# Patient Record
Sex: MALE | Race: OTHER | HISPANIC OR LATINO | Employment: FULL TIME | URBAN - METROPOLITAN AREA
[De-identification: names, ages, dates, MRNs, and addresses within clinical notes are randomized per-mention and may not be internally consistent; named-entity substitution may affect disease eponyms.]

---

## 2021-04-08 ENCOUNTER — HOSPITAL ENCOUNTER (INPATIENT)
Facility: HOSPITAL | Age: 36
LOS: 4 days | Discharge: HOME | DRG: 177 | End: 2021-04-12
Attending: EMERGENCY MEDICINE | Admitting: FAMILY MEDICINE
Payer: COMMERCIAL

## 2021-04-08 ENCOUNTER — APPOINTMENT (EMERGENCY)
Dept: RADIOLOGY | Facility: HOSPITAL | Age: 36
DRG: 177 | End: 2021-04-08
Payer: COMMERCIAL

## 2021-04-08 DIAGNOSIS — E87.1 HYPONATREMIA: ICD-10-CM

## 2021-04-08 DIAGNOSIS — E87.6 HYPOKALEMIA: ICD-10-CM

## 2021-04-08 DIAGNOSIS — J12.82 PNEUMONIA DUE TO COVID-19 VIRUS: Primary | ICD-10-CM

## 2021-04-08 DIAGNOSIS — R73.9 HYPERGLYCEMIA: ICD-10-CM

## 2021-04-08 DIAGNOSIS — U07.1 PNEUMONIA DUE TO COVID-19 VIRUS: Primary | ICD-10-CM

## 2021-04-08 DIAGNOSIS — R05.9 COUGH: ICD-10-CM

## 2021-04-08 DIAGNOSIS — R09.02 HYPOXEMIA: ICD-10-CM

## 2021-04-08 PROBLEM — R06.02 SOB (SHORTNESS OF BREATH): Status: ACTIVE | Noted: 2021-04-08

## 2021-04-08 PROBLEM — E87.8 ELECTROLYTE ABNORMALITY: Status: ACTIVE | Noted: 2021-04-08

## 2021-04-08 LAB
ALBUMIN SERPL BCP-MCNC: 2.9 G/DL (ref 3.5–5)
ALP SERPL-CCNC: 43 U/L (ref 46–116)
ALT SERPL W P-5'-P-CCNC: 43 U/L (ref 12–78)
ANION GAP SERPL CALCULATED.3IONS-SCNC: 11 MMOL/L (ref 4–13)
AST SERPL W P-5'-P-CCNC: 42 U/L (ref 5–45)
ATRIAL RATE: 123 BPM
BASOPHILS # BLD AUTO: 0.01 THOUSANDS/ΜL (ref 0–0.1)
BASOPHILS NFR BLD AUTO: 0 % (ref 0–1)
BILIRUB SERPL-MCNC: 0.45 MG/DL (ref 0.2–1)
BUN SERPL-MCNC: 8 MG/DL (ref 5–25)
CALCIUM ALBUM COR SERPL-MCNC: 9.1 MG/DL (ref 8.3–10.1)
CALCIUM SERPL-MCNC: 8.2 MG/DL (ref 8.3–10.1)
CHLORIDE SERPL-SCNC: 97 MMOL/L (ref 100–108)
CK SERPL-CCNC: 118 U/L (ref 39–308)
CO2 SERPL-SCNC: 21 MMOL/L (ref 21–32)
CREAT SERPL-MCNC: 0.99 MG/DL (ref 0.6–1.3)
CRP SERPL QL: 111.4 MG/L
D DIMER PPP FEU-MCNC: 0.84 UG/ML FEU
EOSINOPHIL # BLD AUTO: 0 THOUSAND/ΜL (ref 0–0.61)
EOSINOPHIL NFR BLD AUTO: 0 % (ref 0–6)
ERYTHROCYTE [DISTWIDTH] IN BLOOD BY AUTOMATED COUNT: 11.8 % (ref 11.6–15.1)
FLUAV RNA RESP QL NAA+PROBE: NEGATIVE
FLUBV RNA RESP QL NAA+PROBE: NEGATIVE
GFR SERPL CREATININE-BSD FRML MDRD: 98 ML/MIN/1.73SQ M
GLUCOSE SERPL-MCNC: 294 MG/DL (ref 65–140)
HCT VFR BLD AUTO: 41.8 % (ref 36.5–49.3)
HGB BLD-MCNC: 14.5 G/DL (ref 12–17)
HIV 1+2 AB+HIV1 P24 AG SERPL QL IA: NORMAL
HIV1 P24 AG SER QL: NORMAL
IMM GRANULOCYTES # BLD AUTO: 0.08 THOUSAND/UL (ref 0–0.2)
IMM GRANULOCYTES NFR BLD AUTO: 1 % (ref 0–2)
LACTATE SERPL-SCNC: 1.8 MMOL/L (ref 0.5–2)
LYMPHOCYTES # BLD AUTO: 1.3 THOUSANDS/ΜL (ref 0.6–4.47)
LYMPHOCYTES NFR BLD AUTO: 19 % (ref 14–44)
MCH RBC QN AUTO: 29.6 PG (ref 26.8–34.3)
MCHC RBC AUTO-ENTMCNC: 34.7 G/DL (ref 31.4–37.4)
MCV RBC AUTO: 85 FL (ref 82–98)
MONOCYTES # BLD AUTO: 0.54 THOUSAND/ΜL (ref 0.17–1.22)
MONOCYTES NFR BLD AUTO: 8 % (ref 4–12)
NEUTROPHILS # BLD AUTO: 4.78 THOUSANDS/ΜL (ref 1.85–7.62)
NEUTS SEG NFR BLD AUTO: 72 % (ref 43–75)
NRBC BLD AUTO-RTO: 0 /100 WBCS
PLATELET # BLD AUTO: 163 THOUSANDS/UL (ref 149–390)
PLATELET # BLD AUTO: 163 THOUSANDS/UL (ref 149–390)
PMV BLD AUTO: 10.1 FL (ref 8.9–12.7)
PMV BLD AUTO: 9.9 FL (ref 8.9–12.7)
POTASSIUM SERPL-SCNC: 3.2 MMOL/L (ref 3.5–5.3)
PR INTERVAL: 120 MS
PROT SERPL-MCNC: 7.3 G/DL (ref 6.4–8.2)
QRS AXIS: 64 DEGREES
QRSD INTERVAL: 80 MS
QT INTERVAL: 306 MS
QTC INTERVAL: 438 MS
RBC # BLD AUTO: 4.9 MILLION/UL (ref 3.88–5.62)
RSV RNA RESP QL NAA+PROBE: NEGATIVE
SARS-COV-2 RNA RESP QL NAA+PROBE: POSITIVE
SODIUM SERPL-SCNC: 129 MMOL/L (ref 136–145)
T WAVE AXIS: -18 DEGREES
TROPONIN I SERPL-MCNC: <0.02 NG/ML
VENTRICULAR RATE: 123 BPM
WBC # BLD AUTO: 6.71 THOUSAND/UL (ref 4.31–10.16)

## 2021-04-08 PROCEDURE — 85025 COMPLETE CBC W/AUTO DIFF WBC: CPT | Performed by: EMERGENCY MEDICINE

## 2021-04-08 PROCEDURE — 83605 ASSAY OF LACTIC ACID: CPT | Performed by: EMERGENCY MEDICINE

## 2021-04-08 PROCEDURE — 82728 ASSAY OF FERRITIN: CPT | Performed by: NURSE PRACTITIONER

## 2021-04-08 PROCEDURE — 93005 ELECTROCARDIOGRAM TRACING: CPT

## 2021-04-08 PROCEDURE — 99284 EMERGENCY DEPT VISIT MOD MDM: CPT | Performed by: EMERGENCY MEDICINE

## 2021-04-08 PROCEDURE — 96365 THER/PROPH/DIAG IV INF INIT: CPT

## 2021-04-08 PROCEDURE — 86803 HEPATITIS C AB TEST: CPT | Performed by: NURSE PRACTITIONER

## 2021-04-08 PROCEDURE — 82550 ASSAY OF CK (CPK): CPT | Performed by: NURSE PRACTITIONER

## 2021-04-08 PROCEDURE — 71045 X-RAY EXAM CHEST 1 VIEW: CPT

## 2021-04-08 PROCEDURE — 84145 PROCALCITONIN (PCT): CPT | Performed by: EMERGENCY MEDICINE

## 2021-04-08 PROCEDURE — 84484 ASSAY OF TROPONIN QUANT: CPT | Performed by: NURSE PRACTITIONER

## 2021-04-08 PROCEDURE — 87340 HEPATITIS B SURFACE AG IA: CPT | Performed by: NURSE PRACTITIONER

## 2021-04-08 PROCEDURE — 99223 1ST HOSP IP/OBS HIGH 75: CPT | Performed by: FAMILY MEDICINE

## 2021-04-08 PROCEDURE — XW033E5 INTRODUCTION OF REMDESIVIR ANTI-INFECTIVE INTO PERIPHERAL VEIN, PERCUTANEOUS APPROACH, NEW TECHNOLOGY GROUP 5: ICD-10-PCS | Performed by: FAMILY MEDICINE

## 2021-04-08 PROCEDURE — 93010 ELECTROCARDIOGRAM REPORT: CPT | Performed by: INTERNAL MEDICINE

## 2021-04-08 PROCEDURE — 87806 HIV AG W/HIV1&2 ANTB W/OPTIC: CPT | Performed by: NURSE PRACTITIONER

## 2021-04-08 PROCEDURE — 86140 C-REACTIVE PROTEIN: CPT | Performed by: NURSE PRACTITIONER

## 2021-04-08 PROCEDURE — 80053 COMPREHEN METABOLIC PANEL: CPT | Performed by: EMERGENCY MEDICINE

## 2021-04-08 PROCEDURE — 85379 FIBRIN DEGRADATION QUANT: CPT | Performed by: NURSE PRACTITIONER

## 2021-04-08 PROCEDURE — 99285 EMERGENCY DEPT VISIT HI MDM: CPT

## 2021-04-08 PROCEDURE — 87040 BLOOD CULTURE FOR BACTERIA: CPT | Performed by: EMERGENCY MEDICINE

## 2021-04-08 PROCEDURE — 85049 AUTOMATED PLATELET COUNT: CPT | Performed by: NURSE PRACTITIONER

## 2021-04-08 PROCEDURE — 86705 HEP B CORE ANTIBODY IGM: CPT | Performed by: NURSE PRACTITIONER

## 2021-04-08 PROCEDURE — 86704 HEP B CORE ANTIBODY TOTAL: CPT | Performed by: NURSE PRACTITIONER

## 2021-04-08 PROCEDURE — 36415 COLL VENOUS BLD VENIPUNCTURE: CPT | Performed by: EMERGENCY MEDICINE

## 2021-04-08 PROCEDURE — 0241U HB NFCT DS VIR RESP RNA 4 TRGT: CPT | Performed by: EMERGENCY MEDICINE

## 2021-04-08 RX ORDER — MAGNESIUM HYDROXIDE/ALUMINUM HYDROXICE/SIMETHICONE 120; 1200; 1200 MG/30ML; MG/30ML; MG/30ML
30 SUSPENSION ORAL EVERY 6 HOURS PRN
Status: DISCONTINUED | OUTPATIENT
Start: 2021-04-08 | End: 2021-04-12 | Stop reason: HOSPADM

## 2021-04-08 RX ORDER — FAMOTIDINE 20 MG/1
20 TABLET, FILM COATED ORAL 2 TIMES DAILY
Status: DISCONTINUED | OUTPATIENT
Start: 2021-04-09 | End: 2021-04-12 | Stop reason: HOSPADM

## 2021-04-08 RX ORDER — ACETAMINOPHEN 325 MG/1
650 TABLET ORAL EVERY 6 HOURS PRN
Status: DISCONTINUED | OUTPATIENT
Start: 2021-04-08 | End: 2021-04-12 | Stop reason: HOSPADM

## 2021-04-08 RX ORDER — DEXAMETHASONE SODIUM PHOSPHATE 4 MG/ML
6 INJECTION, SOLUTION INTRA-ARTICULAR; INTRALESIONAL; INTRAMUSCULAR; INTRAVENOUS; SOFT TISSUE EVERY 24 HOURS
Status: DISCONTINUED | OUTPATIENT
Start: 2021-04-08 | End: 2021-04-12 | Stop reason: HOSPADM

## 2021-04-08 RX ORDER — ASCORBIC ACID 500 MG
1000 TABLET ORAL EVERY 12 HOURS SCHEDULED
Status: DISCONTINUED | OUTPATIENT
Start: 2021-04-08 | End: 2021-04-12 | Stop reason: HOSPADM

## 2021-04-08 RX ORDER — MULTIVITAMIN/IRON/FOLIC ACID 18MG-0.4MG
1 TABLET ORAL DAILY
Status: DISCONTINUED | OUTPATIENT
Start: 2021-04-16 | End: 2021-04-12 | Stop reason: HOSPADM

## 2021-04-08 RX ORDER — POTASSIUM CHLORIDE 14.9 MG/ML
20 INJECTION INTRAVENOUS ONCE
Status: COMPLETED | OUTPATIENT
Start: 2021-04-08 | End: 2021-04-08

## 2021-04-08 RX ORDER — DOXYCYCLINE HYCLATE 100 MG/1
100 CAPSULE ORAL ONCE
Status: COMPLETED | OUTPATIENT
Start: 2021-04-08 | End: 2021-04-08

## 2021-04-08 RX ORDER — ZINC SULFATE 50(220)MG
220 CAPSULE ORAL DAILY
Status: DISCONTINUED | OUTPATIENT
Start: 2021-04-09 | End: 2021-04-12 | Stop reason: HOSPADM

## 2021-04-08 RX ORDER — MELATONIN
2000 DAILY
Status: DISCONTINUED | OUTPATIENT
Start: 2021-04-09 | End: 2021-04-12 | Stop reason: HOSPADM

## 2021-04-08 RX ORDER — DOCUSATE SODIUM 100 MG/1
100 CAPSULE, LIQUID FILLED ORAL 2 TIMES DAILY
Status: DISCONTINUED | OUTPATIENT
Start: 2021-04-08 | End: 2021-04-12 | Stop reason: HOSPADM

## 2021-04-08 RX ORDER — ONDANSETRON 2 MG/ML
4 INJECTION INTRAMUSCULAR; INTRAVENOUS EVERY 6 HOURS PRN
Status: DISCONTINUED | OUTPATIENT
Start: 2021-04-08 | End: 2021-04-12 | Stop reason: HOSPADM

## 2021-04-08 RX ADMIN — OXYCODONE HYDROCHLORIDE AND ACETAMINOPHEN 1000 MG: 500 TABLET ORAL at 21:42

## 2021-04-08 RX ADMIN — REMDESIVIR 200 MG: 100 INJECTION, POWDER, LYOPHILIZED, FOR SOLUTION INTRAVENOUS at 21:43

## 2021-04-08 RX ADMIN — SODIUM CHLORIDE 1000 ML: 0.9 INJECTION, SOLUTION INTRAVENOUS at 17:41

## 2021-04-08 RX ADMIN — DOXYCYCLINE 100 MG: 100 CAPSULE ORAL at 17:41

## 2021-04-08 RX ADMIN — POTASSIUM CHLORIDE 20 MEQ: 14.9 INJECTION, SOLUTION INTRAVENOUS at 19:12

## 2021-04-08 RX ADMIN — CEFTRIAXONE SODIUM 1000 MG: 10 INJECTION, POWDER, FOR SOLUTION INTRAVENOUS at 17:41

## 2021-04-08 RX ADMIN — DEXAMETHASONE SODIUM PHOSPHATE 6 MG: 4 INJECTION, SOLUTION INTRA-ARTICULAR; INTRALESIONAL; INTRAMUSCULAR; INTRAVENOUS; SOFT TISSUE at 21:42

## 2021-04-08 NOTE — H&P
[address] 1985, 29 y o  male MRN: [id number]  Unit/Bed#: ED 27 Encounter: [id number]  Primary Care Provider: No primary care provider on file  Date and time admitted to hospital: 4/8/2021  4:36 PM    * Pneumonia due to COVID-19 virus  Assessment & Plan   Mild COVID pathway as below    COVID19- positive on 4/8    Supplemental oxygen with 3 L saturating 94%  o Wean as tolerated for oxygen saturation greater than 92%   CXR revealed findings consistent with COVID pneumonia   Blood type and screen pending   CBC and CMP daily   Cardiac markers: troponin, CK, BNP pending   Inflammatory markers: CRP, D-Dimer, and ferritin pending   Vitamin D3 2000 IU daily, vitamin C 1g PO BID, zinc 220mg PO daily    AC with enoxaparin given D-dimer   Dexamethasone 6mg IV daily X 10 days   Remdesivir 200mg IV day 1 and 100mg IV daily X4 days    Consideration for convalescent plasma therapy    OOB TID; ambulation and mobilization strongly encouraged   PT consult and evaluation    Self proning strongly encouraged   Supportive care    SOB (shortness of breath)  Assessment & Plan  Likely in the setting of COVID-19 pneumonia  Plan for principal problem   symptom management    Electrolyte abnormality  Assessment & Plan  Hyponatremia 129- asymptomatic  Hypokalemia 3 2- IV supplementation ordered  Likely in the setting of decreased p o  Intake  Patient received 1 L bolus of normal saline in the ED will hold off on any additional fluids given COVID  BMP in a m  VTE Prophylaxis: Enoxaparin (Lovenox)  / sequential compression device   Code Status:  Full code  POLST: POLST form is not discussed and not completed at this time  Discussion with family:  Not available    Anticipated Length of Stay:  Patient will be admitted on an Inpatient basis with an anticipated length of stay of  > 2 midnights     Justification for Hospital Stay:  Treatment of [place], IV remdesivir, IV steroids    Total Time for Visit, including Counseling / Coordination of Care: 1 hour  Greater than 50% of this total time spent on direct patient counseling and coordination of care  Chief Complaint:      Chief Complaint   Patient presents with    Shortness of Breath     Pt arrives via EMS due to new onset of SOB, cough and fever of 103 at home  Pt's wife tested positive for covid 4 days ago          History of Present Illness:    Karla Will is a 29 y o  male who presents with shortness of breath  Patient states that he was recently diagnosed with COVID with positive test 4 days ago  He states he has had symptoms for approximately 7 days total, notes significant worsening over the last 1-2 days  He complains of fever with T-max of 103° at home as well as worsening shortness of breath and cough  Patient denies any other pain or symptoms at this time  He denies smoking or any prior medical problems  Review of Systems:    Review of Systems   Constitutional: Positive for activity change, fatigue and fever  HENT: Negative  Eyes: Negative  Respiratory: Positive for cough, chest tightness and shortness of breath  Cardiovascular: Negative  Gastrointestinal: Negative  Endocrine: Negative  Genitourinary: Negative  Musculoskeletal: Negative  Skin: Negative  Allergic/Immunologic: Negative  Neurological: Negative  Hematological: Negative  Psychiatric/Behavioral: Negative  Past Medical and Surgical History:     History reviewed  No pertinent past medical history  Past Surgical History:   Procedure Laterality Date    BACK SURGERY       Meds/Allergies:    Prior to Admission medications    Not on File     I have reviewed home medications with patient personally      Allergies: No Known Allergies    Social History:     Marital Status: Single   Occupation:  [employer]  Patient Pre-hospital Living Situation:  Private residence  Patient Pre-hospital Level of Mobility: Independent  Patient Pre-hospital Diet Restrictions:  None  Substance Use History:   Social History     Substance and Sexual Activity   Alcohol Use Not Currently     Social History     Tobacco Use   Smoking Status Never Smoker   Smokeless Tobacco Never Used     Social History     Substance and Sexual Activity   Drug Use Never     Family History:    History reviewed  No pertinent family history  Physical Exam:     Vitals:   Blood Pressure: 112/65 (04/08/21 1900)  Pulse: 93 (04/08/21 1900)  Temperature: 98 5 °F (36 9 °C) (04/08/21 1722)  Temp Source: Oral (04/08/21 1722)  Respirations: 20 (04/08/21 1900)  SpO2: 94 % (04/08/21 1900)    Physical Exam  Vitals signs and nursing note reviewed  Constitutional:       General: He is in acute distress  Appearance: Normal appearance  He is ill-appearing  HENT:      Head: Normocephalic  Neck:      Musculoskeletal: Normal range of motion  Cardiovascular:      Rate and Rhythm: Normal rate  Pulses: Normal pulses  Pulmonary:      Effort: Respiratory distress present  Breath sounds: Rhonchi present  Abdominal:      Palpations: Abdomen is soft  Musculoskeletal: Normal range of motion  Skin:     General: Skin is warm  Neurological:      General: No focal deficit present  Mental Status: He is alert  Mental status is at baseline  Psychiatric:         Mood and Affect: Mood normal          Behavior: Behavior normal      Additional Data:     Lab Results: I have personally reviewed pertinent reports        Results from last 7 days   Lab Units 04/08/21  1729   WBC Thousand/uL 6 71   HEMOGLOBIN g/dL 14 5   HEMATOCRIT % 41 8   PLATELETS Thousands/uL 163   NEUTROS PCT % 72   LYMPHS PCT % 19   MONOS PCT % 8   EOS PCT % 0     Results from last 7 days   Lab Units 04/08/21  1729   SODIUM mmol/L 129*   POTASSIUM mmol/L 3 2*   CHLORIDE mmol/L 97*   CO2 mmol/L 21   BUN mg/dL 8   CREATININE mg/dL 0 99   ANION GAP mmol/L 11   CALCIUM mg/dL 8 2*   ALBUMIN g/dL 2 9*   TOTAL BILIRUBIN mg/dL 0 45   ALK PHOS U/L 43*   ALT U/L 43   AST U/L 42   GLUCOSE RANDOM mg/dL 294*                 Results from last 7 days   Lab Units 04/08/21  1729   LACTIC ACID mmol/L 1 8       Imaging: I have personally reviewed pertinent reports  XR chest 1 view portable    (Results Pending)       EKG, Pathology, and Other Studies Reviewed on Admission:   · EKG:  Not available    Allscripts / Epic Records Reviewed: Yes     ** Please Note: This note has been constructed using a voice recognition system   **

## 2021-04-08 NOTE — ED PROVIDER NOTES
Pt Name: Mindi Hurley  MRN: [id number]  [place] 1985  Age/Sex: 29 y o  male  Date of evaluation: 4/8/2021  PCP: No primary care provider on file  CHIEF COMPLAINT    Chief Complaint   Patient presents with    Shortness of Breath     Pt arrives via EMS due to new onset of SOB, cough and fever of 103 at home  Pt's wife tested positive for covid 4 days ago          HPI    29 y o  male presenting with shortness of breath  Patient states that he was recently diagnosed with COVID with positive test 4 days ago  He states he has had symptoms for approximately 7 days total, notes significant worsening over the last 1-2 days  He complains of fever with T-max of 103° at home as well as worsening shortness of breath and cough  Patient denies any other pain or symptoms at this time  He denies smoking or any prior medical problems  HPI      Past Medical and Surgical History    History reviewed  No pertinent past medical history  Past Surgical History:   Procedure Laterality Date    BACK SURGERY         History reviewed  No pertinent family history  Social History     Tobacco Use    Smoking status: Never Smoker    Smokeless tobacco: Never Used   Substance Use Topics    Alcohol use: Not Currently    Drug use: Never           Allergies    No Known Allergies    Home Medications    Prior to Admission medications    Not on File           Review of Systems    Review of Systems   Constitutional: Positive for fever  Negative for appetite change, chills and diaphoresis  HENT: Negative for drooling, facial swelling, trouble swallowing and voice change  Respiratory: Positive for cough and shortness of breath  Negative for apnea and wheezing  Cardiovascular: Negative for chest pain and leg swelling  Gastrointestinal: Positive for nausea and vomiting  Negative for abdominal distention, abdominal pain and diarrhea  Genitourinary: Negative for dysuria and urgency     Musculoskeletal: Negative for arthralgias, back pain, gait problem and neck pain  Skin: Negative for color change, rash and wound  Neurological: Negative for seizures, speech difficulty, weakness and headaches  Psychiatric/Behavioral: Negative for agitation, behavioral problems and dysphoric mood  The patient is not nervous/anxious  All other systems reviewed and negative  Physical Exam      ED Triage Vitals   Temperature Pulse Respirations Blood Pressure SpO2   04/08/21 1722 04/08/21 1640 04/08/21 1640 04/08/21 1640 04/08/21 1640   98 5 °F (36 9 °C) (!) 122 22 114/66 (!) 88 %      Temp Source Heart Rate Source Patient Position - Orthostatic VS BP Location FiO2 (%)   04/08/21 1722 04/08/21 1640 -- 04/08/21 1640 --   Oral Monitor  Left arm       Pain Score       --                      Physical Exam  Vitals signs and nursing note reviewed  Constitutional:       Appearance: He is well-developed  HENT:      Head: Normocephalic and atraumatic  Eyes:      Conjunctiva/sclera: Conjunctivae normal       Pupils: Pupils are equal, round, and reactive to light  Neck:      Musculoskeletal: Normal range of motion and neck supple  Trachea: No tracheal deviation  Cardiovascular:      Rate and Rhythm: Regular rhythm  Tachycardia present  Heart sounds: Normal heart sounds  No murmur  Pulmonary:      Effort: Respiratory distress present  Breath sounds: No stridor  No wheezing or rales  Comments: Tachypneic, in mild respiratory distress, no focal breath abnormalities noted  Abdominal:      General: There is no distension  Palpations: Abdomen is soft  Tenderness: There is no abdominal tenderness  There is no guarding or rebound  Musculoskeletal: Normal range of motion  General: No deformity  Skin:     General: Skin is warm and dry  Findings: No rash  Neurological:      Mental Status: He is alert and oriented to person, place, and time     Psychiatric:         Behavior: Behavior normal  Thought Content: Thought content normal          Judgment: Judgment normal               Diagnostic Results  EKG Interpretation    Rate:  123  BPM  Rhythm:  Normal Sinus Rhythm   Axis:  Normal   Intervals: Normal, no blocks, QTc  438 ms  Q waves:  No pathologic Q waves   T waves:  Normal   ST segments:  No significant elevations or depressions     Impression:  Sinus tachycardia without evidence of acute ischemia or significant arrhythmia      EKG for comparison:  None available    EKG interpreted by me  Labs:    Results Reviewed     Procedure Component Value Units Date/Time    COVID19, Influenza A/B, RSV PCR, [facility] [[id number]]  (Abnormal) Collected: 04/08/21 1729    Lab Status: Final result Specimen: Nares from Nose Updated: 04/08/21 1824     SARS-CoV-2 Positive     INFLUENZA A PCR Negative     INFLUENZA B PCR Negative     RSV PCR Negative    Narrative: This test has been authorized by FDA under an EUA (Emergency Use Assay) for use by authorized laboratories  Clinical caution and judgement should be used with the interpretation of these results with consideration of the clinical impression and other laboratory testing  Testing reported as "Positive" or "Negative" has been proven to be accurate according to standard laboratory validation requirements  All testing is performed with control materials showing appropriate reactivity at standard intervals      Comprehensive metabolic panel [[id number]]  (Abnormal) Collected: 04/08/21 1729    Lab Status: Final result Specimen: Blood from Arm, Right Updated: 04/08/21 1814     Sodium 129 mmol/L      Potassium 3 2 mmol/L      Chloride 97 mmol/L      CO2 21 mmol/L      ANION GAP 11 mmol/L      BUN 8 mg/dL      Creatinine 0 99 mg/dL      Glucose 294 mg/dL      Calcium 8 2 mg/dL      Corrected Calcium 9 1 mg/dL      AST 42 U/L      ALT 43 U/L      Alkaline Phosphatase 43 U/L      Total Protein 7 3 g/dL      Albumin 2 9 g/dL      Total Bilirubin 0 45 mg/dL eGFR 98 ml/min/1 73sq m     Narrative:      [place] guidelines for Chronic Kidney Disease (CKD):     Stage 1 with normal or high GFR (GFR > 90 mL/min/1 73 square meters)    Stage 2 Mild CKD (GFR = 60-89 mL/min/1 73 square meters)    Stage 3A Moderate CKD (GFR = 45-59 mL/min/1 73 square meters)    Stage 3B Moderate CKD (GFR = 30-44 mL/min/1 73 square meters)    Stage 4 Severe CKD (GFR = 15-29 mL/min/1 73 square meters)    Stage 5 End Stage CKD (GFR <15 mL/min/1 73 square meters)  Note: GFR calculation is accurate only with a steady state creatinine    Lactic acid [[id number]]  (Normal) Collected: 04/08/21 1729    Lab Status: Final result Specimen: Blood from Arm, Right Updated: 04/08/21 1800     LACTIC ACID 1 8 mmol/L     Narrative:      Result may be elevated if tourniquet was used during collection  CBC and differential [[id number]] Collected: 04/08/21 1729    Lab Status: Final result Specimen: Blood from Arm, Right Updated: 04/08/21 1740     WBC 6 71 Thousand/uL      RBC 4 90 Million/uL      Hemoglobin 14 5 g/dL      Hematocrit 41 8 %      MCV 85 fL      MCH 29 6 pg      MCHC 34 7 g/dL      RDW 11 8 %      MPV 9 9 fL      Platelets 825 Thousands/uL      nRBC 0 /100 WBCs      Neutrophils Relative 72 %      Immat GRANS % 1 %      Lymphocytes Relative 19 %      Monocytes Relative 8 %      Eosinophils Relative 0 %      Basophils Relative 0 %      Neutrophils Absolute 4 78 Thousands/µL      Immature Grans Absolute 0 08 Thousand/uL      Lymphocytes Absolute 1 30 Thousands/µL      Monocytes Absolute 0 54 Thousand/µL      Eosinophils Absolute 0 00 Thousand/µL      Basophils Absolute 0 01 Thousands/µL     Procalcitonin with AM Reflex [[id number]] Collected: 04/08/21 1729    Lab Status: In process Specimen: Blood from Arm, Right Updated: 04/08/21 1736    Blood culture #1 [[id number]] Collected: 04/08/21 1729    Lab Status:  In process Specimen: Blood from Arm, Left Updated: 04/08/21 1736 Blood culture #2 [[id number]] Collected: 04/08/21 1729    Lab Status: In process Specimen: Blood from Arm, Right Updated: 04/08/21 1736          All labs reviewed and utilized in the medical decision making process    Radiology:    XR chest 1 view portable    (Results Pending)       All radiology studies independently viewed by me and interpreted by the radiologist     Procedure    Procedures        ED Course of Care and Re-Assessments      Started on ceftriaxone and doxycycline in ER, also given IV fluids and potassium repletion begun  Medications   potassium chloride 20 mEq IVPB (premix) (20 mEq Intravenous New Bag 4/8/21 1912)   ceftriaxone (ROCEPHIN) 1 g/50 mL in dextrose IVPB (0 mg Intravenous Stopped 4/8/21 1907)   doxycycline hyclate (VIBRAMYCIN) capsule 100 mg (100 mg Oral Given 4/8/21 1741)   sodium chloride 0 9 % bolus 1,000 mL (0 mL Intravenous Stopped 4/8/21 1910)           FINAL IMPRESSION    Final diagnoses:   Pneumonia due to COVID-19 virus   Hypoxemia   Hyponatremia   Hypokalemia   Hyperglycemia         DISPOSITION/PLAN    Presentation as above most consistent with pneumonia due to COVID-19 virus with hypoxemia  Lab workup remarkable for hyponatremia hypokalemia and hyperglycemia, unclear if this represents acute phase reactant versus previously undiagnosed diabetes  After initial diagnostics treatment and resuscitation in emergency department, admitted to Internal Medicine for further care, hemodynamically stable and comfortable at that time    Time reflects when diagnosis was documented in both MDM as applicable and the Disposition within this note     Time User Action Codes Description Comment    4/8/2021  6:53 PM Maryelizabeth Conine Add [U07 1,  J12 82] Pneumonia due to COVID-19 virus     4/8/2021  6:53 PM Maryelizabeth Conine Add [R09 02] Hypoxemia     4/8/2021  6:54 PM Maryelizabeth Conine Add [E87 1] Hyponatremia     4/8/2021  6:54 PM Maryelizabeth Conine Add [E87 6] Hypokalemia 4/8/2021  6:54 PM Bud RUSSELL Add [R73 9] Hyperglycemia       ED Disposition     ED Disposition Condition Date/Time Comment    Admit Stable Thu Apr 8, 2021  6:53 PM Case was discussed with KAVEH and the patient's admission status was agreed to be Admission Status: inpatient status to the service of Dr Billy Spears   Follow-up Information    None           PATIENT REFERRED TO:    No follow-up provider specified  DISCHARGE MEDICATIONS:    Patient's Medications    No medications on file       No discharge procedures on file           MD New Gomez MD  04/08/21 [id number]

## 2021-04-08 NOTE — ED NOTES
Pt ambulatory to the restroom in the room      David Co, [address] [place] [address]  04/08/21 1937

## 2021-04-09 PROBLEM — J96.01 ACUTE RESPIRATORY FAILURE WITH HYPOXIA (HCC): Status: ACTIVE | Noted: 2021-04-09

## 2021-04-09 LAB
ALBUMIN SERPL BCP-MCNC: 3 G/DL (ref 3.5–5)
ALP SERPL-CCNC: 50 U/L (ref 46–116)
ALT SERPL W P-5'-P-CCNC: 52 U/L (ref 12–78)
ANION GAP SERPL CALCULATED.3IONS-SCNC: 13 MMOL/L (ref 4–13)
AST SERPL W P-5'-P-CCNC: 46 U/L (ref 5–45)
BASOPHILS # BLD AUTO: 0.03 THOUSANDS/ΜL (ref 0–0.1)
BASOPHILS NFR BLD AUTO: 1 % (ref 0–1)
BILIRUB SERPL-MCNC: 0.42 MG/DL (ref 0.2–1)
BUN SERPL-MCNC: 11 MG/DL (ref 5–25)
CALCIUM ALBUM COR SERPL-MCNC: 9.5 MG/DL (ref 8.3–10.1)
CALCIUM SERPL-MCNC: 8.7 MG/DL (ref 8.3–10.1)
CHLORIDE SERPL-SCNC: 101 MMOL/L (ref 100–108)
CO2 SERPL-SCNC: 22 MMOL/L (ref 21–32)
CREAT SERPL-MCNC: 0.93 MG/DL (ref 0.6–1.3)
CRP SERPL QL: 137.7 MG/L
D DIMER PPP FEU-MCNC: 1.21 UG/ML FEU
EOSINOPHIL # BLD AUTO: 0 THOUSAND/ΜL (ref 0–0.61)
EOSINOPHIL NFR BLD AUTO: 0 % (ref 0–6)
ERYTHROCYTE [DISTWIDTH] IN BLOOD BY AUTOMATED COUNT: 11.8 % (ref 11.6–15.1)
FERRITIN SERPL-MCNC: 1425 NG/ML (ref 8–388)
FERRITIN SERPL-MCNC: 1600 NG/ML (ref 8–388)
GFR SERPL CREATININE-BSD FRML MDRD: 106 ML/MIN/1.73SQ M
GLUCOSE SERPL-MCNC: 325 MG/DL (ref 65–140)
HBV CORE AB SER QL: NORMAL
HBV CORE IGM SER QL: NORMAL
HBV SURFACE AG SER QL: NORMAL
HCT VFR BLD AUTO: 47.1 % (ref 36.5–49.3)
HCV AB SER QL: NORMAL
HGB BLD-MCNC: 15.7 G/DL (ref 12–17)
IMM GRANULOCYTES # BLD AUTO: 0.12 THOUSAND/UL (ref 0–0.2)
IMM GRANULOCYTES NFR BLD AUTO: 2 % (ref 0–2)
LYMPHOCYTES # BLD AUTO: 0.92 THOUSANDS/ΜL (ref 0.6–4.47)
LYMPHOCYTES NFR BLD AUTO: 16 % (ref 14–44)
MCH RBC QN AUTO: 29.4 PG (ref 26.8–34.3)
MCHC RBC AUTO-ENTMCNC: 33.3 G/DL (ref 31.4–37.4)
MCV RBC AUTO: 88 FL (ref 82–98)
MONOCYTES # BLD AUTO: 0.36 THOUSAND/ΜL (ref 0.17–1.22)
MONOCYTES NFR BLD AUTO: 6 % (ref 4–12)
NEUTROPHILS # BLD AUTO: 4.48 THOUSANDS/ΜL (ref 1.85–7.62)
NEUTS SEG NFR BLD AUTO: 75 % (ref 43–75)
NRBC BLD AUTO-RTO: 0 /100 WBCS
PLATELET # BLD AUTO: 193 THOUSANDS/UL (ref 149–390)
PMV BLD AUTO: 9.9 FL (ref 8.9–12.7)
POTASSIUM SERPL-SCNC: 4 MMOL/L (ref 3.5–5.3)
PROCALCITONIN SERPL-MCNC: 0.22 NG/ML
PROT SERPL-MCNC: 8.2 G/DL (ref 6.4–8.2)
RBC # BLD AUTO: 5.34 MILLION/UL (ref 3.88–5.62)
SODIUM SERPL-SCNC: 136 MMOL/L (ref 136–145)
TROPONIN I SERPL-MCNC: <0.02 NG/ML
WBC # BLD AUTO: 5.91 THOUSAND/UL (ref 4.31–10.16)

## 2021-04-09 PROCEDURE — 36415 COLL VENOUS BLD VENIPUNCTURE: CPT | Performed by: NURSE PRACTITIONER

## 2021-04-09 PROCEDURE — 80053 COMPREHEN METABOLIC PANEL: CPT | Performed by: NURSE PRACTITIONER

## 2021-04-09 PROCEDURE — 82728 ASSAY OF FERRITIN: CPT | Performed by: NURSE PRACTITIONER

## 2021-04-09 PROCEDURE — 97163 PT EVAL HIGH COMPLEX 45 MIN: CPT

## 2021-04-09 PROCEDURE — 84484 ASSAY OF TROPONIN QUANT: CPT | Performed by: NURSE PRACTITIONER

## 2021-04-09 PROCEDURE — 99232 SBSQ HOSP IP/OBS MODERATE 35: CPT | Performed by: STUDENT IN AN ORGANIZED HEALTH CARE EDUCATION/TRAINING PROGRAM

## 2021-04-09 PROCEDURE — 85025 COMPLETE CBC W/AUTO DIFF WBC: CPT | Performed by: NURSE PRACTITIONER

## 2021-04-09 PROCEDURE — 84145 PROCALCITONIN (PCT): CPT | Performed by: EMERGENCY MEDICINE

## 2021-04-09 PROCEDURE — 86140 C-REACTIVE PROTEIN: CPT | Performed by: NURSE PRACTITIONER

## 2021-04-09 PROCEDURE — 85379 FIBRIN DEGRADATION QUANT: CPT | Performed by: NURSE PRACTITIONER

## 2021-04-09 RX ORDER — HYDROXYZINE HYDROCHLORIDE 25 MG/1
25 TABLET, FILM COATED ORAL EVERY 6 HOURS PRN
Status: DISCONTINUED | OUTPATIENT
Start: 2021-04-09 | End: 2021-04-12 | Stop reason: HOSPADM

## 2021-04-09 RX ORDER — GUAIFENESIN 600 MG
600 TABLET, EXTENDED RELEASE 12 HR ORAL EVERY 12 HOURS SCHEDULED
Status: DISCONTINUED | OUTPATIENT
Start: 2021-04-09 | End: 2021-04-10

## 2021-04-09 RX ADMIN — GUAIFENESIN 600 MG: 600 TABLET ORAL at 12:12

## 2021-04-09 RX ADMIN — REMDESIVIR 100 MG: 100 INJECTION, POWDER, LYOPHILIZED, FOR SOLUTION INTRAVENOUS at 21:41

## 2021-04-09 RX ADMIN — ZINC SULFATE 220 MG (50 MG) CAPSULE 220 MG: CAPSULE at 08:46

## 2021-04-09 RX ADMIN — ENOXAPARIN SODIUM 40 MG: 40 INJECTION SUBCUTANEOUS at 02:34

## 2021-04-09 RX ADMIN — Medication 2000 UNITS: at 08:46

## 2021-04-09 RX ADMIN — OXYCODONE HYDROCHLORIDE AND ACETAMINOPHEN 1000 MG: 500 TABLET ORAL at 08:45

## 2021-04-09 RX ADMIN — FAMOTIDINE 20 MG: 20 TABLET ORAL at 08:46

## 2021-04-09 RX ADMIN — OXYCODONE HYDROCHLORIDE AND ACETAMINOPHEN 1000 MG: 500 TABLET ORAL at 20:38

## 2021-04-09 RX ADMIN — FAMOTIDINE 20 MG: 20 TABLET ORAL at 02:35

## 2021-04-09 RX ADMIN — ENOXAPARIN SODIUM 40 MG: 40 INJECTION SUBCUTANEOUS at 08:46

## 2021-04-09 RX ADMIN — GUAIFENESIN 600 MG: 600 TABLET ORAL at 20:38

## 2021-04-09 RX ADMIN — FAMOTIDINE 20 MG: 20 TABLET ORAL at 17:31

## 2021-04-09 RX ADMIN — DEXAMETHASONE SODIUM PHOSPHATE 6 MG: 4 INJECTION, SOLUTION INTRA-ARTICULAR; INTRALESIONAL; INTRAMUSCULAR; INTRAVENOUS; SOFT TISSUE at 20:38

## 2021-04-09 RX ADMIN — ENOXAPARIN SODIUM 40 MG: 40 INJECTION SUBCUTANEOUS at 20:38

## 2021-04-09 NOTE — PLAN OF CARE
Problem: PAIN - ADULT  Goal: Verbalizes/displays adequate comfort level or baseline comfort level  Description: Interventions:  - Encourage patient to monitor pain and request assistance  - Assess pain using appropriate pain scale  - Administer analgesics based on type and severity of pain and evaluate response  - Implement non-pharmacological measures as appropriate and evaluate response  - Consider cultural and social influences on pain and pain management  - Notify physician/advanced practitioner if interventions unsuccessful or patient reports new pain  Outcome: Progressing     Problem: INFECTION - ADULT  Goal: Absence or prevention of progression during hospitalization  Description: INTERVENTIONS:  - Assess and monitor for signs and symptoms of infection  - Monitor lab/diagnostic results  - Monitor all insertion sites, i e  indwelling lines, tubes, and drains  - Monitor endotracheal if appropriate and nasal secretions for changes in amount and color  - [place] appropriate cooling/warming therapies per order  - Administer medications as ordered  - Instruct and encourage patient and family to use good hand hygiene technique  - Identify and instruct in appropriate isolation precautions for identified infection/condition  Outcome: Progressing  Goal: Absence of fever/infection during neutropenic period  Description: INTERVENTIONS:  - Monitor WBC    Outcome: Progressing     Problem: SAFETY ADULT  Goal: Patient will remain free of falls  Description: INTERVENTIONS:  - Assess patient frequently for physical needs  -  Identify cognitive and physical deficits and behaviors that affect risk of falls    -  [place] fall precautions as indicated by assessment   - Educate patient/family on patient safety including physical limitations  - Instruct patient to call for assistance with activity based on assessment  - Modify environment to reduce risk of injury  - Consider OT/PT consult to assist with strengthening/mobility  Outcome: Progressing  Goal: Maintain or return to baseline ADL function  Description: INTERVENTIONS:  -  Assess patient's ability to carry out ADLs; assess patient's baseline for ADL function and identify physical deficits which impact ability to perform ADLs (bathing, care of mouth/teeth, toileting, grooming, dressing, etc )  - Assess/evaluate cause of self-care deficits   - Assess range of motion  - Assess patient's mobility; develop plan if impaired  - Assess patient's need for assistive devices and provide as appropriate  - Encourage maximum independence but intervene and supervise when necessary  - Involve family in performance of ADLs  - Assess for home care needs following discharge   - Consider OT consult to assist with ADL evaluation and planning for discharge  - Provide patient education as appropriate  Outcome: Progressing  Goal: Maintain or return mobility status to optimal level  Description: INTERVENTIONS:  - Assess patient's baseline mobility status (ambulation, transfers, stairs, etc )    - Identify cognitive and physical deficits and behaviors that affect mobility  - Identify mobility aids required to assist with transfers and/or ambulation (gait belt, sit-to-stand, lift, walker, cane, etc )  - [place] fall precautions as indicated by assessment  - Record patient progress and toleration of activity level on Mobility SBAR; progress patient to next Phase/Stage  - Instruct patient to call for assistance with activity based on assessment  - Consider rehabilitation consult to assist with strengthening/weightbearing, etc   Outcome: Progressing     Problem: DISCHARGE PLANNING  Goal: Discharge to home or other facility with appropriate resources  Description: INTERVENTIONS:  - Identify barriers to discharge w/patient and caregiver  - Arrange for needed discharge resources and transportation as appropriate  - Identify discharge learning needs (meds, wound care, etc )  - Arrange for interpretive services to assist at discharge as needed  - Refer to Case Management Department for coordinating discharge planning if the patient needs post-hospital services based on physician/advanced practitioner order or complex needs related to functional status, cognitive ability, or social support system  Outcome: Progressing     Problem: Knowledge Deficit  Goal: Patient/family/caregiver demonstrates understanding of disease process, treatment plan, medications, and discharge instructions  Description: Complete learning assessment and assess knowledge base    Interventions:  - Provide teaching at level of understanding  - Provide teaching via preferred learning methods  Outcome: Progressing     Problem: RESPIRATORY - ADULT  Goal: Achieves optimal ventilation and oxygenation  Description: INTERVENTIONS:  - Assess for changes in respiratory status  - Assess for changes in mentation and behavior  - Position to facilitate oxygenation and minimize respiratory effort  - Oxygen administered by appropriate delivery if ordered  - Initiate smoking cessation education as indicated  - Encourage broncho-pulmonary hygiene including cough, deep breathe, Incentive Spirometry  - Assess the need for suctioning and aspirate as needed  - Assess and instruct to report SOB or any respiratory difficulty  - Respiratory Therapy support as indicated  Outcome: Progressing     Problem: GASTROINTESTINAL - ADULT  Goal: Minimal or absence of nausea and/or vomiting  Description: INTERVENTIONS:  - Administer IV fluids if ordered to ensure adequate hydration  - Maintain NPO status until nausea and vomiting are resolved  - Nasogastric tube if ordered  - Administer ordered antiemetic medications as needed  - Provide nonpharmacologic comfort measures as appropriate  - Advance diet as tolerated, if ordered  - Consider nutrition services referral to assist patient with adequate nutrition and appropriate food choices  Outcome: Progressing  Goal: Maintains or returns to baseline bowel function  Description: INTERVENTIONS:  - Assess bowel function  - Encourage oral fluids to ensure adequate hydration  - Administer IV fluids if ordered to ensure adequate hydration  - Administer ordered medications as needed  - Encourage mobilization and activity  - Consider nutritional services referral to assist patient with adequate nutrition and appropriate food choices  Outcome: Progressing  Goal: Maintains adequate nutritional intake  Description: INTERVENTIONS:  - Monitor percentage of each meal consumed  - Identify factors contributing to decreased intake, treat as appropriate  - Assist with meals as needed  - Monitor I&O, weight, and lab values if indicated  - Obtain nutrition services referral as needed  Outcome: Progressing  Goal: Establish and maintain optimal ostomy function  Description: INTERVENTIONS:  - Assess bowel function  - Encourage oral fluids to ensure adequate hydration  - Administer IV fluids if ordered to ensure adequate hydration   - Administer ordered medications as needed  - Encourage mobilization and activity  - Nutrition services referral to assist patient with appropriate food choices  - Assess stoma site  - Consider wound care consult   Outcome: Progressing     Problem: METABOLIC, FLUID AND ELECTROLYTES - ADULT  Goal: Electrolytes maintained within normal limits  Description: INTERVENTIONS:  - Monitor labs and assess patient for signs and symptoms of electrolyte imbalances  - Administer electrolyte replacement as ordered  - Monitor response to electrolyte replacements, including repeat lab results as appropriate  - Instruct patient on fluid and nutrition as appropriate  Outcome: Progressing  Goal: Fluid balance maintained  Description: INTERVENTIONS:  - Monitor labs   - Monitor I/O and WT  - Instruct patient on fluid and nutrition as appropriate  - Assess for signs & symptoms of volume excess or deficit  Outcome: Progressing  Goal: Glucose maintained within target range  Description: INTERVENTIONS:  - Monitor Blood Glucose as ordered  - Assess for signs and symptoms of hyperglycemia and hypoglycemia  - Administer ordered medications to maintain glucose within target range  - Assess nutritional intake and initiate nutrition service referral as needed  Outcome: Progressing

## 2021-04-09 NOTE — UTILIZATION REVIEW
Initial Clinical Review    Admission: Date/Time/Statement:   Admission Orders (From admission, onward)     Ordered        04/08/21 1911  Inpatient Admission  Once                   Orders Placed This Encounter   Procedures    Inpatient Admission     Standing Status:   Standing     Number of Occurrences:   1     Order Specific Question:   Level of Care     Answer:   Med Surg [16]     Order Specific Question:   Estimated length of stay     Answer:   More than 2 Midnights     Order Specific Question:   Certification     Answer:   I certify that inpatient services are medically necessary for this patient for a duration of greater than two midnights  See H&P and MD Progress Notes for additional information about the patient's course of treatment  ED Arrival Information     Expected Arrival Acuity Means of Arrival Escorted By Service Admission Type    - 4/8/2021 16:35 Emergent Ambulance [facility] Medicine Emergency    Arrival Complaint    possible covid sob        Chief Complaint   Patient presents with    Shortness of Breath     Pt arrives via EMS due to new onset of SOB, cough and fever of 103 at home  Pt's wife tested positive for covid 4 days ago      Assessment/Plan: 29year old male to the ED from home via EMS with complaints of shortness of breath, fever  Admitted to inpatient for pneumonia due to COVID 19 virus  He started with symptoms about 7 days prior to arrival with worsening of symptoms over the past 1-2 days  Temp at home 103 with worsening shortness of breath cough  Arrives with tachycardia, tachypnea, hypoxia, confirmed COVID 19 +  Sodium 129, potassium 3 2   Started on IV fluids, potassium  CXR shows: Bilateral ground glass opacities which are consistent with Covid-19 pneumonia  Placed on 3 LNC  D-dimer, ferritin elevated  Continue to trend  Started on IV dexamethasone and IV remdesivir  PT consult  Prone as able  IV abx started     ED Triage Vitals   Temperature Pulse Respirations Blood Pressure SpO2   04/08/21 1722 04/08/21 1640 04/08/21 1640 04/08/21 1640 04/08/21 1640   98 5 °F (36 9 °C) (!) 122 22 114/66 (!) 88 %      Temp Source Heart Rate Source Patient Position - Orthostatic VS BP Location FiO2 (%)   04/08/21 1722 04/08/21 1640 04/09/21 0300 04/08/21 1640 --   Oral Monitor Lying Left arm       Pain Score       04/08/21 2235       4          Wt Readings from Last 1 Encounters:   04/09/21 109 kg (240 lb)     Additional Vital Signs:   Date/Time  Temp Pulse  Resp  BP  MAP (mmHg)  SpO2  Calculated FIO2 (%) - Nasal Cannula  Nasal Cannula O2 Flow Rate (L/min)  O2 Device  Patient Position - Orthostatic VS   04/09/21 0530  -- 87  18  116/76  92  94 %  32  3 L/min  Nasal cannula  Lying   04/09/21 0500  -- 93  16  113/80  93  91 %  32  3 L/min  Nasal cannula  Lying   04/09/21 0300  -- 86  20  97/59  74  93 %  32  3 L/min  Nasal cannula  Lying   04/09/21 0235  -- 89  20  119/69  88  92 %  32  3 L/min  --  --   04/09/21 0106  98 6 °F (37 °C) 100  20  120/80  --  92 %  32  3 L/min  Nasal cannula  --   04/08/21 2030  -- 98  19  113/77  90  94 %  32  3 L/min  Nasal cannula  --   04/08/21 2000  -- 98  18  112/76  90  95 %  32  3 L/min  Nasal cannula  --   04/08/21 1900  -- 93  20  112/65  83  94 %  32  3 L/min  Nasal cannula  --   04/08/21 1800  -- 98  22  112/58  78  91 %  32  3 L/min  Nasal cannula  --   04/08/21 1722  98 5 °F (36 9 °C) --  --  --  --  --  --  --  --  --   04/08/21 1700  -- 116Abnormal   24Abnormal   106/56  75  93 %  28  2 L/min  Nasal cannula  --   04/08/21 1640  -- 122Abnormal   22  114/66  --  88 %Abnormal    --  --  None (Room air)       Pertinent Labs/Diagnostic Test Results:   4/9 CXR:   Bilateral ground glass opacities which are consistent with Covid-19 pneumonia     4/8 EKG: Sinus tachycardia  Possible Inferior infarct , age undetermined  Possible Anterior infarct , age undetermined  Abnormal ECG  No previous ECGs available  Results from last 7 days   Lab Units 04/08/21  1729   SARS-COV-2  Positive*     Results from last 7 days   Lab Units 04/09/21  0456 04/08/21  2141 04/08/21  1729   WBC Thousand/uL 5 91  --  6 71   HEMOGLOBIN g/dL 15 7  --  14 5   HEMATOCRIT % 47 1  --  41 8   PLATELETS Thousands/uL 193 163 163   NEUTROS ABS Thousands/µL 4 48  --  4 78         Results from last 7 days   Lab Units 04/09/21  0456 04/08/21  1729   SODIUM mmol/L 136 129*   POTASSIUM mmol/L 4 0 3 2*   CHLORIDE mmol/L 101 97*   CO2 mmol/L 22 21   ANION GAP mmol/L 13 11   BUN mg/dL 11 8   CREATININE mg/dL 0 93 0 99   EGFR ml/min/1 73sq m 106 98   CALCIUM mg/dL 8 7 8 2*     Results from last 7 days   Lab Units 04/09/21  0456 04/08/21  1729   AST U/L 46* 42   ALT U/L 52 43   ALK PHOS U/L 50 43*   TOTAL PROTEIN g/dL 8 2 7 3   ALBUMIN g/dL 3 0* 2 9*   TOTAL BILIRUBIN mg/dL 0 42 0 45         Results from last 7 days   Lab Units 04/09/21  0456 04/08/21  1729   GLUCOSE RANDOM mg/dL 325* 294*     Results from last 7 days   Lab Units 04/08/21  2141   CK TOTAL U/L 118     Results from last 7 days   Lab Units 04/09/21  0456 04/08/21  2141   TROPONIN I ng/mL <0 02 <0 02     Results from last 7 days   Lab Units 04/09/21  0456 04/08/21  2141   D-DIMER QUANTITATIVE ug/ml FEU 1 21* 0 84*       Results from last 7 days   Lab Units 04/08/21  1729   PROCALCITONIN ng/ml 0 22     Results from last 7 days   Lab Units 04/08/21  1729   LACTIC ACID mmol/L 1 8     Results from last 7 days   Lab Units 04/09/21  0456 04/08/21  2141   FERRITIN ng/mL 1,600* 1,425*     Results from last 7 days   Lab Units 04/08/21  2141   HEP B S AG  Non-reactive   HEP C AB  Non-reactive   HEP B C IGM  Non-reactive   HEP B C TOTAL AB  Non-reactive         Results from last 7 days   Lab Units 04/09/21  0456 04/08/21  2141   CRP mg/L 137 7* 111 4*     Results from last 7 days   Lab Units 04/08/21  1729   INFLUENZA A PCR  Negative   INFLUENZA B PCR  Negative   RSV PCR  Negative             Results from last 7 days   Lab Units 04/08/21  1723 BLOOD CULTURE  Received in Microbiology Lab  Culture in Progress  Received in Microbiology Lab  Culture in Progress       ED Treatment:   Medication Administration from 04/08/2021 1635 to 04/09/2021 1220       Date/Time Order Dose Route Action     04/08/2021 1741 ceftriaxone (ROCEPHIN) 1 g/50 mL in dextrose IVPB 1,000 mg Intravenous New Bag     04/08/2021 1741 doxycycline hyclate (VIBRAMYCIN) capsule 100 mg 100 mg Oral Given     04/08/2021 1741 sodium chloride 0 9 % bolus 1,000 mL 1,000 mL Intravenous New Bag     04/08/2021 1912 potassium chloride 20 mEq IVPB (premix) 20 mEq Intravenous New Bag     04/09/2021 0846 cholecalciferol (VITAMIN D3) tablet 2,000 Units 2,000 Units Oral Given     04/09/2021 0845 ascorbic acid (VITAMIN C) tablet 1,000 mg 1,000 mg Oral Given     04/08/2021 2142 ascorbic acid (VITAMIN C) tablet 1,000 mg 1,000 mg Oral Given     04/09/2021 0846 zinc sulfate (ZINCATE) capsule 220 mg 220 mg Oral Given     04/08/2021 2142 dexamethasone (DECADRON) injection 6 mg 6 mg Intravenous Given     04/09/2021 0846 famotidine (PEPCID) tablet 20 mg 20 mg Oral Given     04/09/2021 0235 famotidine (PEPCID) tablet 20 mg 20 mg Oral Given     04/09/2021 0846 enoxaparin (LOVENOX) subcutaneous injection 40 mg 40 mg Subcutaneous Given     04/09/2021 0234 enoxaparin (LOVENOX) subcutaneous injection 40 mg 40 mg Subcutaneous Given     04/08/2021 2213 remdesivir (Veklury) 200 mg in sodium chloride 0 9 % 250 mL IVPB 0 mg Intravenous Stopped     04/08/2021 2143 remdesivir (Veklury) 200 mg in sodium chloride 0 9 % 250 mL IVPB 200 mg Intravenous New Bag     04/09/2021 1212 guaiFENesin (MUCINEX) 12 hr tablet 600 mg 600 mg Oral Given            Admitting Diagnosis: SOB (shortness of breath) [R06 02]  Age/Sex: 29 y o  male  Admission Orders:  UP and OOB  [place]    Scheduled Medications:  ascorbic acid, 1,000 mg, Oral, Q12H [facility]  cholecalciferol, 2,000 Units, Oral, Daily  dexamethasone, 6 mg, Intravenous, Q24H  docusate sodium, 100 mg, Oral, BID  enoxaparin, 40 mg, Subcutaneous, Q12H [facility]  famotidine, 20 mg, Oral, BID  guaiFENesin, 600 mg, Oral, Q12H [facility]  zinc sulfate, 220 mg, Oral, Daily    Followed by  Leslie Soliz ON 4/16/2021] multivitamin-minerals, 1 tablet, Oral, Daily  remdesivir, 100 mg, Intravenous, Q24H      Continuous IV Infusions:     PRN Meds:  acetaminophen, 650 mg, Oral, Q6H PRN  aluminum-magnesium hydroxide-simethicone, 30 mL, Oral, Q6H PRN  ondansetron, 4 mg, Intravenous, Q6H PRN        None    Network Utilization Review Department  ATTENTION: Please call with any questions or concerns to [phone] and carefully listen to the prompts so that you are directed to the right person  All voicemails are confidential   Carloz Bruce all requests for admission clinical reviews, approved or denied determinations and any other requests to dedicated fax number below belonging to the campus where the patient is receiving treatment   List of dedicated fax numbers for the Facilities:  [address] DENIALS (Administrative/Medical Necessity) [phone]   [address] (Maternity/NICU/Pediatrics) [phone] [address] Dr Kamran Fields 7945 (  Tonya Carrasco "Kacey" 103) [address] [place] [address] Alon Davis 1481 [address] [place] [address] [phone]

## 2021-04-09 NOTE — ED NOTES
Answered pt call bell pt states the medicine he was given for his cough is not really helping will notify RN Ruel Kocher  04/09/21 1364

## 2021-04-09 NOTE — PHYSICAL THERAPY NOTE
PT Evaluation (12min)  (8:32-8:44)    History reviewed  No pertinent past medical history  04/09/21 0832   PT Last Visit   PT Visit Date 04/09/21   Note Type   Note type Evaluation   Pain Assessment   Pain Assessment Tool Pain Assessment not indicated - pt denies pain   Home Living   Type of [address] [place] [address] Two level;Stairs to enter with rails  (2 JACEK; 12 steps to 2nd floor)   Bathroom Shower/Tub Tub/shower unit   Bathroom Toilet Standard   Bathroom Accessibility Accessible   Prior Function   Level of [place] Independent with ADLs and functional mobility   Lives With Spouse; Family   Receives Help From Family   ADL Assistance Independent   IADLs Independent   Falls in the last 6 months 0   Vocational Full time employment   Comments (+) drives   Restrictions/Precautions   Weight Bearing Precautions Per Order No   Other Precautions Multiple lines;Telemetry;O2;Fall Risk;Contact/isolation; Airborne/isolation  (COVID-19 (+))   General   Additional Pertinent History pt presents to [facility] c PNA 2* COVID-19  PT consulted for mobility + d/c planning  Family/Caregiver Present No   Cognition   Orientation Level Oriented X4   RUE Assessment   RUE Assessment WFL  (grossly 3/5)   LUE Assessment   LUE Assessment WFL  (grossly 3/5)   RLE Assessment   RLE Assessment WFL  (4-/5)   LLE Assessment   LLE Assessment WFL  (4-/5)   Coordination   Sensation WFL   Bed Mobility   Supine to Sit 6  Modified independent   Additional items HOB elevated   Transfers   Sit to Stand 6  Modified independent   Stand to Sit 6  Modified independent   Ambulation/Elevation   Gait pattern Decreased foot clearance; Inconsistent eliseo; Short stride   Gait Assistance 7  Independent   Assistive Device None   Distance 5' + 15'x2 c seated rest 2* fatigue/SOB   Stair Management Assistance Not tested  (pt c no concerns)   Balance   Static Sitting Good   Dynamic Sitting Good   Static Standing Fair +   Dynamic Standing [address] 7149 Endurance Deficit   Endurance Deficit Yes   Activity Tolerance   Activity Tolerance Patient limited by fatigue   Nurse Made Aware RN aware pt ambulates (I)  no skilled PT indicated  Assessment   Prognosis Good   Problem List Decreased strength;Decreased endurance; Impaired balance;Decreased mobility   Assessment pt is a 35y/o m who presents to [facility] c PNA 2* COVID-19  at baseline, pt (I) c functional mobility tasks  resides c spouse + children in 2 story home c 2 JACEK + 12 steps to 2nd floor  currently presents c deficits in strength, balance, gait quality, + activity tolerance noted in PT exam above  Barthel Index 75/100, Modified Moody 1  despite above impairments, pt able to complete mobility tasks mod (I)/(I)  ambulated house hold distances s overt loss of balance, however easily fatigues requiring frequent rests btwn transitions  further skilled PT not indicated at this time  AM-PAC raw score 24 indicating pt safe for d/c home, however please refer to PT d/c recommendations for safe d/c planning  PT eval of high complexity 2* unstable med status c pt requiring ongoing medical management 2* COVID-19  pt currently on O2 which pt does not use at baseline; rapidly fatigues c short bouts of activity  Barriers to Discharge None   Goals   Patient Goals "to get back to work"     Plan   Treatment/Interventions Spoke to nursing   Recommendation   PT Discharge Recommendation Return to previous environment with no needs  (d/c PT)   PT - OK to Discharge Yes   [address] Mobility Inpatient   Turning in Bed Without Bedrails 4   Lying on Back to Sitting on Edge of Flat Bed 4   Moving Bed to Chair 4   Standing Up From Chair 4   Walk in Room 4   Climb 3-5 Stairs 4   Basic Mobility Inpatient Raw Score 24   Basic Mobility Standardized Score 57 68   Modified Saundra Scale   Modified [place] Scale 1   Barthel Index   Feeding 10   Bathing 5   Grooming Score 5   Dressing Score 10   Bladder Score 10   Bowels Score 10   Toilet Use Score 10   Transfers (Bed/Chair) Score 15   Mobility (Level Surface) Score 0   Stairs Score 0   Barthel Index Score 75     Re Pearce, PT, DPT

## 2021-04-09 NOTE — PROGRESS NOTES
[address] [facility]  Progress Note - Kaleigh Murphy 1985, 29 y o  male MRN: [id number]  Unit/Bed#: ED 27 Encounter: [id number]  Primary Care Provider: No primary care provider on file  Date and time admitted to hospital: 4/8/2021  4:36 PM    Acute respiratory failure with hypoxia (HCC)  Assessment & Plan  · Secondary to covid 19 pneumonia  · See plan for covid 19 for further management    Electrolyte abnormality  Assessment & Plan  · Likely secondary to dehydration and poor po intake  · Now resolved with hydration and supplementation, monitor daily    Results from last 7 days   Lab Units 04/09/21  0456 04/08/21  1729   SODIUM mmol/L 136 129*     Results from last 7 days   Lab Units 04/09/21  0456 04/08/21  1729   POTASSIUM mmol/L 4 0 3 2*           SOB (shortness of breath)  Assessment & Plan  · Likely in the setting of COVID-19 pneumonia  · Plan for principal problem   · symptom management    * Pneumonia due to COVID-19 virus  Assessment & Plan   Mild Covid 19 pneumonia   Continue decadron, vitamin therapy, remdesivir    Procalcitonin negative, discontinue antibiotics   D dimer<2 5, continue current DVT prophylaxis   Trend inflammatory markers   Monitor oxygen status      VTE Pharmacologic Prophylaxis:   Pharmacologic: Enoxaparin (Lovenox)  Mechanical VTE Prophylaxis in Place: Yes    Patient Centered Rounds: I have performed bedside rounds with nursing staff today  Discussions with Specialists or Other Care Team Provider: na      Time Spent for Care: 30 minutes  More than 50% of total time spent on counseling and coordination of care as described above      Current Length of Stay: 1 day(s)    Current Patient Status: Inpatient   Certification Statement: The patient will continue to require additional inpatient hospital stay due to hypoxic respiratory failure    Discharge Plan: 48-72 hours, pending stabilization of respiratory status     Code Status: Level 1 - Full Code      Subjective:   Patient feels better today, shortness of breath improving  No chest pain or chest palpitations  Does report persistent cough  Appetite is better  Objective:     Vitals:   Temp (24hrs), Av 6 °F (37 °C), Min:98 6 °F (37 °C), Max:98 6 °F (37 °C)    Temp:  [98 6 °F (37 °C)] 98 6 °F (37 °C)  HR:  [] 103  Resp:  [16-20] 18  BP: ()/(59-86) 122/86  SpO2:  [91 %-95 %] 93 %  Body mass index is 35 44 kg/m²  Input and Output Summary (last 24 hours): Intake/Output Summary (Last 24 hours) at 2021 1806  Last data filed at 2021 2213  Gross per 24 hour   Intake 1400 ml   Output --   Net 1400 ml       Physical Exam:     Physical Exam  Vitals signs and nursing note reviewed  Constitutional:       Appearance: Normal appearance  HENT:      Head: Normocephalic and atraumatic  Nose: Nose normal  No congestion  Mouth/Throat:      Mouth: Mucous membranes are dry  Pharynx: Oropharynx is clear  Eyes:      General:         Right eye: No discharge  Left eye: No discharge  Neck:      Musculoskeletal: Normal range of motion and neck supple  Cardiovascular:      Rate and Rhythm: Normal rate and regular rhythm  Pulmonary:      Comments: Poor inspiratory effort  Conversational dyspnea noted  On 4 liters supplemental oxygen  Abdominal:      General: Abdomen is flat  Palpations: Abdomen is soft  Musculoskeletal:      Right lower leg: No edema  Left lower leg: No edema  Skin:     General: Skin is warm and dry  Neurological:      General: No focal deficit present  Mental Status: He is alert  Mental status is at baseline     Psychiatric:         Mood and Affect: Mood normal          Behavior: Behavior normal            Additional Data:     Labs:    Results from last 7 days   Lab Units 21  0456   WBC Thousand/uL 5 91   HEMOGLOBIN g/dL 15 7   HEMATOCRIT % 47 1   PLATELETS Thousands/uL 193   NEUTROS PCT % 75   LYMPHS PCT % 16   MONOS PCT % 6   EOS PCT % 0     Results from last 7 days   Lab Units 04/09/21  0456   SODIUM mmol/L 136   POTASSIUM mmol/L 4 0   CHLORIDE mmol/L 101   CO2 mmol/L 22   BUN mg/dL 11   CREATININE mg/dL 0 93   ANION GAP mmol/L 13   CALCIUM mg/dL 8 7   ALBUMIN g/dL 3 0*   TOTAL BILIRUBIN mg/dL 0 42   ALK PHOS U/L 50   ALT U/L 52   AST U/L 46*   GLUCOSE RANDOM mg/dL 325*                 Results from last 7 days   Lab Units 04/08/21  1729   LACTIC ACID mmol/L 1 8   PROCALCITONIN ng/ml 0 22           * I Have Reviewed All Lab Data Listed Above  * Additional Pertinent Lab Tests Reviewed: Umair 66 Admission Reviewed    Imaging:    Imaging Reports Reviewed Today Include: CXR  Imaging Personally Reviewed by Myself Includes:  CXR    Recent Cultures (last 7 days):     Results from last 7 days   Lab Units 04/08/21  1729   BLOOD CULTURE  Received in Microbiology Lab  Culture in Progress  Received in Microbiology Lab  Culture in Progress         Last 24 Hours Medication List:   Current Facility-Administered Medications   Medication Dose Route Frequency Provider Last Rate    acetaminophen  650 mg Oral Q6H PRN PARUL Mahoney      aluminum-magnesium hydroxide-simethicone  30 mL Oral Q6H PRN PARUL Mahoney      ascorbic acid  1,000 mg Oral Q12H [facility] PARUL Mahoney      cholecalciferol  2,000 Units Oral Daily PARUL Mahoney      dexamethasone  6 mg Intravenous Q24H PARUL Mahoney      docusate sodium  100 mg Oral BID PARUL Mahoney      enoxaparin  40 mg Subcutaneous Q12H [facility] PARUL Mahoney      famotidine  20 mg Oral BID PARUL Mahoney      guaiFENesin  600 mg Oral Q12H [facility] Belem Rodriguez MD      zinc sulfate  220 mg Oral Daily PARUL Mahoney      Followed by   Heriberto Benton ON 4/16/2021] multivitamin-minerals  1 tablet Oral Daily PARUL Mahoney      ondansetron  4 mg Intravenous Q6H PRN PARUL Mahoney      remdesivir  100 mg Intravenous Q24H PARUL Harper          Today, Patient Was Seen By: YUNI Howard      ** Please Note: Dictation voice to text software may have been used in the creation of this document   **

## 2021-04-10 LAB
ALBUMIN SERPL BCP-MCNC: 2.7 G/DL (ref 3.5–5)
ALP SERPL-CCNC: 63 U/L (ref 46–116)
ALT SERPL W P-5'-P-CCNC: 53 U/L (ref 12–78)
ANION GAP SERPL CALCULATED.3IONS-SCNC: 12 MMOL/L (ref 4–13)
AST SERPL W P-5'-P-CCNC: 43 U/L (ref 5–45)
BASOPHILS # BLD AUTO: 0.01 THOUSANDS/ΜL (ref 0–0.1)
BASOPHILS NFR BLD AUTO: 0 % (ref 0–1)
BILIRUB SERPL-MCNC: 0.32 MG/DL (ref 0.2–1)
BUN SERPL-MCNC: 16 MG/DL (ref 5–25)
CALCIUM ALBUM COR SERPL-MCNC: 9.9 MG/DL (ref 8.3–10.1)
CALCIUM SERPL-MCNC: 8.9 MG/DL (ref 8.3–10.1)
CHLORIDE SERPL-SCNC: 103 MMOL/L (ref 100–108)
CO2 SERPL-SCNC: 21 MMOL/L (ref 21–32)
CREAT SERPL-MCNC: 0.86 MG/DL (ref 0.6–1.3)
CRP SERPL QL: 71.3 MG/L
D DIMER PPP FEU-MCNC: 0.68 UG/ML FEU
EOSINOPHIL # BLD AUTO: 0 THOUSAND/ΜL (ref 0–0.61)
EOSINOPHIL NFR BLD AUTO: 0 % (ref 0–6)
ERYTHROCYTE [DISTWIDTH] IN BLOOD BY AUTOMATED COUNT: 11.8 % (ref 11.6–15.1)
FERRITIN SERPL-MCNC: 1791 NG/ML (ref 8–388)
GFR SERPL CREATININE-BSD FRML MDRD: 112 ML/MIN/1.73SQ M
GLUCOSE SERPL-MCNC: 417 MG/DL (ref 65–140)
HCT VFR BLD AUTO: 42 % (ref 36.5–49.3)
HGB BLD-MCNC: 14.4 G/DL (ref 12–17)
IMM GRANULOCYTES # BLD AUTO: 0.1 THOUSAND/UL (ref 0–0.2)
IMM GRANULOCYTES NFR BLD AUTO: 1 % (ref 0–2)
LYMPHOCYTES # BLD AUTO: 1.32 THOUSANDS/ΜL (ref 0.6–4.47)
LYMPHOCYTES NFR BLD AUTO: 19 % (ref 14–44)
MAGNESIUM SERPL-MCNC: 2.1 MG/DL (ref 1.6–2.6)
MCH RBC QN AUTO: 29.5 PG (ref 26.8–34.3)
MCHC RBC AUTO-ENTMCNC: 34.3 G/DL (ref 31.4–37.4)
MCV RBC AUTO: 86 FL (ref 82–98)
MONOCYTES # BLD AUTO: 0.57 THOUSAND/ΜL (ref 0.17–1.22)
MONOCYTES NFR BLD AUTO: 8 % (ref 4–12)
NEUTROPHILS # BLD AUTO: 5.06 THOUSANDS/ΜL (ref 1.85–7.62)
NEUTS SEG NFR BLD AUTO: 72 % (ref 43–75)
NRBC BLD AUTO-RTO: 0 /100 WBCS
PLATELET # BLD AUTO: 214 THOUSANDS/UL (ref 149–390)
PMV BLD AUTO: 9.9 FL (ref 8.9–12.7)
POTASSIUM SERPL-SCNC: 4.5 MMOL/L (ref 3.5–5.3)
PROCALCITONIN SERPL-MCNC: 0.18 NG/ML
PROT SERPL-MCNC: 7.5 G/DL (ref 6.4–8.2)
RBC # BLD AUTO: 4.88 MILLION/UL (ref 3.88–5.62)
SODIUM SERPL-SCNC: 136 MMOL/L (ref 136–145)
WBC # BLD AUTO: 7.06 THOUSAND/UL (ref 4.31–10.16)

## 2021-04-10 PROCEDURE — 99232 SBSQ HOSP IP/OBS MODERATE 35: CPT | Performed by: STUDENT IN AN ORGANIZED HEALTH CARE EDUCATION/TRAINING PROGRAM

## 2021-04-10 PROCEDURE — 82728 ASSAY OF FERRITIN: CPT | Performed by: STUDENT IN AN ORGANIZED HEALTH CARE EDUCATION/TRAINING PROGRAM

## 2021-04-10 PROCEDURE — 80053 COMPREHEN METABOLIC PANEL: CPT | Performed by: STUDENT IN AN ORGANIZED HEALTH CARE EDUCATION/TRAINING PROGRAM

## 2021-04-10 PROCEDURE — 85379 FIBRIN DEGRADATION QUANT: CPT | Performed by: STUDENT IN AN ORGANIZED HEALTH CARE EDUCATION/TRAINING PROGRAM

## 2021-04-10 PROCEDURE — 85025 COMPLETE CBC W/AUTO DIFF WBC: CPT | Performed by: STUDENT IN AN ORGANIZED HEALTH CARE EDUCATION/TRAINING PROGRAM

## 2021-04-10 PROCEDURE — 86140 C-REACTIVE PROTEIN: CPT | Performed by: STUDENT IN AN ORGANIZED HEALTH CARE EDUCATION/TRAINING PROGRAM

## 2021-04-10 PROCEDURE — 83735 ASSAY OF MAGNESIUM: CPT | Performed by: STUDENT IN AN ORGANIZED HEALTH CARE EDUCATION/TRAINING PROGRAM

## 2021-04-10 RX ORDER — GUAIFENESIN/DEXTROMETHORPHAN 100-10MG/5
10 SYRUP ORAL EVERY 4 HOURS PRN
Status: DISCONTINUED | OUTPATIENT
Start: 2021-04-10 | End: 2021-04-10

## 2021-04-10 RX ORDER — HYDROCODONE POLISTIREX AND CHLORPHENIRAMINE POLISTIREX 10; 8 MG/5ML; MG/5ML
5 SUSPENSION, EXTENDED RELEASE ORAL EVERY 8 HOURS PRN
Status: DISCONTINUED | OUTPATIENT
Start: 2021-04-10 | End: 2021-04-10

## 2021-04-10 RX ORDER — HYDROCODONE POLISTIREX AND CHLORPHENIRAMINE POLISTIREX 10; 8 MG/5ML; MG/5ML
5 SUSPENSION, EXTENDED RELEASE ORAL EVERY 12 HOURS PRN
Status: DISCONTINUED | OUTPATIENT
Start: 2021-04-10 | End: 2021-04-12 | Stop reason: HOSPADM

## 2021-04-10 RX ORDER — HYDROCODONE POLISTIREX AND CHLORPHENIRAMINE POLISTIREX 10; 8 MG/5ML; MG/5ML
5 SUSPENSION, EXTENDED RELEASE ORAL EVERY 12 HOURS PRN
Status: DISCONTINUED | OUTPATIENT
Start: 2021-04-10 | End: 2021-04-10

## 2021-04-10 RX ADMIN — ZINC SULFATE 220 MG (50 MG) CAPSULE 220 MG: CAPSULE at 08:39

## 2021-04-10 RX ADMIN — GUAIFENESIN AND DEXTROMETHORPHAN 10 ML: 100; 10 SYRUP ORAL at 10:01

## 2021-04-10 RX ADMIN — DEXAMETHASONE SODIUM PHOSPHATE 6 MG: 4 INJECTION, SOLUTION INTRA-ARTICULAR; INTRALESIONAL; INTRAMUSCULAR; INTRAVENOUS; SOFT TISSUE at 20:45

## 2021-04-10 RX ADMIN — FAMOTIDINE 20 MG: 20 TABLET ORAL at 17:22

## 2021-04-10 RX ADMIN — GUAIFENESIN 600 MG: 600 TABLET ORAL at 08:39

## 2021-04-10 RX ADMIN — Medication 2000 UNITS: at 08:37

## 2021-04-10 RX ADMIN — REMDESIVIR 100 MG: 100 INJECTION, POWDER, LYOPHILIZED, FOR SOLUTION INTRAVENOUS at 20:45

## 2021-04-10 RX ADMIN — HYDROCODONE POLISTIREX AND CHLORPHENIRAMINE POLISTIREX 5 ML: 10; 8 SUSPENSION, EXTENDED RELEASE ORAL at 18:20

## 2021-04-10 RX ADMIN — OXYCODONE HYDROCHLORIDE AND ACETAMINOPHEN 1000 MG: 500 TABLET ORAL at 20:57

## 2021-04-10 RX ADMIN — OXYCODONE HYDROCHLORIDE AND ACETAMINOPHEN 1000 MG: 500 TABLET ORAL at 08:38

## 2021-04-10 RX ADMIN — FAMOTIDINE 20 MG: 20 TABLET ORAL at 08:39

## 2021-04-10 RX ADMIN — HYDROXYZINE HYDROCHLORIDE 25 MG: 25 TABLET, FILM COATED ORAL at 01:42

## 2021-04-10 NOTE — PROGRESS NOTES
[address] [facility]  Progress Note - Siomara Manzano 1985, 29 y o  male MRN: [id number]  Unit/Bed#: -01 Encounter: [id number]  Primary Care Provider: No primary care provider on file  Date and time admitted to hospital: 4/8/2021  4:36 PM    * Pneumonia due to COVID-19 virus  Assessment & Plan   Mild Covid 19 pneumonia   Continue decadron, vitamin therapy, remdesivir    Procalcitonin negative, discontinue antibiotics   D dimer<2 5, continue current DVT prophylaxis   Trend inflammatory markers   Monitor oxygen status    SOB (shortness of breath)  Assessment & Plan  · Likely in the setting of COVID-19 pneumonia  · Plan for principal problem   · symptom management    Acute respiratory failure with hypoxia (HCC)  Assessment & Plan  · Secondary to covid 19 pneumonia  · See plan for covid 19 for further management    Electrolyte abnormality  Assessment & Plan  · Likely secondary to dehydration and poor po intake  · Now resolved with hydration and supplementation, monitor daily    Results from last 7 days   Lab Units 04/10/21  0505 04/09/21  0456 04/08/21  1729   SODIUM mmol/L 136 136 129*     Results from last 7 days   Lab Units 04/10/21  0505 04/09/21  0456 04/08/21  1729   POTASSIUM mmol/L 4 5 4 0 3 2*           VTE Pharmacologic Prophylaxis:   Pharmacologic: Enoxaparin (Lovenox)  Mechanical VTE Prophylaxis in Place: Yes    Patient Centered Rounds: I have performed bedside rounds with nursing staff today  Time Spent for Care: 30 minutes  More than 50% of total time spent on counseling and coordination of care as described above      Current Length of Stay: 2 day(s)    Current Patient Status: Inpatient   Certification Statement: The patient will continue to require additional inpatient hospital stay due to Shortness of breath, Hypoxic respiratory failure     Discharge Plan: 24-48 hours    Code Status: Level 1 - Full Code      Subjective:   Patient feels better today, no chest pain or chest palpitations  Shortness of breath improving  Appetite is good  Objective:     Vitals:   Temp (24hrs), Av °F (36 7 °C), Min:97 8 °F (36 6 °C), Max:98 1 °F (36 7 °C)    Temp:  [97 8 °F (36 6 °C)-98 1 °F (36 7 °C)] 97 8 °F (36 6 °C)  HR:  [] 81  Resp:  [16-20] 16  BP: (121-130)/(65-91) 121/65  SpO2:  [92 %-94 %] 94 %  Body mass index is 35 44 kg/m²  Input and Output Summary (last 24 hours): Intake/Output Summary (Last 24 hours) at 4/10/2021 1004  Last data filed at 4/10/2021 0250  Gross per 24 hour   Intake 480 ml   Output --   Net 480 ml       Physical Exam:     Physical Exam  Vitals signs and nursing note reviewed  Constitutional:       Appearance: Normal appearance  HENT:      Head: Normocephalic and atraumatic  Nose: Nose normal  No congestion  Mouth/Throat:      Mouth: Mucous membranes are dry  Pharynx: Oropharynx is clear  Eyes:      General:         Right eye: No discharge  Left eye: No discharge  Neck:      Musculoskeletal: Normal range of motion and neck supple  Cardiovascular:      Rate and Rhythm: Normal rate and regular rhythm  Pulmonary:      Effort: Pulmonary effort is normal  No respiratory distress  Comments: 4 liters nasal canula   Abdominal:      General: Abdomen is flat  Palpations: Abdomen is soft  Musculoskeletal:      Right lower leg: No edema  Left lower leg: No edema  Skin:     General: Skin is warm and dry  Neurological:      General: No focal deficit present  Mental Status: He is alert  Mental status is at baseline     Psychiatric:         Mood and Affect: Mood normal          Behavior: Behavior normal            Additional Data:     Labs:    Results from last 7 days   Lab Units 04/10/21  0505   WBC Thousand/uL 7 06   HEMOGLOBIN g/dL 14 4   HEMATOCRIT % 42 0   PLATELETS Thousands/uL 214   NEUTROS PCT % 72   LYMPHS PCT % 19   MONOS PCT % 8   EOS PCT % 0     Results from last 7 days   Lab Units 04/10/21  0505   SODIUM mmol/L 136   POTASSIUM mmol/L 4 5   CHLORIDE mmol/L 103   CO2 mmol/L 21   BUN mg/dL 16   CREATININE mg/dL 0 86   ANION GAP mmol/L 12   CALCIUM mg/dL 8 9   ALBUMIN g/dL 2 7*   TOTAL BILIRUBIN mg/dL 0 32   ALK PHOS U/L 63   ALT U/L 53   AST U/L 43   GLUCOSE RANDOM mg/dL 417*                 Results from last 7 days   Lab Units 04/08/21  1729   LACTIC ACID mmol/L 1 8   PROCALCITONIN ng/ml 0 22           * I Have Reviewed All Lab Data Listed Above  * Additional Pertinent Lab Tests Reviewed: Umair 66 Admission Reviewed    Imaging:    Imaging Reports Reviewed Today Include: NA  Imaging Personally Reviewed by Myself Includes:  NA    Recent Cultures (last 7 days):     Results from last 7 days   Lab Units 04/08/21  1729   BLOOD CULTURE  No Growth at 24 hrs  No Growth at 24 hrs         Last 24 Hours Medication List:   Current Facility-Administered Medications   Medication Dose Route Frequency Provider Last Rate    acetaminophen  650 mg Oral Q6H PRN PARUL Decker      aluminum-magnesium hydroxide-simethicone  30 mL Oral Q6H PRN PARUL Decker      ascorbic acid  1,000 mg Oral Q12H [address] PARUL Decker      cholecalciferol  2,000 Units Oral Daily PARUL Decker      dexamethasone  6 mg Intravenous Q24H PARUL Decker      dextromethorphan-guaiFENesin  10 mL Oral Q4H PRN Paul Fay MD      docusate sodium  100 mg Oral BID PARUL Decker      enoxaparin  40 mg Subcutaneous Q12H [address] PARUL Decker      famotidine  20 mg Oral BID PARUL Decker      hydrOXYzine HCL  25 mg Oral Q6H PRN PARUL Huerta      zinc sulfate  220 mg Oral Daily PARUL Decker      Followed by   Gretel Alicia ON 4/16/2021] multivitamin-minerals  1 tablet Oral Daily PARUL Decker      ondansetron  4 mg Intravenous Q6H PRN PARUL Decker      remdesivir  100 mg Intravenous Q24H PARUL Decker Today, Patient Was Seen By: YUNI Pitts      ** Please Note: Dictation voice to text software may have been used in the creation of this document   **

## 2021-04-10 NOTE — UTILIZATION REVIEW
Continued Stay Review    Date:     FOR  4/9                          Current Patient Class:   Inpatient  Current Level of Care:   Med surg    HPI:36 y o  male initially admitted on    4/8  with Pneumonia  Due to COVID  19 virus    Assessment/Plan:   4/9     Continue  IV  Decadron and IV  Remdesivir  Remains  On  O2  3-4 L  NC  Monitor respiratory status  Continue to monitor labs  Electrolytes improved  Continue symptom management      Pertinent Labs/Diagnostic Results:   Results from last 7 days   Lab Units 04/08/21  1729   SARS-COV-2  Positive*     Results from last 7 days   Lab Units 04/10/21  0505 04/09/21  0456 04/08/21  2141 04/08/21  1729   WBC Thousand/uL 7 06 5 91  --  6 71   HEMOGLOBIN g/dL 14 4 15 7  --  14 5   HEMATOCRIT % 42 0 47 1  --  41 8   PLATELETS Thousands/uL 214 193 163 163   NEUTROS ABS Thousands/µL 5 06 4 48  --  4 78         Results from last 7 days   Lab Units 04/10/21  0505 04/09/21  0456 04/08/21  1729   SODIUM mmol/L 136 136 129*   POTASSIUM mmol/L 4 5 4 0 3 2*   CHLORIDE mmol/L 103 101 97*   CO2 mmol/L 21 22 21   ANION GAP mmol/L 12 13 11   BUN mg/dL 16 11 8   CREATININE mg/dL 0 86 0 93 0 99   EGFR ml/min/1 73sq m 112 106 98   CALCIUM mg/dL 8 9 8 7 8 2*   MAGNESIUM mg/dL 2 1  --   --      Results from last 7 days   Lab Units 04/10/21  0505 04/09/21  0456 04/08/21  1729   AST U/L 43 46* 42   ALT U/L 53 52 43   ALK PHOS U/L 63 50 43*   TOTAL PROTEIN g/dL 7 5 8 2 7 3   ALBUMIN g/dL 2 7* 3 0* 2 9*   TOTAL BILIRUBIN mg/dL 0 32 0 42 0 45         Results from last 7 days   Lab Units 04/10/21  0505 04/09/21  0456 04/08/21  1729   GLUCOSE RANDOM mg/dL 417* 325* 294*           Results from last 7 days   Lab Units 04/08/21  2141   CK TOTAL U/L 118     Results from last 7 days   Lab Units 04/09/21  0456 04/08/21  2141   TROPONIN I ng/mL <0 02 <0 02     Results from last 7 days   Lab Units 04/10/21  0505 04/09/21  0456 04/08/21  2141   D-DIMER QUANTITATIVE ug/ml FEU 0 68* 1 21* 0 84* Results from last 7 days   Lab Units 04/09/21  2045 04/08/21  1729   PROCALCITONIN ng/ml 0 18 0 22     Results from last 7 days   Lab Units 04/08/21  1729   LACTIC ACID mmol/L 1 8                 Results from last 7 days   Lab Units 04/10/21  0505 04/09/21  0456 04/08/21  2141   FERRITIN ng/mL 1,791* 1,600* 1,425*     Results from last 7 days   Lab Units 04/08/21  2141   HEP B S AG  Non-reactive   HEP C AB  Non-reactive   HEP B C IGM  Non-reactive   HEP B C TOTAL AB  Non-reactive         Results from last 7 days   Lab Units 04/10/21  0505 04/09/21  0456 04/08/21  2141   CRP mg/L 71 3* 137 7* 111 4*             Results from last 7 days   Lab Units 04/08/21  1729   INFLUENZA A PCR  Negative   INFLUENZA B PCR  Negative   RSV PCR  Negative               Results from last 7 days   Lab Units 04/08/21  1729   BLOOD CULTURE  No Growth at 24 hrs  No Growth at 24 hrs                 Vital Signs:    97 8 °F (36 6 °C)  81  16  121/65  84  94 %  36  4 L/min  Nasal cannula  --    04/09/21 21:38:14  98 1 °F (36 7 °C)  102  18  130/91  104  93 %  32  3 L/min  Nasal cannula  --   04/09/21 19:33:06  98 °F (36 7 °C)  105  18  129/90  103  92 %  --  --  --  --   04/09/21 1913  --  102  20  128/81  --  92 %  32  3 L/min  Nasal cannula  Sitting   04/09/21 1455  --  103  18  122/86  --  93 %  32  3 L/min  None (Room air)  Sitting   04/09/21 0530  --  87  18  116/76  92  94 %  32  3 L/min  Nasal cannula  Lying   04/09/21 0500  --  93  16  113/80  93  91 %  32  3 L/min  Nasal cannula  Lying   04/09/21 0300  --  86  20  97/59  74  93 %  32  3 L/min  Nasal cannula  Lying   04/09/21 0235  --  89  20  119/69  88  92 %  32  3 L/min  --  --   04/09/21 0106  98 6 °F (37 °C)  100  20  120/80  --  92 %  32  3 L/min  Nasal cannula  --         Medications:   Scheduled Medications:  ascorbic acid, 1,000 mg, Oral, Q12H [address]  cholecalciferol, 2,000 Units, Oral, Daily  dexamethasone, 6 mg, Intravenous, Q24H  docusate sodium, 100 mg, Oral, BID  enoxaparin, 40 mg, Subcutaneous, Q12H [facility]  famotidine, 20 mg, Oral, BID  zinc sulfate, 220 mg, Oral, Daily    Followed by  Valeria Esparza ON 4/16/2021] multivitamin-minerals, 1 tablet, Oral, Daily  remdesivir, 100 mg, Intravenous, Q24H      Continuous IV Infusions:     PRN Meds:  acetaminophen, 650 mg, Oral, Q6H PRN  aluminum-magnesium hydroxide-simethicone, 30 mL, Oral, Q6H PRN  dextromethorphan-guaiFENesin, 10 mL, Oral, Q4H PRN  hydrOXYzine HCL, 25 mg, Oral, Q6H PRN  ondansetron, 4 mg, Intravenous, Q6H PRN        Discharge Plan:    D    Network Utilization Review Department  ATTENTION: Please call with any questions or concerns to [phone] and carefully listen to the prompts so that you are directed to the right person  All voicemails are confidential   Ricardo Hudson all requests for admission clinical reviews, approved or denied determinations and any other requests to dedicated fax number below belonging to the campus where the patient is receiving treatment   List of dedicated fax numbers for the Facilities:  [address] DENIALS (Administrative/Medical Necessity) [phone]   [address] (Maternity/NICU/Pediatrics) [phone]   [address] Kamran Fields 3876 (Manisha Arguello) [address] [place] [address] Alon [address] 1481 [address] [place] [address] [phone]

## 2021-04-11 LAB
ALBUMIN SERPL BCP-MCNC: 2.9 G/DL (ref 3.5–5)
ALP SERPL-CCNC: 68 U/L (ref 46–116)
ALT SERPL W P-5'-P-CCNC: 61 U/L (ref 12–78)
ANION GAP SERPL CALCULATED.3IONS-SCNC: 12 MMOL/L (ref 4–13)
AST SERPL W P-5'-P-CCNC: 38 U/L (ref 5–45)
BASOPHILS # BLD MANUAL: 0 THOUSAND/UL (ref 0–0.1)
BASOPHILS NFR MAR MANUAL: 0 % (ref 0–1)
BILIRUB SERPL-MCNC: 0.39 MG/DL (ref 0.2–1)
BUN SERPL-MCNC: 16 MG/DL (ref 5–25)
CALCIUM ALBUM COR SERPL-MCNC: 10 MG/DL (ref 8.3–10.1)
CALCIUM SERPL-MCNC: 9.1 MG/DL (ref 8.3–10.1)
CHLORIDE SERPL-SCNC: 101 MMOL/L (ref 100–108)
CO2 SERPL-SCNC: 23 MMOL/L (ref 21–32)
CREAT SERPL-MCNC: 0.95 MG/DL (ref 0.6–1.3)
CRP SERPL QL: 44.3 MG/L
D DIMER PPP FEU-MCNC: 0.68 UG/ML FEU
EOSINOPHIL # BLD MANUAL: 0 THOUSAND/UL (ref 0–0.4)
EOSINOPHIL NFR BLD MANUAL: 0 % (ref 0–6)
ERYTHROCYTE [DISTWIDTH] IN BLOOD BY AUTOMATED COUNT: 11.8 % (ref 11.6–15.1)
FERRITIN SERPL-MCNC: 1373 NG/ML (ref 8–388)
GFR SERPL CREATININE-BSD FRML MDRD: 103 ML/MIN/1.73SQ M
GLUCOSE SERPL-MCNC: 393 MG/DL (ref 65–140)
HCT VFR BLD AUTO: 45 % (ref 36.5–49.3)
HGB BLD-MCNC: 15 G/DL (ref 12–17)
LYMPHOCYTES # BLD AUTO: 1.4 THOUSAND/UL (ref 0.6–4.47)
LYMPHOCYTES # BLD AUTO: 14 % (ref 14–44)
MAGNESIUM SERPL-MCNC: 2 MG/DL (ref 1.6–2.6)
MCH RBC QN AUTO: 29.1 PG (ref 26.8–34.3)
MCHC RBC AUTO-ENTMCNC: 33.3 G/DL (ref 31.4–37.4)
MCV RBC AUTO: 87 FL (ref 82–98)
MONOCYTES # BLD AUTO: 0.5 THOUSAND/UL (ref 0–1.22)
MONOCYTES NFR BLD: 5 % (ref 4–12)
NEUTROPHILS # BLD MANUAL: 7.88 THOUSAND/UL (ref 1.85–7.62)
NEUTS BAND NFR BLD MANUAL: 6 % (ref 0–8)
NEUTS SEG NFR BLD AUTO: 73 % (ref 43–75)
NRBC BLD AUTO-RTO: 0 /100 WBCS
PLATELET # BLD AUTO: 270 THOUSANDS/UL (ref 149–390)
PLATELET BLD QL SMEAR: ADEQUATE
PMV BLD AUTO: 9.7 FL (ref 8.9–12.7)
POTASSIUM SERPL-SCNC: 4.7 MMOL/L (ref 3.5–5.3)
PROT SERPL-MCNC: 7.6 G/DL (ref 6.4–8.2)
RBC # BLD AUTO: 5.16 MILLION/UL (ref 3.88–5.62)
RBC MORPH BLD: NORMAL
SODIUM SERPL-SCNC: 136 MMOL/L (ref 136–145)
TOTAL CELLS COUNTED SPEC: 100
VARIANT LYMPHS # BLD AUTO: 2 %
WBC # BLD AUTO: 9.98 THOUSAND/UL (ref 4.31–10.16)

## 2021-04-11 PROCEDURE — 85379 FIBRIN DEGRADATION QUANT: CPT | Performed by: STUDENT IN AN ORGANIZED HEALTH CARE EDUCATION/TRAINING PROGRAM

## 2021-04-11 PROCEDURE — 99232 SBSQ HOSP IP/OBS MODERATE 35: CPT | Performed by: STUDENT IN AN ORGANIZED HEALTH CARE EDUCATION/TRAINING PROGRAM

## 2021-04-11 PROCEDURE — 83735 ASSAY OF MAGNESIUM: CPT | Performed by: STUDENT IN AN ORGANIZED HEALTH CARE EDUCATION/TRAINING PROGRAM

## 2021-04-11 PROCEDURE — 85027 COMPLETE CBC AUTOMATED: CPT | Performed by: STUDENT IN AN ORGANIZED HEALTH CARE EDUCATION/TRAINING PROGRAM

## 2021-04-11 PROCEDURE — 85007 BL SMEAR W/DIFF WBC COUNT: CPT | Performed by: STUDENT IN AN ORGANIZED HEALTH CARE EDUCATION/TRAINING PROGRAM

## 2021-04-11 PROCEDURE — 80053 COMPREHEN METABOLIC PANEL: CPT | Performed by: STUDENT IN AN ORGANIZED HEALTH CARE EDUCATION/TRAINING PROGRAM

## 2021-04-11 PROCEDURE — 82728 ASSAY OF FERRITIN: CPT | Performed by: STUDENT IN AN ORGANIZED HEALTH CARE EDUCATION/TRAINING PROGRAM

## 2021-04-11 PROCEDURE — 86140 C-REACTIVE PROTEIN: CPT | Performed by: STUDENT IN AN ORGANIZED HEALTH CARE EDUCATION/TRAINING PROGRAM

## 2021-04-11 RX ADMIN — FAMOTIDINE 20 MG: 20 TABLET ORAL at 18:02

## 2021-04-11 RX ADMIN — Medication 2000 UNITS: at 09:24

## 2021-04-11 RX ADMIN — OXYCODONE HYDROCHLORIDE AND ACETAMINOPHEN 1000 MG: 500 TABLET ORAL at 21:39

## 2021-04-11 RX ADMIN — FAMOTIDINE 20 MG: 20 TABLET ORAL at 09:24

## 2021-04-11 RX ADMIN — DEXAMETHASONE SODIUM PHOSPHATE 6 MG: 4 INJECTION, SOLUTION INTRA-ARTICULAR; INTRALESIONAL; INTRAMUSCULAR; INTRAVENOUS; SOFT TISSUE at 20:45

## 2021-04-11 RX ADMIN — ZINC SULFATE 220 MG (50 MG) CAPSULE 220 MG: CAPSULE at 09:24

## 2021-04-11 RX ADMIN — REMDESIVIR 100 MG: 100 INJECTION, POWDER, LYOPHILIZED, FOR SOLUTION INTRAVENOUS at 20:45

## 2021-04-11 RX ADMIN — OXYCODONE HYDROCHLORIDE AND ACETAMINOPHEN 1000 MG: 500 TABLET ORAL at 09:24

## 2021-04-11 NOTE — UTILIZATION REVIEW
Notification of Inpatient Admission/Inpatient Authorization Request   This is a Notification of Inpatient Admission for [address]  Be advised that this patient was admitted to our facility under Inpatient Status  Contact Deandre Masters at [phone] for additional admission information  [facility] DEPT DEDICATED [facility]  [phone]  Patient Name:   Lexus Berkowitz   YOB: 1985       [address]   [address]:   [address]   Tax ID: [id number]  NPI: [id number] Attending Provider/NPI:  Phone:  Address: Danielle Barlow, Sabina Spencer [[id number]]  [phone]  Same as MERI/Mariel Lewis 1106 of Service Code: 24     [facility]   Start Date: 4/8/21 1911 Discharge Date & Time: No discharge date for patient encounter  Type of Admission: Inpatient Status Discharge Disposition   (if discharged): Final discharge disposition not confirmed   Patient Diagnoses: Hypoxemia [R09 02]  Hypokalemia [E87 6]  Hyponatremia [E87 1]  SOB (shortness of breath) [R06 02]  Hyperglycemia [R73 9]  Pneumonia due to COVID-19 virus [U07 1, J12 82]     Orders: Admission Orders (From admission, onward)     Ordered        04/08/21 1911  Inpatient Admission  Once                    Assigned Utilization Review Contact: Deandre Masters  Utilization   Network Utilization Review Department  Phone: [phone]; Fax [phone]  Email: [email or web address]   ATTENTION PAYERS: Please call the assigned Utilization  directly with any questions or concerns ALL voicemails in the department are confidential  Send all requests for admission clinical reviews, approved or denied determinations and any other requests to dedicated fax number belonging to the campus where the patient is receiving treatment

## 2021-04-11 NOTE — PROGRESS NOTES
[facility]  Progress Note - Mindi Early 1985, 29 y o  male MRN: [id number]  Unit/Bed#: MS Kiana-01 Encounter: [id number]  Primary Care Provider: No primary care provider on file  Date and time admitted to hospital: 4/8/2021  4:36 PM    * Pneumonia due to COVID-19 virus  Assessment & Plan   Mild Covid 19 pneumonia   Continue decadron, vitamin therapy, remdesivir    Procalcitonin negative, discontinue antibiotics   D dimer<2 5, continue current DVT prophylaxis   Trend inflammatory markers   Monitor oxygen status    SOB (shortness of breath)  Assessment & Plan  · Likely in the setting of COVID-19 pneumonia  · Plan for principal problem   · symptom management    Acute respiratory failure with hypoxia (HCC)  Assessment & Plan  · Secondary to covid 19 pneumonia  · See plan for covid 19 for further management    Electrolyte abnormality  Assessment & Plan  · Likely secondary to dehydration and poor po intake  · Now resolved with hydration and supplementation, monitor daily    Results from last 7 days   Lab Units 04/11/21  0528 04/10/21  0505 04/09/21  0456 04/08/21  1729   SODIUM mmol/L 136 136 136 129*     Results from last 7 days   Lab Units 04/11/21  0528 04/10/21  0505 04/09/21  0456 04/08/21  1729   POTASSIUM mmol/L 4 7 4 5 4 0 3 2*             VTE Pharmacologic Prophylaxis:   Pharmacologic: Enoxaparin (Lovenox)  Mechanical VTE Prophylaxis in Place: Yes    Patient Centered Rounds: I have performed bedside rounds with nursing staff today  Time Spent for Care: 30 minutes  More than 50% of total time spent on counseling and coordination of care as described above  Current Length of Stay: 3 day(s)    Current Patient Status: Inpatient   Certification Statement: The patient will continue to require additional inpatient hospital stay due to hypoxic respiratory failure    Discharge Plan:  To be determined, 24-48 hours     Code Status: Level 1 - Full Code      Subjective:   Patient still has cough and some mild shortness of breath  No chest pain or chest palpitations  Appetite is good  Objective:     Vitals:   Temp (24hrs), Av 8 °F (36 6 °C), Min:97 6 °F (36 4 °C), Max:98 1 °F (36 7 °C)    Temp:  [97 6 °F (36 4 °C)-98 1 °F (36 7 °C)] 98 1 °F (36 7 °C)  HR:  [83-87] 83  Resp:  [18] 18  BP: (119-138)/(79-88) 138/88  SpO2:  [92 %-94 %] 94 %  Body mass index is 35 44 kg/m²  Input and Output Summary (last 24 hours): Intake/Output Summary (Last 24 hours) at 2021 1237  Last data filed at 2021 0534  Gross per 24 hour   Intake 1560 ml   Output --   Net 1560 ml       Physical Exam:     Physical Exam  Vitals signs and nursing note reviewed  Constitutional:       Appearance: Normal appearance  HENT:      Head: Normocephalic and atraumatic  Nose: Nose normal  No congestion  Mouth/Throat:      Mouth: Mucous membranes are dry  Pharynx: Oropharynx is clear  Eyes:      General:         Right eye: No discharge  Left eye: No discharge  Neck:      Musculoskeletal: Normal range of motion and neck supple  Cardiovascular:      Rate and Rhythm: Normal rate and regular rhythm  Pulmonary:      Effort: Pulmonary effort is normal  No respiratory distress  Comments: On 3 liters nasal canula   Abdominal:      General: Abdomen is flat  Palpations: Abdomen is soft  Musculoskeletal:      Right lower leg: No edema  Left lower leg: No edema  Skin:     General: Skin is warm and dry  Neurological:      General: No focal deficit present  Mental Status: He is alert  Mental status is at baseline     Psychiatric:         Mood and Affect: Mood normal          Behavior: Behavior normal            Additional Data:     Labs:    Results from last 7 days   Lab Units 21  0528 04/10/21  0505   WBC Thousand/uL 9 98 7 06   HEMOGLOBIN g/dL 15 0 14 4   HEMATOCRIT % 45 0 42 0   PLATELETS Thousands/uL 270 214   BANDS PCT % 6  --    NEUTROS PCT %  --  72 LYMPHS PCT %  --  19   LYMPHO PCT % 14  --    MONOS PCT %  --  8   MONO PCT % 5  --    EOS PCT % 0 0     Results from last 7 days   Lab Units 21  0528   SODIUM mmol/L 136   POTASSIUM mmol/L 4 7   CHLORIDE mmol/L 101   CO2 mmol/L 23   BUN mg/dL 16   CREATININE mg/dL 0 95   ANION GAP mmol/L 12   CALCIUM mg/dL 9 1   ALBUMIN g/dL 2 9*   TOTAL BILIRUBIN mg/dL 0 39   ALK PHOS U/L 68   ALT U/L 61   AST U/L 38   GLUCOSE RANDOM mg/dL 393*                 Results from last 7 days   Lab Units 21  2045 21  1729   LACTIC ACID mmol/L  --  1 8   PROCALCITONIN ng/ml 0 18 0 22           * I Have Reviewed All Lab Data Listed Above  * Additional Pertinent Lab Tests Reviewed: Umair Diop Admission Reviewed    Imaging:    Imaging Reports Reviewed Today Include: NA  Imaging Personally Reviewed by Myself Includes:  NA    Recent Cultures (last 7 days):     Results from last 7 days   Lab Units 21  1729   BLOOD CULTURE  No Growth at 48 hrs  No Growth at 48 hrs         Last 24 Hours Medication List:   Current Facility-Administered Medications   Medication Dose Route Frequency Provider Last Rate    acetaminophen  650 mg Oral Q6H PRN PARUL Phelan      aluminum-magnesium hydroxide-simethicone  30 mL Oral Q6H PRN PARUL Phelan      ascorbic acid  1,000 mg Oral Q12H [address] PARUL Phelan      cholecalciferol  2,000 Units Oral Daily PARUL Phelan      dexamethasone  6 mg Intravenous Q24H PARUL Phelan      docusate sodium  100 mg Oral BID PARUL Phelan      enoxaparin  40 mg Subcutaneous Q12H [address] PARUL Phelan      famotidine  20 mg Oral BID PARUL Phelan      hydrocodone-chlorpheniramine polistirex  5 mL Oral Q12H PRN Lillian Gamez MD      hydrOXYzine HCL  25 mg Oral Q6H PRN PARUL Rosenberg      zinc sulfate  220 mg Oral Daily PARUL Phelan      Followed by   Authur Sever ON 2021] multivitamin-minerals  1 tablet Oral Daily PARUL Phelan      ondansetron  4 mg Intravenous Q6H PRN PARUL Phelan      remdesivir  100 mg Intravenous Q24H PARUL Phelan          Today, Patient Was Seen By: YUNI Acuña      ** Please Note: Dictation voice to text software may have been used in the creation of this document   **

## 2021-04-11 NOTE — PLAN OF CARE
Problem: PAIN - ADULT  Goal: Verbalizes/displays adequate comfort level or baseline comfort level  Description: Interventions:  - Encourage patient to monitor pain and request assistance  - Assess pain using appropriate pain scale  - Administer analgesics based on type and severity of pain and evaluate response  - Implement non-pharmacological measures as appropriate and evaluate response  - Consider cultural and social influences on pain and pain management  - Notify physician/advanced practitioner if interventions unsuccessful or patient reports new pain  Outcome: Progressing     Problem: INFECTION - ADULT  Goal: Absence or prevention of progression during hospitalization  Description: INTERVENTIONS:  - Assess and monitor for signs and symptoms of infection  - Monitor lab/diagnostic results  - Monitor all insertion sites, i e  indwelling lines, tubes, and drains  - Monitor endotracheal if appropriate and nasal secretions for changes in amount and color  - [place] appropriate cooling/warming therapies per order  - Administer medications as ordered  - Instruct and encourage patient and family to use good hand hygiene technique  - Identify and instruct in appropriate isolation precautions for identified infection/condition  Outcome: Progressing  Goal: Absence of fever/infection during neutropenic period  Description: INTERVENTIONS:  - Monitor WBC    Outcome: Progressing     Problem: SAFETY ADULT  Goal: Patient will remain free of falls  Description: INTERVENTIONS:  - Assess patient frequently for physical needs  -  Identify cognitive and physical deficits and behaviors that affect risk of falls    -  [place] fall precautions as indicated by assessment   - Educate patient/family on patient safety including physical limitations  - Instruct patient to call for assistance with activity based on assessment  - Modify environment to reduce risk of injury  - Consider OT/PT consult to assist with strengthening/mobility  Outcome: Progressing  Goal: Maintain or return to baseline ADL function  Description: INTERVENTIONS:  -  Assess patient's ability to carry out ADLs; assess patient's baseline for ADL function and identify physical deficits which impact ability to perform ADLs (bathing, care of mouth/teeth, toileting, grooming, dressing, etc )  - Assess/evaluate cause of self-care deficits   - Assess range of motion  - Assess patient's mobility; develop plan if impaired  - Assess patient's need for assistive devices and provide as appropriate  - Encourage maximum independence but intervene and supervise when necessary  - Involve family in performance of ADLs  - Assess for home care needs following discharge   - Consider OT consult to assist with ADL evaluation and planning for discharge  - Provide patient education as appropriate  Outcome: Progressing  Goal: Maintain or return mobility status to optimal level  Description: INTERVENTIONS:  - Assess patient's baseline mobility status (ambulation, transfers, stairs, etc )    - Identify cognitive and physical deficits and behaviors that affect mobility  - Identify mobility aids required to assist with transfers and/or ambulation (gait belt, sit-to-stand, lift, walker, cane, etc )  - [place] fall precautions as indicated by assessment  - Record patient progress and toleration of activity level on Mobility SBAR; progress patient to next Phase/Stage  - Instruct patient to call for assistance with activity based on assessment  - Consider rehabilitation consult to assist with strengthening/weightbearing, etc   Outcome: Progressing     Problem: DISCHARGE PLANNING  Goal: Discharge to home or other facility with appropriate resources  Description: INTERVENTIONS:  - Identify barriers to discharge w/patient and caregiver  - Arrange for needed discharge resources and transportation as appropriate  - Identify discharge learning needs (meds, wound care, etc )  - Arrange for interpretive services to assist at discharge as needed  - Refer to Case Management Department for coordinating discharge planning if the patient needs post-hospital services based on physician/advanced practitioner order or complex needs related to functional status, cognitive ability, or social support system  Outcome: Progressing     Problem: Knowledge Deficit  Goal: Patient/family/caregiver demonstrates understanding of disease process, treatment plan, medications, and discharge instructions  Description: Complete learning assessment and assess knowledge base    Interventions:  - Provide teaching at level of understanding  - Provide teaching via preferred learning methods  Outcome: Progressing     Problem: RESPIRATORY - ADULT  Goal: Achieves optimal ventilation and oxygenation  Description: INTERVENTIONS:  - Assess for changes in respiratory status  - Assess for changes in mentation and behavior  - Position to facilitate oxygenation and minimize respiratory effort  - Oxygen administered by appropriate delivery if ordered  - Initiate smoking cessation education as indicated  - Encourage broncho-pulmonary hygiene including cough, deep breathe, Incentive Spirometry  - Assess the need for suctioning and aspirate as needed  - Assess and instruct to report SOB or any respiratory difficulty  - Respiratory Therapy support as indicated  Outcome: Progressing     Problem: GASTROINTESTINAL - ADULT  Goal: Minimal or absence of nausea and/or vomiting  Description: INTERVENTIONS:  - Administer IV fluids if ordered to ensure adequate hydration  - Maintain NPO status until nausea and vomiting are resolved  - Nasogastric tube if ordered  - Administer ordered antiemetic medications as needed  - Provide nonpharmacologic comfort measures as appropriate  - Advance diet as tolerated, if ordered  - Consider nutrition services referral to assist patient with adequate nutrition and appropriate food choices  Outcome: Progressing  Goal: Maintains or returns to baseline bowel function  Description: INTERVENTIONS:  - Assess bowel function  - Encourage oral fluids to ensure adequate hydration  - Administer IV fluids if ordered to ensure adequate hydration  - Administer ordered medications as needed  - Encourage mobilization and activity  - Consider nutritional services referral to assist patient with adequate nutrition and appropriate food choices  Outcome: Progressing  Goal: Maintains adequate nutritional intake  Description: INTERVENTIONS:  - Monitor percentage of each meal consumed  - Identify factors contributing to decreased intake, treat as appropriate  - Assist with meals as needed  - Monitor I&O, weight, and lab values if indicated  - Obtain nutrition services referral as needed  Outcome: Progressing  Goal: Establish and maintain optimal ostomy function  Description: INTERVENTIONS:  - Assess bowel function  - Encourage oral fluids to ensure adequate hydration  - Administer IV fluids if ordered to ensure adequate hydration   - Administer ordered medications as needed  - Encourage mobilization and activity  - Nutrition services referral to assist patient with appropriate food choices  - Assess stoma site  - Consider wound care consult   Outcome: Progressing     Problem: METABOLIC, FLUID AND ELECTROLYTES - ADULT  Goal: Electrolytes maintained within normal limits  Description: INTERVENTIONS:  - Monitor labs and assess patient for signs and symptoms of electrolyte imbalances  - Administer electrolyte replacement as ordered  - Monitor response to electrolyte replacements, including repeat lab results as appropriate  - Instruct patient on fluid and nutrition as appropriate  Outcome: Progressing  Goal: Fluid balance maintained  Description: INTERVENTIONS:  - Monitor labs   - Monitor I/O and WT  - Instruct patient on fluid and nutrition as appropriate  - Assess for signs & symptoms of volume excess or deficit  Outcome: Progressing  Goal: Glucose maintained within target range  Description: INTERVENTIONS:  - Monitor Blood Glucose as ordered  - Assess for signs and symptoms of hyperglycemia and hypoglycemia  - Administer ordered medications to maintain glucose within target range  - Assess nutritional intake and initiate nutrition service referral as needed  Outcome: Progressing     Problem: Potential for Falls  Goal: Patient will remain free of falls  Description: INTERVENTIONS:  - Assess patient frequently for physical needs  -  Identify cognitive and physical deficits and behaviors that affect risk of falls    -  [place] fall precautions as indicated by assessment   - Educate patient/family on patient safety including physical limitations  - Instruct patient to call for assistance with activity based on assessment  - Modify environment to reduce risk of injury  - Consider OT/PT consult to assist with strengthening/mobility  Outcome: Progressing

## 2021-04-11 NOTE — PLAN OF CARE
Problem: METABOLIC, FLUID AND ELECTROLYTES - ADULT  Goal: Electrolytes maintained within normal limits  Description: INTERVENTIONS:  - Monitor labs and assess patient for signs and symptoms of electrolyte imbalances  - Administer electrolyte replacement as ordered  - Monitor response to electrolyte replacements, including repeat lab results as appropriate  - Instruct patient on fluid and nutrition as appropriate  Outcome: Progressing  Goal: Fluid balance maintained  Description: INTERVENTIONS:  - Monitor labs   - Monitor I/O and WT  - Instruct patient on fluid and nutrition as appropriate  - Assess for signs & symptoms of volume excess or deficit  Outcome: Progressing  Goal: Glucose maintained within target range  Description: INTERVENTIONS:  - Monitor Blood Glucose as ordered  - Assess for signs and symptoms of hyperglycemia and hypoglycemia  - Administer ordered medications to maintain glucose within target range  - Assess nutritional intake and initiate nutrition service referral as needed  Outcome: Progressing

## 2021-04-12 VITALS
DIASTOLIC BLOOD PRESSURE: 90 MMHG | OXYGEN SATURATION: 95 % | RESPIRATION RATE: 18 BRPM | HEIGHT: 69 IN | WEIGHT: 240 LBS | TEMPERATURE: 97.6 F | BODY MASS INDEX: 35.55 KG/M2 | HEART RATE: 98 BPM | SYSTOLIC BLOOD PRESSURE: 132 MMHG

## 2021-04-12 PROBLEM — E11.9 NEW ONSET TYPE 2 DIABETES MELLITUS (HCC): Status: ACTIVE | Noted: 2021-04-12

## 2021-04-12 LAB
ALBUMIN SERPL BCP-MCNC: 2.9 G/DL (ref 3.5–5)
ALP SERPL-CCNC: 76 U/L (ref 46–116)
ALT SERPL W P-5'-P-CCNC: 73 U/L (ref 12–78)
ANION GAP SERPL CALCULATED.3IONS-SCNC: 10 MMOL/L (ref 4–13)
AST SERPL W P-5'-P-CCNC: 48 U/L (ref 5–45)
BILIRUB SERPL-MCNC: 0.39 MG/DL (ref 0.2–1)
BUN SERPL-MCNC: 19 MG/DL (ref 5–25)
CALCIUM ALBUM COR SERPL-MCNC: 10.2 MG/DL (ref 8.3–10.1)
CALCIUM SERPL-MCNC: 9.3 MG/DL (ref 8.3–10.1)
CHLORIDE SERPL-SCNC: 100 MMOL/L (ref 100–108)
CO2 SERPL-SCNC: 23 MMOL/L (ref 21–32)
CREAT SERPL-MCNC: 0.83 MG/DL (ref 0.6–1.3)
CRP SERPL QL: 27.1 MG/L
D DIMER PPP FEU-MCNC: 0.68 UG/ML FEU
ERYTHROCYTE [DISTWIDTH] IN BLOOD BY AUTOMATED COUNT: 11.6 % (ref 11.6–15.1)
EST. AVERAGE GLUCOSE BLD GHB EST-MCNC: 269 MG/DL
FERRITIN SERPL-MCNC: 1199 NG/ML (ref 8–388)
GFR SERPL CREATININE-BSD FRML MDRD: 114 ML/MIN/1.73SQ M
GLUCOSE SERPL-MCNC: 361 MG/DL (ref 65–140)
GLUCOSE SERPL-MCNC: 367 MG/DL (ref 65–140)
GLUCOSE SERPL-MCNC: 369 MG/DL (ref 65–140)
GLUCOSE SERPL-MCNC: 456 MG/DL (ref 65–140)
HBA1C MFR BLD: 11 %
HCT VFR BLD AUTO: 44.1 % (ref 36.5–49.3)
HGB BLD-MCNC: 15.1 G/DL (ref 12–17)
MAGNESIUM SERPL-MCNC: 2.1 MG/DL (ref 1.6–2.6)
MCH RBC QN AUTO: 29.3 PG (ref 26.8–34.3)
MCHC RBC AUTO-ENTMCNC: 34.2 G/DL (ref 31.4–37.4)
MCV RBC AUTO: 86 FL (ref 82–98)
NRBC BLD AUTO-RTO: 0 /100 WBCS
PLATELET # BLD AUTO: 289 THOUSANDS/UL (ref 149–390)
PMV BLD AUTO: 9.5 FL (ref 8.9–12.7)
POTASSIUM SERPL-SCNC: 4.9 MMOL/L (ref 3.5–5.3)
PROT SERPL-MCNC: 7.8 G/DL (ref 6.4–8.2)
RBC # BLD AUTO: 5.15 MILLION/UL (ref 3.88–5.62)
SODIUM SERPL-SCNC: 133 MMOL/L (ref 136–145)
WBC # BLD AUTO: 10.36 THOUSAND/UL (ref 4.31–10.16)

## 2021-04-12 PROCEDURE — 82728 ASSAY OF FERRITIN: CPT | Performed by: STUDENT IN AN ORGANIZED HEALTH CARE EDUCATION/TRAINING PROGRAM

## 2021-04-12 PROCEDURE — 85379 FIBRIN DEGRADATION QUANT: CPT | Performed by: STUDENT IN AN ORGANIZED HEALTH CARE EDUCATION/TRAINING PROGRAM

## 2021-04-12 PROCEDURE — 83735 ASSAY OF MAGNESIUM: CPT | Performed by: STUDENT IN AN ORGANIZED HEALTH CARE EDUCATION/TRAINING PROGRAM

## 2021-04-12 PROCEDURE — 80053 COMPREHEN METABOLIC PANEL: CPT | Performed by: STUDENT IN AN ORGANIZED HEALTH CARE EDUCATION/TRAINING PROGRAM

## 2021-04-12 PROCEDURE — 94761 N-INVAS EAR/PLS OXIMETRY MLT: CPT

## 2021-04-12 PROCEDURE — 86140 C-REACTIVE PROTEIN: CPT | Performed by: STUDENT IN AN ORGANIZED HEALTH CARE EDUCATION/TRAINING PROGRAM

## 2021-04-12 PROCEDURE — 85027 COMPLETE CBC AUTOMATED: CPT | Performed by: STUDENT IN AN ORGANIZED HEALTH CARE EDUCATION/TRAINING PROGRAM

## 2021-04-12 PROCEDURE — 83036 HEMOGLOBIN GLYCOSYLATED A1C: CPT | Performed by: STUDENT IN AN ORGANIZED HEALTH CARE EDUCATION/TRAINING PROGRAM

## 2021-04-12 PROCEDURE — 82948 REAGENT STRIP/BLOOD GLUCOSE: CPT

## 2021-04-12 PROCEDURE — 99239 HOSP IP/OBS DSCHRG MGMT >30: CPT | Performed by: STUDENT IN AN ORGANIZED HEALTH CARE EDUCATION/TRAINING PROGRAM

## 2021-04-12 RX ORDER — HYDROCODONE POLISTIREX AND CHLORPHENIRAMINE POLISTIREX 10; 8 MG/5ML; MG/5ML
5 SUSPENSION, EXTENDED RELEASE ORAL EVERY 12 HOURS PRN
Qty: 100 ML | Refills: 0 | Status: SHIPPED | OUTPATIENT
Start: 2021-04-12 | End: 2021-04-22

## 2021-04-12 RX ORDER — FAMOTIDINE 20 MG/1
20 TABLET, FILM COATED ORAL 2 TIMES DAILY
Qty: 12 TABLET | Refills: 0 | Status: SHIPPED | OUTPATIENT
Start: 2021-04-12 | End: 2021-04-18

## 2021-04-12 RX ORDER — DEXAMETHASONE 6 MG/1
6 TABLET ORAL 2 TIMES DAILY WITH MEALS
Qty: 10 TABLET | Refills: 0 | Status: SHIPPED | OUTPATIENT
Start: 2021-04-12 | End: 2021-04-17

## 2021-04-12 RX ORDER — TRAMADOL HYDROCHLORIDE 50 MG/1
50 TABLET ORAL EVERY 6 HOURS PRN
Status: DISCONTINUED | OUTPATIENT
Start: 2021-04-12 | End: 2021-04-12 | Stop reason: HOSPADM

## 2021-04-12 RX ORDER — MELATONIN
2000 DAILY
Qty: 6 TABLET | Refills: 0 | Status: SHIPPED | OUTPATIENT
Start: 2021-04-13 | End: 2021-04-16

## 2021-04-12 RX ADMIN — OXYCODONE HYDROCHLORIDE AND ACETAMINOPHEN 1000 MG: 500 TABLET ORAL at 09:57

## 2021-04-12 RX ADMIN — ACETAMINOPHEN 650 MG: 325 TABLET, FILM COATED ORAL at 09:57

## 2021-04-12 RX ADMIN — FAMOTIDINE 20 MG: 20 TABLET ORAL at 09:58

## 2021-04-12 RX ADMIN — Medication 2000 UNITS: at 09:57

## 2021-04-12 RX ADMIN — ZINC SULFATE 220 MG (50 MG) CAPSULE 220 MG: CAPSULE at 09:57

## 2021-04-12 RX ADMIN — TRAMADOL HYDROCHLORIDE 50 MG: 50 TABLET, FILM COATED ORAL at 13:24

## 2021-04-12 RX ADMIN — INSULIN LISPRO 10 UNITS: 100 INJECTION, SOLUTION INTRAVENOUS; SUBCUTANEOUS at 12:00

## 2021-04-12 RX ADMIN — INSULIN LISPRO 10 UNITS: 100 INJECTION, SOLUTION INTRAVENOUS; SUBCUTANEOUS at 09:58

## 2021-04-12 NOTE — CASE MANAGEMENT
Elías Jalloh from [place] [facility] transport time to be [phone] with [facility]  Patient will have wheelchair transport home no medical necessity required

## 2021-04-12 NOTE — DISCHARGE SUMMARY
[address]  Discharge- Siomara Manzano 1985, 29 y o  male MRN: [id number]  Unit/Bed#: -Mike Encounter: [id number]  Primary Care Provider: No primary care provider on file     Date and time admitted to hospital: 4/8/2021  4:36 PM    * Pneumonia due to COVID-19 virus  Assessment & Plan   Mild Covid 19 pneumonia   Continue decadron, vitamin therapy, remdesivir    Procalcitonin negative, discontinue antibiotics   D dimer<2 5, continue current DVT prophylaxis   Patient feels better, would prefer to continue treatment at home at this time  [facility] o2 evaluation was performed, patient did not qualify for home oxygen   Stable for discharge with remaining course of decadron and vitamin therapy at home   He is requesting a letter for work in which covid 23 was specifically documented, given to patient on discharge     SOB (shortness of breath)  Assessment & Plan  · Likely in the setting of COVID-19 pneumonia  · Plan for principal problem   · symptom management    New onset type 2 diabetes mellitus ([facility] [address] )  Assessment & Plan  Lab Results   Component Value Date    HGBA1C 11 0 (H) 04/12/2021       Recent Labs     04/12/21  0829 04/12/21  1226 04/12/21  1608   POCGLU 367* 369* 361*       Blood Sugar Average: Last 72 hrs:  (P) 796 [id number]   · Due to persistently elevated glucose levels, A1c was ordered, shows new onset diabetes  · Discussed findings with patient who reports he does not have diabetes and "he drank lots of juice the last few days" which resulted in hyperglycemia  · He would prefer not to be discharged on any medication, does not have follow up with PCP so I referred him to family practice and instructed him to set up an appointment  · Discharge home today    Acute respiratory failure with hypoxia ([facility] [address] )  Assessment & Plan  · Secondary to covid 19 pneumonia  · See plan for covid 19 for further management    Electrolyte abnormality  Assessment & Plan  · Likely secondary to dehydration and poor po intake  · Now resolved with hydration and supplementation, monitor daily    Results from last 7 days   Lab Units 04/12/21  0505 04/11/21  0528 04/10/21  0505 04/09/21  0456 04/08/21  1729   SODIUM mmol/L 133* 136 136 136 129*     Results from last 7 days   Lab Units 04/12/21  0505 04/11/21  0528 04/10/21  0505 04/09/21  0456 04/08/21  1729   POTASSIUM mmol/L 4 9 4 7 4 5 4 0 3 2*           Discharging Physician / Practitioner: Edison Isbell MD  PCP: No primary care provider on file  Admission Date:   Admission Orders (From admission, onward)     Ordered        04/08/21 1911  Inpatient Admission  Once                   Discharge Date: 04/12/21    Disposition:      Other: Home    For Discharges to [facility] SN[facility]:   · Not Applicable to this Patient - Not Applicable to this Patient    Reason for Admission: Shortness of breath     Discharge Diagnoses:     Please see assessment and plan section above for further details regarding discharge diagnoses  Consultations During Hospital Stay:  · NA    Procedures Performed:   · Imaging      Medication Adjustments and Discharge Medications:  · Summary of Medication Adjustments made as a result of this hospitalization: see med rec   · Medication Dosing Tapers - Please refer to Discharge Medication List for details on any medication dosing tapers (if applicable to patient)  · Medications being temporarily held (include recommended restart time): see med rec   · Discharge Medication List: See after visit summary for reconciled discharge medications  Wound Care Recommendations:  When applicable, please see wound care section of After Visit Summary      Diet Recommendations at Discharge:  Diet -        Diet Orders   (From admission, onward)             Start     Ordered    04/09/21 0821  Diet Regular; Regular House  Diet effective now     Question Answer Comment   Diet Type Regular    Regular Regular House    Special Instructions Disposable Meal    RD to adjust diet per protocol? Yes        04/09/21 0820                Instructions for any Catheters / Lines Present at Discharge (including removal date, if applicable): na    Significant Findings / Test Results:   Principal Problem:    Pneumonia due to COVID-19 virus  Active Problems:    SOB (shortness of breath)    Electrolyte abnormality    Acute respiratory failure with hypoxia ([facility] [address] )    New onset type 2 diabetes mellitus ([facility] [address] )  Resolved Problems:    * No resolved hospital problems  *      Incidental Findings:   · See above      Test Results Pending at Discharge (will require follow up): · Na      Outpatient Tests Requested:  · Follow up with PCP- referral to family practice in chart     Complications:  See above     Hospital Course:     Gloria Abarca is a 29 y o  male patient who originally presented to the hospital on 4/8/2021 due to shortness of breath, found to have covid 19 and admitted to medicine  Improved with treatment, now stable for discharge  Condition at Discharge: fair     Discharge Day Visit / Exam:     Subjective:  Patient feels well, no chest pain or chest palpitations no shortness of breath  Appetite is good  Vitals: Blood Pressure: 132/90 (04/12/21 0832)  Pulse: 98 (04/12/21 0832)  Temperature: 97 6 °F (36 4 °C) (04/11/21 2144)  Temp Source: Oral (04/11/21 2144)  Respirations: 18 (04/11/21 2144)  Height: 5' 9" (175 3 cm) (04/09/21 0106)  Weight - Scale: 109 kg (240 lb) (04/09/21 0106)  SpO2: 95 % (04/12/21 2875)  Exam:   Physical Exam  Vitals signs and nursing note reviewed  Constitutional:       Appearance: Normal appearance  HENT:      Head: Normocephalic and atraumatic  Nose: Nose normal  No congestion  Mouth/Throat:      Mouth: Mucous membranes are dry  Pharynx: Oropharynx is clear  Eyes:      General:         Right eye: No discharge  Left eye: No discharge  Neck:      Musculoskeletal: Normal range of motion and neck supple  Cardiovascular:      Rate and Rhythm: Normal rate and regular rhythm  Pulmonary:      Effort: Pulmonary effort is normal  No respiratory distress  Abdominal:      General: Abdomen is flat  Palpations: Abdomen is soft  Musculoskeletal:      Right lower leg: No edema  Left lower leg: No edema  Skin:     General: Skin is warm and dry  Neurological:      General: No focal deficit present  Mental Status: He is alert  Mental status is at baseline  Psychiatric:         Mood and Affect: Mood normal          Behavior: Behavior normal          Goals of Care Discussions:  · Code Status at Discharge: Level 1 - Full Code  · Were there any Goals of Care Discussions during Hospitalization?: No  · Results of any General Goals of Care Discussions: na   · POLST Completed: No   · If POLST Completed, Summary of POLST Agreement Provided Here: na   · OK to Rehospitalize if Needed? No    Discharge instructions/Information to patient and family:   See after visit summary section titled Discharge Instructions for information provided to patient and family  Planned Readmission: none      Discharge Statement:  I spent 30+ minutes discharging the patient  This time was spent on the day of discharge  I had direct contact with the patient on the day of discharge  Greater than 50% of the total time was spent examining patient, answering all patient questions, arranging and discussing plan of care with patient as well as directly providing post-discharge instructions  Additional time then spent on discharge activities      ** Please Note: This note has been constructed using a voice recognition system **

## 2021-04-12 NOTE — CASE MANAGEMENT
CM phoned and spoke to patient in his room due to covid precautions  Patient states he lives in his two story house with his wife-Sammi and their four children  Three are under 18 and one is 18  There are two stairs to enter from outside  Patient is independent with adls and does not use dme  Patient does not have rehab and hhc hx  Patient fills scripts with [facility], [facility]  Patient does not have a pcp  Patient declined this cm to schedule a pcp appt for him however he is open to [facility] resource in [place]  CM put [facility] number in [facility]     Patient denies MH/substance abuse hx  Patient names his wife-Sammi as health representative  Patient is employed and drives himself to appointments  Upon clearance for discharge patient states his wife does not drive and he will need transport home  CM to schedule wheelchair transport home  Treatment team informed  CM reviewed discharge planning process including the following: identifying help at home, patient preference for discharge planning needs, pharmacy preference, and availability of treatment team to discuss questions or concerns patient and/or family may have regarding understanding medications and recognizing signs and symptoms once discharged  CM also encouraged patient to follow up with all recommended appointments after discharge  Patient advised of importance for patient and family to participate in managing patients medical well being

## 2021-04-12 NOTE — DISCHARGE INSTRUCTIONS
[address]    Your healthcare provider and/or public health staff have evaluated you and have determined that you do not need to remain in the hospital at this time  At this time you can be isolated at home where you will be monitored by staff from your local or state health department  You should carefully follow the prevention and isolation steps below until a healthcare provider or local or state health department says that you can return to your normal activities  Stay home except to get medical care    People who are mildly ill with COVID-19 are able to isolate at home during their illness  You should restrict activities outside your home, except for getting medical care  Do not go to work, school, or public areas  Avoid using public transportation, ride-sharing, or taxis  Separate yourself from other people and animals in your home    People: As much as possible, you should stay in a specific room and away from other people in your home  Also, you should use a separate bathroom, if available  Animals: You should restrict contact with pets and other animals while you are sick with COVID-19, just like you would around other people  Although there have not been reports of pets or other animals becoming sick with COVID-19, it is still recommended that people sick with COVID-19 limit contact with animals until more information is known about the virus  When possible, have another member of your household care for your animals while you are sick  If you are sick with COVID-19, avoid contact with your pet, including petting, snuggling, being kissed or licked, and sharing food  If you must care for your pet or be around animals while you are sick, wash your hands before and after you interact with pets and wear a facemask  See COVID-19 and Animals for more information      Call ahead before visiting your doctor    If you have a medical appointment, call the healthcare provider and tell them that you have or may have COVID-19  This will help the healthcare providers office take steps to keep other people from getting infected or exposed  Wear a facemask    You should wear a facemask when you are around other people (e g , sharing a room or vehicle) or pets and before you enter a healthcare providers office  If you are not able to wear a facemask (for example, because it causes trouble breathing), then people who live with you should not stay in the same room with you, or they should wear a facemask if they enter your room  Cover your coughs and sneezes    Cover your mouth and nose with a tissue when you cough or sneeze  Throw used tissues in a lined trash can  Immediately wash your hands with soap and water for at least 20 seconds or, if soap and water are not available, clean your hands with an alcohol-based hand  that contains at least 60% alcohol  Clean your hands often    Wash your hands often with soap and water for at least 20 seconds, especially after blowing your nose, coughing, or sneezing; going to the bathroom; and before eating or preparing food  If soap and water are not readily available, use an alcohol-based hand  with at least 60% alcohol, covering all surfaces of your hands and rubbing them together until they feel dry  Soap and water are the best option if hands are visibly dirty  Avoid touching your eyes, nose, and mouth with unwashed hands  Avoid sharing personal household items    You should not share dishes, drinking glasses, cups, eating utensils, towels, or bedding with other people or pets in your home  After using these items, they should be washed thoroughly with soap and water  Clean all high-touch surfaces everyday    High touch surfaces include counters, tabletops, doorknobs, bathroom fixtures, toilets, phones, keyboards, tablets, and bedside tables  Also, clean any surfaces that may have blood, stool, or body fluids on them   Use a household cleaning spray or wipe, according to the label instructions  Labels contain instructions for safe and effective use of the cleaning product including precautions you should take when applying the product, such as wearing gloves and making sure you have good ventilation during use of the product  Monitor your symptoms    Seek prompt medical attention if your illness is worsening (e g , difficulty breathing)  Before seeking care, call your healthcare provider and tell them that you have, or are being evaluated for, COVID-19  Put on a facemask before you enter the facility  These steps will help the healthcare providers office to keep other people in the office or waiting room from getting infected or exposed  Ask your healthcare provider to call the local or [facility] health department  Persons who are placed under active monitoring or facilitated self-monitoring should follow instructions provided by their local health department or occupational health professionals, as appropriate  If you have a medical emergency and need to call 911, notify the dispatch personnel that you have, or are being evaluated for COVID-19  If possible, put on a facemask before emergency medical services arrive      Discontinuing home isolation    Patients with confirmed COVID-19 should remain under home isolation precautions until the following conditions are met:   - They have had no fever for at least 24 hours (that is one full day of no fever without the use medicine that reduces fevers)  AND  - other symptoms have improved (for example, when their cough or shortness of breath have improved)  AND  - If had mild or moderate illness, at least 10 days have passed since their symptoms first appeared or if severe illness (needed oxygen) or immunosuppressed, at least 20 days have passed since symptoms first appeared  Patients with confirmed COVID-19 should also notify close contacts (including their workplace) and ask that they self-quarantine  Currently, close contact is defined as being within 6 feet for 15 minutes or more from the period 24 hours starting 48 hours before symptom onset to the time at which the patient went into isolation  Close contacts of patients diagnosed with COVID-19 should be instructed by the patient to self-quarantine for 14 days from the last time of their last contact with the patient  Source: [employer]                    Ascorbic Acid (Vitamin C) (By mouth)   Ascorbic Acid (as-KORE-heidyk AS-id)  Helps patients who do not have enough vitamin C in the body  It is sometimes used to add acidity to the urine  Brand Name(s): C-Time w/Vandana Hips, Bladimir-C, One-Gram C, Sunkist Vitamin C, Sunmark Chewable C, Vitamin C with Acerola   There may be other brand names for this medicine  When This Medicine Should Not Be Used: You should not use this medicine if you have had an allergic reaction to ascorbic acid (vitamin C)  How to Use This Medicine:   Tablet, Powder, Chewable Tablet, Long Acting Tablet, Long Acting Capsule, Liquid  · Your doctor will tell you how much to take and how often  Always follow the dose instructions on the label if you take this medicine in over-the-counter form  · May be taken with or without food  · Swallow the tablet or capsule whole, do not chew or crush  · Chew the chewable tablet and swallow  · Carefully measure the oral liquid using a marked measuring spoon or medicine cup, or with the dropper that comes with the medicine  This medicine may either be mixed with foods such as cereal and fruit juice, or be dropped into the mouth  If a dose is missed:   · Try not to miss any doses; however, it is usually not a problem if you miss one or two doses  How to Store and Dispose of This Medicine:   · Store at room temperature in a closed container away from heat, moisture, and light  Do not freeze    · Keep all medicine away from children  Drugs and Foods to Avoid:   Ask your doctor or pharmacist before using any other medicine, including over-the-counter medicines, vitamins, and herbal products  · Make sure your doctor knows if you are taking a blood thinner, such as Coumadin®  Warnings While Using This Medicine:   · Check with your doctor before taking vitamin C if you are pregnant, breastfeeding, or have any medical problems  · Ascorbic acid may interfere with tests that measure sugar in the urine of patients with diabetes  Possible Side Effects While Using This Medicine: If you notice these less serious side effects, talk with your doctor:  · Nausea and vomiting, stomach pain, diarrhea  If you notice other side effects that you think are caused by this medicine, tell your doctor  Call your doctor for medical advice about side effects  You may report side effects to FDA at 0-592-FDA-2113  © [address] Information is for End User's use only and may not be sold, redistributed or otherwise used for commercial purposes  The above information is an  only  It is not intended as medical advice for individual conditions or treatments  Talk to your doctor, nurse or pharmacist before following any medical regimen to see if it is safe and effective for you  Cholecalciferol (By mouth)   Cholecalciferol (pdx-ty-rgd-SIF-er-ol)  Treats vitamin D deficiency and maintains bone strength  This is a dietary supplement  Brand Name(s): Basic's Natural Vitamin D-3, Darren Fischer Ddrops, Jozef Force, [place] [employer], D-3, D3-5, D3-50, Decara, Delta D3, Dialyvite Vitamin D3 Max, Ricco Natural , Maximum D3, Nature's Blend Super Strength Vitamin D3, Thera-D 2000, Thera-D 4000   There may be other brand names for this medicine  When This Medicine Should Not Be Used: This medicine is generally considered safe for most people  Talk to your doctor if you have concerns    How to Use This Medicine: Capsule, Liquid Filled Capsule, Liquid, Tablet, Chewable Tablet, Wafer, Drop  · Your doctor will tell you how much medicine to use  Do not use more than directed  · Follow the instructions on the medicine label if you are using this medicine without a prescription  · Oral liquid: Use the dropper that comes with the package to measure the dose  ? Adults and adolescents: Drop the liquid directly into the mouth or mix it with food or other liquids (water or juice)  ? Children 3years of age and older: Drop the liquid directly into the mouth, mix it with food or other liquids (water or juice), or take it from a spoon  ? Children younger than 3years of age: Place one drop of the liquid on the pacifier, mother's nipple, or bottle nipple and allow baby to suck for at least 30 seconds  · Wafer: Chew or crush  Do not swallow whole  · Missed dose: Take a dose as soon as you remember  If it is almost time for your next dose, wait until then and take a regular dose  Do not take extra medicine to make up for a missed dose  · Store the medicine in a closed container at room temperature, away from heat, moisture, and direct light  Drugs and Foods to Avoid:      Ask your doctor or pharmacist before using any other medicine, including over-the-counter medicines, vitamins, and herbal products  Warnings While Using This Medicine:   · Tell your doctor if you are pregnant or breastfeeding  · Your doctor will check your progress and the effects of this medicine at regular visits  Keep all appointments  · Keep all medicine out of the reach of children  Never share your medicine with anyone    Possible Side Effects While Using This Medicine:   Call your doctor right away if you notice any of these side effects:  · Allergic reaction: Itching or hives, swelling in your face or hands, swelling or tingling in your mouth or throat, chest tightness, trouble breathing  If you notice other side effects that you think are caused by this medicine, tell your doctor  Call your doctor for medical advice about side effects  You may report side effects to FDA at 3-342-LBL-7717  © Copyright [address] [place] [address] Drive Information is for End User's use only and may not be sold, redistributed or otherwise used for commercial purposes  The above information is an  only  It is not intended as medical advice for individual conditions or treatments  Talk to your doctor, nurse or pharmacist before following any medical regimen to see if it is safe and effective for you  Dexamethasone (By mouth)   Dexamethasone (dex-a-METH-a-sone)  Treats inflammation, severe allergies, flare-ups of ongoing illnesses, and many other medical problems  May also be used to decrease some symptoms of cancer, including multiple myeloma  This medicine is a corticosteroid  Brand Name(s): Decadron, DexPak, DexPak 10 Day TaperPak, DexPak 13 Day TaperPak, DexPak 6 Day TaperPak, Dexabliss 11-Day Dose Pack, Dxevo, Hemady, HiDex, TaperDex, TaperDex 7-Day, ZCORT 7-Day   There may be other brand names for this medicine  When This Medicine Should Not Be Used: This medicine is not right for everyone  Do not use it if you had an allergic reaction to dexamethasone, or if you have a fungal infection  How to Use This Medicine:   Liquid, Tablet  · Take your medicine as directed  Your dose may need to be changed several times to find what works best for you  If you are using this medicine for an ongoing illness, your dose may need to be changed occasionally  · It is best to take this medicine with food or milk  · Hemady: May be taken with or without food  · Oral liquid: Measure the oral liquid medicine with a marked measuring spoon, oral syringe, or medicine cup  · Missed dose:   ? If your schedule is one dose every other day and you cannot use the missed dose until late in the day, wait until the next morning to use your medicine   Then skip a day and go back to your regular schedule  ? If your schedule is one dose every day, use the missed dose as soon as you can  Then go back to your regular schedule  ? If your schedule is more than one dose every day, use the missed dose as soon as you can  If it is almost time for your next dose, use two doses at that time  Then go back to your regular schedule  · Store the medicine in a closed container at room temperature, away from heat, moisture, and direct light  Drugs and Foods to Avoid:   Ask your doctor or pharmacist before using any other medicine, including over-the-counter medicines, vitamins, and herbal products  · Some medicines can affect how dexamethasone works  Tell your doctor if you are using any of the following:  ? Aminoglutethimide, aprepitant, carbamazepine, carfilzomib, cholestyramine, cobicistat, cyclosporine, digitalis, digoxin, ephedrine, indinavir, indomethacin, lenalidomide, pancuronium, phenobarbital, phenytoin, pomalidomide, ritonavir, thalidomide  ? Birth control pills (including estrogen)  ? Blood thinner (including warfarin)  ? Diuretic (water pill)  ? Insulin and oral diabetes medicine  ? Medicine to treat an infection (including amphotericin B, azithromycin, clarithromycin, erythromycin, isoniazid, itraconazole, ketoconazole, rifampin)  ? NSAID pain or arthritis medicine (including aspirin, celecoxib, diclofenac, naproxen)  ? Potassium supplement  · This medicine may interfere with vaccines  Ask your doctor before you get a flu shot or any other vaccines  Warnings While Using This Medicine:   · Using [facility] while you are pregnant can harm your unborn baby  Use an effective form of birth control to keep from getting pregnant during treatment and for 1 month after your last dose  If you think you have become pregnant while using the medicine, tell your doctor right away  · Do not breastfeed during treatment with Hemady and for 2 weeks after your last dose    · Tell your doctor if you are pregnant or breastfeeding, or if you have kidney disease, liver disease (including cirrhosis), adrenal gland problems, blood clots, diabetes, eye or vision problems, heart failure, high blood pressure, myasthenia gravis, osteoporosis, stomach or bowel problems (including ulcer, diverticulitis), thyroid problems, depression, mental health problems, or a recent heart attack  · This medicine may cause the following problems:  ? High blood pressure  ? Eye or vision problems (including cataracts or glaucoma), with long-term use  ? Increased risk for cancer (including Kaposi's sarcoma)  ? Adrenal gland problems  ? Stomach or bowel perforation (tear or hole)  ? Bone problems (including osteoporosis)  ? Muscle problems  ? Slow growth in children  ? Changes in mood or behavior  · This medicine could cause you to get infections more easily  Tell your doctor right away if you have any type of infection (including herpes eye infection, tuberculosis, or threadworm) or a recent exposure to chickenpox or measles  Tell your doctor if you have an infection that keeps coming back  · If you use this medicine for a long time, tell your doctor about any extra stress or anxiety in your life, including other health concerns and emotional stress  Your dose might need to be changed for a short time while you have extra stress  · Talk with your doctor before using this medicine if you plan to have children  Some men who is taking Hemady have become infertile (unable to have children)  · Do not stop using this medicine suddenly  Your doctor will need to slowly decrease your dose before you stop it completely  · Tell any doctor or dentist who treats you that you are using this medicine  This medicine may affect certain medical test results  · Your doctor will do lab tests at regular visits to check on the effects of this medicine  Keep all appointments  · Keep all medicine out of the reach of children   Never share your medicine with anyone  Possible Side Effects While Using This Medicine:   Call your doctor right away if you notice any of these side effects:  · Allergic reaction: Itching or hives, swelling in your face or hands, swelling or tingling in your mouth or throat, chest tightness, trouble breathing  · Blurred vision or other changes in vision, trouble seeing, eye pain  · Bone pain, decrease in height  · Chest pain, trouble breathing, coughing up blood  · Dark freckles, skin color changes, coldness, weakness, tiredness, weight loss  · Depression, unusual thoughts, feelings, or behaviors, trouble sleeping  · Fever, chills, cough, sore throat, body aches  · Rapid weight gain, swelling in your hands, ankles, or feet  · Severe stomach pain, nausea, vomiting, or red or black stools  · Skin changes or growths  · Unexplained muscle pain, tenderness, or weakness  · Unusual bleeding or bruising  If you notice these less serious side effects, talk with your doctor:   · Round, puffy face  · Weight gain around your neck, upper back, breast, face, or waist  If you notice other side effects that you think are caused by this medicine, tell your doctor  Call your doctor for medical advice about side effects  You may report side effects to FDA at 7-684-FDA-8819  © Copyright [facility] [address] Information is for End User's use only and may not be sold, redistributed or otherwise used for commercial purposes  The above information is an  only  It is not intended as medical advice for individual conditions or treatments  Talk to your doctor, nurse or pharmacist before following any medical regimen to see if it is safe and effective for you  Famotidine (By mouth)   Famotidine (pxb-KP-vi-quang)  Treats ulcers, gastroesophageal reflux disease (GERD), and conditions that cause the stomach to produce too much acid  Also treats heartburn caused by acid indigestion     Brand Name(s): Acid Controller, Acid Reducer, [facility] Acid Reducer, Good Sense Acid Reducer, Heartburn Relief, Leader Acid Reducer, Pepcid, Pepcid AC, Quality Choice Acid Controller, Sunmark Acid Reducer, TopCare Acid Reducer   There may be other brand names for this medicine  When This Medicine Should Not Be Used: This medicine is not right for everyone  Do not use it if you had an allergic reaction to famotidine or similar medicines  How to Use This Medicine:   Liquid, Tablet, Chewable Tablet, Dissolving Tablet  · Take your medicine as directed  Your dose may need to be changed several times to find what works best for you  · Follow the instructions on the medicine label if you are using this medicine without a prescription  · The chewable tablet must be chewed completely before you swallow it  · If you are using the disintegrating tablet, make sure your hands are dry before you handle it  Do not open the blister pack that contains the tablet until you are ready to take it  Remove the tablet from the blister pack by peeling back the foil, then taking the tablet out  Do not push the tablet through the foil  Place the tablet in your mouth  It should melt within 2 minutes  Swallow after the tablet has melted  · Shake the oral liquid for 5 to 10 seconds before each use  Measure the medicine with a marked measuring spoon or medicine cup  · Missed dose: Take a dose as soon as you remember  If it is almost time for your next dose, wait until then and take a regular dose  Do not take extra medicine to make up for a missed dose  · Store the medicine in a closed container at room temperature, away from heat, moisture, and direct light  Do not freeze the oral liquid  Throw away any unused oral liquid after 1 month  Drugs and Foods to Avoid:   Ask your doctor or pharmacist before using any other medicine, including over-the-counter medicines, vitamins, and herbal products  · Some medicines can affect how famotidine works   Tell your doctor if you are using cefditoren, dasatinib, delavirdine, fosamprenavir, or tizanidine  Warnings While Using This Medicine:   · Tell your doctor if you are pregnant or breastfeeding, or if you have kidney disease, liver disease, or stomach cancer  · This medicine may cause changes in your heart rhythm (including QT prolongation)  · Some brands or forms of this medicine may contain phenylalanine (aspartame)  If you have phenylketonuria (PKU), talk to your doctor before using this medicine  · Call your doctor if your symptoms do not improve or if they get worse  · Your doctor will do lab tests at regular visits to check on the effects of this medicine  Keep all appointments  · Keep all medicine out of the reach of children  Never share your medicine with anyone  Possible Side Effects While Using This Medicine:   Call your doctor right away if you notice any of these side effects:  · Allergic reaction: Itching or hives, swelling in your face or hands, swelling or tingling in your mouth or throat, chest tightness, trouble breathing  · Agitation, confusion, seizures  · Blistering, peeling, red skin rash  · Dark urine or pale stools, yellow eyes or skin  · Fainting, dizziness, lightheadedness  · Fast, pounding, or uneven heartbeat  · Fever, chills, cough, sore throat, body aches  · Unusual bleeding, bruising, or weakness  If you notice these less serious side effects, talk with your doctor:   · Constipation, diarrhea, upset stomach  · Headache  · Nausea, vomiting  If you notice other side effects that you think are caused by this medicine, tell your doctor  Call your doctor for medical advice about side effects  You may report side effects to FDA at 5-453-FDA-1124  © Copyright [address] [place] [address] Drive Information is for End User's use only and may not be sold, redistributed or otherwise used for commercial purposes  The above information is an  only  It is not intended as medical advice for individual conditions or treatments   Talk to your doctor, nurse or pharmacist before following any medical regimen to see if it is safe and effective for you  Hydrocodone/Chlorpheniramine (By mouth)   Chlorpheniramine (klor-fen-IR-a-meen), Hydrocodone (kac-fdff-ONT-done)  Treats cough and other symptoms of a cold or allergies  This medicine contains a narcotic  Brand Name(s): TussiCaps, Tussionex Pennkinetic   There may be other brand names for this medicine  When This Medicine Should Not Be Used: This medicine is not right for everyone  Do not use it if you had an allergic reaction to hydrocodone or chlorpheniramine, or if you have severe lung or breathing problems, or stomach or bowel blockage (including paralytic ileus)  How to Use This Medicine:   Long Acting Capsule, Liquid  · Your doctor will tell you how much medicine to use  Do not use more than directed  · An overdose can be dangerous  Follow directions carefully so you do not get too much medicine at one time  · Do not mix this medicine with other liquids or other medicines  · Oral liquid: Measure the oral liquid medicine with a marked measuring spoon, oral syringe, or medicine cup  Shake the medicine well before you use it  Rinse the measuring device or dosing spoon after each use  · Swallow the extended-release capsule whole  Do not crush, break, or chew it  · Drink plenty of liquids to help avoid constipation  · This medicine should come with a Medication Guide  Ask your pharmacist for a copy if you do not have one  · Missed dose: Take a dose as soon as you remember  If it is almost time for your next dose, wait until then and take a regular dose  Do not take extra medicine to make up for a missed dose  · Store the medicine in a closed container at room temperature, away from heat, moisture, and direct light  Drop off any unused narcotic medicine at a drug take-back location right away   If you do not have a drug take-back location near you, flush any unused narcotic medicine down the toilet  Check your local drug store and clinics for take-back locations  You can also check the [place] web site for locations  Here is the link to the FDA safe disposal of medicines website: www fda gov/drugs/resourcesforyou/consumers/buyingusingmedicinesafely/ensuringsafeuseofmedicine/safedisposalofmedicines/odh372802 htm  Drugs and Foods to Avoid:   Ask your doctor or pharmacist before using any other medicine, including over-the-counter medicines, vitamins, and herbal products  · Do not use this medicine if you are using or have used an MAO inhibitor within the past 14 days  · Some medicines can affect how hydrocodone/chlorpheniramine works  Tell your doctor if you are using any of the following:  ? Carbamazepine, digitalis, erythromycin, ketoconazole, phenytoin, rifampin, ritonavir  ? Diuretic (water pill)  ? Medicine to treat anxiety, depression, or mental health problems (including benzodiazepine medicine)  · Do not drink alcohol while you are using this medicine  · Tell your doctor if you use anything else that makes you sleepy  Some examples are allergy medicine, narcotic pain medicine, and alcohol  Tell your doctor if you are also using butorphanol, nalbuphine, pentazocine, or a muscle relaxer  Warnings While Using This Medicine:   · Tell your doctor if you are pregnant or breastfeeding, or if you have kidney disease, liver disease, [place] disease, an enlarged prostate or trouble urinating, glaucoma, heart disease, high blood pressure, asthma, pancreas problems, stomach or bowel problems, or thyroid problems  Tell your doctor if you have a history of head injury, seizures, or drug or alcohol addiction  · This medicine may cause the following problems:  ? Respiratory depression (severe breathing problem)  ? Increased risk of overdose, which can lead to death  ? Increased risk of seizures  ? Low blood pressure  ? Adrenal gland problems  · This medicine may make you dizzy or drowsy   Do not drive or do anything else that could be dangerous until you know how this medicine affects you  · This medicine can be habit-forming  Do not use more than your prescribed dose  Call your doctor if you think your medicine is not working  · This medicine may cause constipation, especially with long-term use  Ask your doctor if you should use a laxative to prevent and treat constipation  · Do not stop using this medicine suddenly  Your doctor will need to slowly decrease your dose before you stop it completely  · This medicine could cause infertility  Talk with your doctor before using this medicine if you plan to have children  · Tell any doctor or dentist who treats you that you are using this medicine  This medicine may affect certain medical test results  · Your doctor will check your progress and the effects of this medicine at regular visits  Keep all appointments  Call your doctor if your cough does not improve or if it gets worse within 5 days  · Keep all medicine out of the reach of children  Never share your medicine with anyone  Possible Side Effects While Using This Medicine:   Call your doctor right away if you notice any of these side effects:  · Allergic reaction: Itching or hives, swelling in your face or hands, swelling or tingling in your mouth or throat, chest tightness, trouble breathing  · Blue lips, fingernails, or skin, trouble breathing  · Changes in skin color, dark freckles  · Extreme dizziness or weakness, shallow breathing, slow heartbeat, seizures, cold, clammy skin  · Lightheadedness, dizziness, fainting  · Severe constipation or stomach pain  If you notice these less serious side effects, talk with your doctor:   · Drowsiness  · Dry mouth or throat  · Mild constipation, nausea, or vomiting  If you notice other side effects that you think are caused by this medicine, tell your doctor  Call your doctor for medical advice about side effects   You may report side effects to FDA at 1-800-FDA-1088  © Copyright [address] [place] [address] Information is for End User's use only and may not be sold, redistributed or otherwise used for commercial purposes  The above information is an  only  It is not intended as medical advice for individual conditions or treatments  Talk to your doctor, nurse or pharmacist before following any medical regimen to see if it is safe and effective for you

## 2021-04-12 NOTE — PLAN OF CARE
Problem: PAIN - ADULT  Goal: Verbalizes/displays adequate comfort level or baseline comfort level  Description: Interventions:  - Encourage patient to monitor pain and request assistance  - Assess pain using appropriate pain scale  - Administer analgesics based on type and severity of pain and evaluate response  - Implement non-pharmacological measures as appropriate and evaluate response  - Consider cultural and social influences on pain and pain management  - Notify physician/advanced practitioner if interventions unsuccessful or patient reports new pain  Outcome: Progressing     Problem: INFECTION - ADULT  Goal: Absence or prevention of progression during hospitalization  Description: INTERVENTIONS:  - Assess and monitor for signs and symptoms of infection  - Monitor lab/diagnostic results  - Monitor all insertion sites, i e  indwelling lines, tubes, and drains  - Monitor endotracheal if appropriate and nasal secretions for changes in amount and color  - [place] appropriate cooling/warming therapies per order  - Administer medications as ordered  - Instruct and encourage patient and family to use good hand hygiene technique  - Identify and instruct in appropriate isolation precautions for identified infection/condition  Outcome: Progressing  Goal: Absence of fever/infection during neutropenic period  Description: INTERVENTIONS:  - Monitor WBC    Outcome: Progressing     Problem: SAFETY ADULT  Goal: Patient will remain free of falls  Description: INTERVENTIONS:  - Assess patient frequently for physical needs  -  Identify cognitive and physical deficits and behaviors that affect risk of falls    -  [place] fall precautions as indicated by assessment   - Educate patient/family on patient safety including physical limitations  - Instruct patient to call for assistance with activity based on assessment  - Modify environment to reduce risk of injury  - Consider OT/PT consult to assist with strengthening/mobility  Outcome: Progressing  Goal: Maintain or return to baseline ADL function  Description: INTERVENTIONS:  -  Assess patient's ability to carry out ADLs; assess patient's baseline for ADL function and identify physical deficits which impact ability to perform ADLs (bathing, care of mouth/teeth, toileting, grooming, dressing, etc )  - Assess/evaluate cause of self-care deficits   - Assess range of motion  - Assess patient's mobility; develop plan if impaired  - Assess patient's need for assistive devices and provide as appropriate  - Encourage maximum independence but intervene and supervise when necessary  - Involve family in performance of ADLs  - Assess for home care needs following discharge   - Consider OT consult to assist with ADL evaluation and planning for discharge  - Provide patient education as appropriate  Outcome: Progressing  Goal: Maintain or return mobility status to optimal level  Description: INTERVENTIONS:  - Assess patient's baseline mobility status (ambulation, transfers, stairs, etc )    - Identify cognitive and physical deficits and behaviors that affect mobility  - Identify mobility aids required to assist with transfers and/or ambulation (gait belt, sit-to-stand, lift, walker, cane, etc )  - [place] fall precautions as indicated by assessment  - Record patient progress and toleration of activity level on Mobility SBAR; progress patient to next Phase/Stage  - Instruct patient to call for assistance with activity based on assessment  - Consider rehabilitation consult to assist with strengthening/weightbearing, etc   Outcome: Progressing     Problem: DISCHARGE PLANNING  Goal: Discharge to home or other facility with appropriate resources  Description: INTERVENTIONS:  - Identify barriers to discharge w/patient and caregiver  - Arrange for needed discharge resources and transportation as appropriate  - Identify discharge learning needs (meds, wound care, etc )  - Arrange for interpretive services to assist at discharge as needed  - Refer to Case Management Department for coordinating discharge planning if the patient needs post-hospital services based on physician/advanced practitioner order or complex needs related to functional status, cognitive ability, or social support system  Outcome: Progressing     Problem: Knowledge Deficit  Goal: Patient/family/caregiver demonstrates understanding of disease process, treatment plan, medications, and discharge instructions  Description: Complete learning assessment and assess knowledge base    Interventions:  - Provide teaching at level of understanding  - Provide teaching via preferred learning methods  Outcome: Progressing     Problem: RESPIRATORY - ADULT  Goal: Achieves optimal ventilation and oxygenation  Description: INTERVENTIONS:  - Assess for changes in respiratory status  - Assess for changes in mentation and behavior  - Position to facilitate oxygenation and minimize respiratory effort  - Oxygen administered by appropriate delivery if ordered  - Initiate smoking cessation education as indicated  - Encourage broncho-pulmonary hygiene including cough, deep breathe, Incentive Spirometry  - Assess the need for suctioning and aspirate as needed  - Assess and instruct to report SOB or any respiratory difficulty  - Respiratory Therapy support as indicated  Outcome: Progressing     Problem: GASTROINTESTINAL - ADULT  Goal: Minimal or absence of nausea and/or vomiting  Description: INTERVENTIONS:  - Administer IV fluids if ordered to ensure adequate hydration  - Maintain NPO status until nausea and vomiting are resolved  - Nasogastric tube if ordered  - Administer ordered antiemetic medications as needed  - Provide nonpharmacologic comfort measures as appropriate  - Advance diet as tolerated, if ordered  - Consider nutrition services referral to assist patient with adequate nutrition and appropriate food choices  Outcome: Progressing  Goal: Maintains or returns to baseline bowel function  Description: INTERVENTIONS:  - Assess bowel function  - Encourage oral fluids to ensure adequate hydration  - Administer IV fluids if ordered to ensure adequate hydration  - Administer ordered medications as needed  - Encourage mobilization and activity  - Consider nutritional services referral to assist patient with adequate nutrition and appropriate food choices  Outcome: Progressing  Goal: Maintains adequate nutritional intake  Description: INTERVENTIONS:  - Monitor percentage of each meal consumed  - Identify factors contributing to decreased intake, treat as appropriate  - Assist with meals as needed  - Monitor I&O, weight, and lab values if indicated  - Obtain nutrition services referral as needed  Outcome: Progressing  Goal: Establish and maintain optimal ostomy function  Description: INTERVENTIONS:  - Assess bowel function  - Encourage oral fluids to ensure adequate hydration  - Administer IV fluids if ordered to ensure adequate hydration   - Administer ordered medications as needed  - Encourage mobilization and activity  - Nutrition services referral to assist patient with appropriate food choices  - Assess stoma site  - Consider wound care consult   Outcome: Progressing     Problem: METABOLIC, FLUID AND ELECTROLYTES - ADULT  Goal: Electrolytes maintained within normal limits  Description: INTERVENTIONS:  - Monitor labs and assess patient for signs and symptoms of electrolyte imbalances  - Administer electrolyte replacement as ordered  - Monitor response to electrolyte replacements, including repeat lab results as appropriate  - Instruct patient on fluid and nutrition as appropriate  Outcome: Progressing  Goal: Fluid balance maintained  Description: INTERVENTIONS:  - Monitor labs   - Monitor I/O and WT  - Instruct patient on fluid and nutrition as appropriate  - Assess for signs & symptoms of volume excess or deficit  Outcome: Progressing  Goal: Glucose maintained within target range  Description: INTERVENTIONS:  - Monitor Blood Glucose as ordered  - Assess for signs and symptoms of hyperglycemia and hypoglycemia  - Administer ordered medications to maintain glucose within target range  - Assess nutritional intake and initiate nutrition service referral as needed  Outcome: Progressing     Problem: Potential for Falls  Goal: Patient will remain free of falls  Description: INTERVENTIONS:  - Assess patient frequently for physical needs  -  Identify cognitive and physical deficits and behaviors that affect risk of falls    -  [place] fall precautions as indicated by assessment   - Educate patient/family on patient safety including physical limitations  - Instruct patient to call for assistance with activity based on assessment  - Modify environment to reduce risk of injury  - Consider OT/PT consult to assist with strengthening/mobility  Outcome: Progressing

## 2021-04-12 NOTE — CASE MANAGEMENT
Per [facility] patient is cleared for discharge  CM phoned and spoke to patient who states he will need transport home and he will need a letter from [facility] specifically stating the reason for hospital admission is due to [place]  Patient states he works in [place] and his employer is requesting the letter to state COVID if that is the dx  [facility] provided patient with requested letter  CM phoned and spoke to brandi at [facility] who will call back with wheelchair transport time for patient to go home  Patient does not qualify for home oxygen  Treatment team informed

## 2021-04-12 NOTE — RESPIRATORY THERAPY NOTE
Home Oxygen Qualifying Test       Patient name: Neha Martinez        : 1985   Date of Test:  2021  Diagnosis: covid 19     Home Oxygen Test:    **Medicare Guidelines require item(s) 1-5 on all ambulatory patients or 1 and 2 on non-ambulatory patients  1   Baseline SPO2 on Room Air at rest 94 %  2   SPO2 during exercise on Room Air 93 %  During exercise monitor SpO2  If SPO2 increases >=89% with ambulation do not add supplemental             oxygen  If <= 88% on room air add O2 via NC and titrate patient  Patient must be ambulated with O2 and titrated to > 88% with exertion  3   SPO2 on Oxygen at rest  Johnson Peter Johnson Peter NA  4  SPO2 during exercise on Oxygen    na  5  Exercise performed:          walking, duration 5 (min)          []  Supplemental Home Oxygen is indicated  [x]  Client does not qualify for home oxygen  Respiratory Additional Notes- Patient assessed for home oxygen needs at this time  Patient maintains an spo2 of 93% or better for duration of walk  Home oxygen is not indicated at this time       Darrell Morrow, RT

## 2021-04-12 NOTE — PLAN OF CARE
Problem: PAIN - ADULT  Goal: Verbalizes/displays adequate comfort level or baseline comfort level  Description: Interventions:  - Encourage patient to monitor pain and request assistance  - Assess pain using appropriate pain scale  - Administer analgesics based on type and severity of pain and evaluate response  - Implement non-pharmacological measures as appropriate and evaluate response  - Consider cultural and social influences on pain and pain management  - Notify physician/advanced practitioner if interventions unsuccessful or patient reports new pain  4/12/2021 1504 by Shaun Santana RN  Outcome: Adequate for Discharge  4/12/2021 0954 by Shaun Santana RN  Outcome: Progressing     Problem: INFECTION - ADULT  Goal: Absence or prevention of progression during hospitalization  Description: INTERVENTIONS:  - Assess and monitor for signs and symptoms of infection  - Monitor lab/diagnostic results  - Monitor all insertion sites, i e  indwelling lines, tubes, and drains  - Monitor endotracheal if appropriate and nasal secretions for changes in amount and color  - [place] appropriate cooling/warming therapies per order  - Administer medications as ordered  - Instruct and encourage patient and family to use good hand hygiene technique  - Identify and instruct in appropriate isolation precautions for identified infection/condition  4/12/2021 1504 by Shaun Santana RN  Outcome: Adequate for Discharge  4/12/2021 0954 by Shaun Santana RN  Outcome: Progressing  Goal: Absence of fever/infection during neutropenic period  Description: INTERVENTIONS:  - Monitor WBC    4/12/2021 1504 by Shaun Santana RN  Outcome: Adequate for Discharge  4/12/2021 0954 by Shaun Santana RN  Outcome: Progressing     Problem: SAFETY ADULT  Goal: Patient will remain free of falls  Description: INTERVENTIONS:  - Assess patient frequently for physical needs  -  Identify cognitive and physical deficits and behaviors that affect risk of falls    -  [place] fall precautions as indicated by assessment   - Educate patient/family on patient safety including physical limitations  - Instruct patient to call for assistance with activity based on assessment  - Modify environment to reduce risk of injury  - Consider OT/PT consult to assist with strengthening/mobility  4/12/2021 1504 by Manjeet Whitt RN  Outcome: Adequate for Discharge  4/12/2021 0954 by Manjeet Whitt RN  Outcome: Progressing  Goal: Maintain or return to baseline ADL function  Description: INTERVENTIONS:  -  Assess patient's ability to carry out ADLs; assess patient's baseline for ADL function and identify physical deficits which impact ability to perform ADLs (bathing, care of mouth/teeth, toileting, grooming, dressing, etc )  - Assess/evaluate cause of self-care deficits   - Assess range of motion  - Assess patient's mobility; develop plan if impaired  - Assess patient's need for assistive devices and provide as appropriate  - Encourage maximum independence but intervene and supervise when necessary  - Involve family in performance of ADLs  - Assess for home care needs following discharge   - Consider OT consult to assist with ADL evaluation and planning for discharge  - Provide patient education as appropriate  4/12/2021 1504 by Manjeet Whitt RN  Outcome: Adequate for Discharge  4/12/2021 0954 by Manjeet Whitt RN  Outcome: Progressing  Goal: Maintain or return mobility status to optimal level  Description: INTERVENTIONS:  - Assess patient's baseline mobility status (ambulation, transfers, stairs, etc )    - Identify cognitive and physical deficits and behaviors that affect mobility  - Identify mobility aids required to assist with transfers and/or ambulation (gait belt, sit-to-stand, lift, walker, cane, etc )  - [place] fall precautions as indicated by assessment  - Record patient progress and toleration of activity level on Mobility SBAR; progress patient to next Phase/Stage  - Instruct patient to call for assistance with activity based on assessment  - Consider rehabilitation consult to assist with strengthening/weightbearing, etc   4/12/2021 1504 by Amaya Dow RN  Outcome: Adequate for Discharge  4/12/2021 0954 by Amaya Dow RN  Outcome: Progressing     Problem: DISCHARGE PLANNING  Goal: Discharge to home or other facility with appropriate resources  Description: INTERVENTIONS:  - Identify barriers to discharge w/patient and caregiver  - Arrange for needed discharge resources and transportation as appropriate  - Identify discharge learning needs (meds, wound care, etc )  - Arrange for interpretive services to assist at discharge as needed  - Refer to Case Management Department for coordinating discharge planning if the patient needs post-hospital services based on physician/advanced practitioner order or complex needs related to functional status, cognitive ability, or social support system  4/12/2021 1504 by Amaya Dow RN  Outcome: Adequate for Discharge  4/12/2021 0954 by Amaya Dow RN  Outcome: Progressing     Problem: Knowledge Deficit  Goal: Patient/family/caregiver demonstrates understanding of disease process, treatment plan, medications, and discharge instructions  Description: Complete learning assessment and assess knowledge base    Interventions:  - Provide teaching at level of understanding  - Provide teaching via preferred learning methods  4/12/2021 1504 by Amaya Dow RN  Outcome: Adequate for Discharge  4/12/2021 0954 by Amaya Dow RN  Outcome: Progressing     Problem: RESPIRATORY - ADULT  Goal: Achieves optimal ventilation and oxygenation  Description: INTERVENTIONS:  - Assess for changes in respiratory status  - Assess for changes in mentation and behavior  - Position to facilitate oxygenation and minimize respiratory effort  - Oxygen administered by appropriate delivery if ordered  - Initiate smoking cessation education as indicated  - Encourage broncho-pulmonary hygiene including cough, deep breathe, Incentive Spirometry  - Assess the need for suctioning and aspirate as needed  - Assess and instruct to report SOB or any respiratory difficulty  - Respiratory Therapy support as indicated  4/12/2021 1504 by Justo Cormier RN  Outcome: Adequate for Discharge  4/12/2021 0954 by Justo Cormier RN  Outcome: Progressing     Problem: GASTROINTESTINAL - ADULT  Goal: Minimal or absence of nausea and/or vomiting  Description: INTERVENTIONS:  - Administer IV fluids if ordered to ensure adequate hydration  - Maintain NPO status until nausea and vomiting are resolved  - Nasogastric tube if ordered  - Administer ordered antiemetic medications as needed  - Provide nonpharmacologic comfort measures as appropriate  - Advance diet as tolerated, if ordered  - Consider nutrition services referral to assist patient with adequate nutrition and appropriate food choices  4/12/2021 1504 by Justo Cormier RN  Outcome: Adequate for Discharge  4/12/2021 0954 by Justo Cormier RN  Outcome: Progressing  Goal: Maintains or returns to baseline bowel function  Description: INTERVENTIONS:  - Assess bowel function  - Encourage oral fluids to ensure adequate hydration  - Administer IV fluids if ordered to ensure adequate hydration  - Administer ordered medications as needed  - Encourage mobilization and activity  - Consider nutritional services referral to assist patient with adequate nutrition and appropriate food choices  4/12/2021 1504 by Justo Cormier RN  Outcome: Adequate for Discharge  4/12/2021 0954 by Justo Cormier RN  Outcome: Progressing  Goal: Maintains adequate nutritional intake  Description: INTERVENTIONS:  - Monitor percentage of each meal consumed  - Identify factors contributing to decreased intake, treat as appropriate  - Assist with meals as needed  - Monitor I&O, weight, and lab values if indicated  - Obtain nutrition services referral as needed  4/12/2021 1504 by Rakan Castle RN  Outcome: Adequate for Discharge  4/12/2021 0954 by Rakan Castle RN  Outcome: Progressing  Goal: Establish and maintain optimal ostomy function  Description: INTERVENTIONS:  - Assess bowel function  - Encourage oral fluids to ensure adequate hydration  - Administer IV fluids if ordered to ensure adequate hydration   - Administer ordered medications as needed  - Encourage mobilization and activity  - Nutrition services referral to assist patient with appropriate food choices  - Assess stoma site  - Consider wound care consult   4/12/2021 1504 by Rakan Castle RN  Outcome: Adequate for Discharge  4/12/2021 0954 by Rakan Castle RN  Outcome: Progressing     Problem: METABOLIC, FLUID AND ELECTROLYTES - ADULT  Goal: Electrolytes maintained within normal limits  Description: INTERVENTIONS:  - Monitor labs and assess patient for signs and symptoms of electrolyte imbalances  - Administer electrolyte replacement as ordered  - Monitor response to electrolyte replacements, including repeat lab results as appropriate  - Instruct patient on fluid and nutrition as appropriate  4/12/2021 1504 by Rakan Castle RN  Outcome: Adequate for Discharge  4/12/2021 0954 by Rakan Castle RN  Outcome: Progressing  Goal: Fluid balance maintained  Description: INTERVENTIONS:  - Monitor labs   - Monitor I/O and WT  - Instruct patient on fluid and nutrition as appropriate  - Assess for signs & symptoms of volume excess or deficit  4/12/2021 1504 by Rakan Castle RN  Outcome: Adequate for Discharge  4/12/2021 0954 by Rakan Castle RN  Outcome: Progressing  Goal: Glucose maintained within target range  Description: INTERVENTIONS:  - Monitor Blood Glucose as ordered  - Assess for signs and symptoms of hyperglycemia and hypoglycemia  - Administer ordered medications to maintain glucose within target range  - Assess nutritional intake and initiate nutrition service referral as needed  4/12/2021 1504 by Poncho Borden RN  Outcome: Adequate for Discharge  4/12/2021 0954 by Poncho Borden RN  Outcome: Progressing     Problem: Potential for Falls  Goal: Patient will remain free of falls  Description: INTERVENTIONS:  - Assess patient frequently for physical needs  -  Identify cognitive and physical deficits and behaviors that affect risk of falls    -  [place] fall precautions as indicated by assessment   - Educate patient/family on patient safety including physical limitations  - Instruct patient to call for assistance with activity based on assessment  - Modify environment to reduce risk of injury  - Consider OT/PT consult to assist with strengthening/mobility  4/12/2021 1504 by Poncho Borden RN  Outcome: Adequate for Discharge  4/12/2021 0954 by Poncho Borden RN  Outcome: Progressing

## 2021-04-13 RX ORDER — GUAIFENESIN 600 MG
1200 TABLET, EXTENDED RELEASE 12 HR ORAL EVERY 12 HOURS SCHEDULED
Qty: 30 TABLET | Refills: 0 | Status: SHIPPED | OUTPATIENT
Start: 2021-04-13

## 2021-04-13 NOTE — UTILIZATION REVIEW
Notification of Discharge  This is a Notification of Discharge from our facility [address]  Please be advised that this patient has been discharge from our facility  Below you will find the admission and discharge date and time including the patients disposition  PRESENTATION DATE: 4/8/2021  4:36 PM  OBS ADMISSION DATE:   IP ADMISSION DATE: 4/8/21 1911   DISCHARGE DATE: 4/12/2021  5:47 PM  DISPOSITION: Home/Self Care Home/Self Care   Admission Orders listed below:  Admission Orders (From admission, onward)     Ordered        04/08/21 1911  Inpatient Admission  Once                   Please contact the UR Department if additional information is required to close this patient's authorization/case  [address] [facility] Utilization Review Department  Main: [phone] x carefully listen to the prompts  All voicemails are confidential   [email or web address]  Send all requests for admission clinical reviews, approved or denied determinations and any other requests to dedicated fax number below belonging to the campus where the patient is receiving treatment   List of dedicated fax numbers:  [address] DENIALS (Administrative/Medical Necessity) [phone]   [address] (Maternity/NICU/Pediatrics) [phone]   Joe Evansi [phone]   Daryel Moles [phone]   Tan Valderrama [phone]   [facility] [address] [place] [address] [phone]   [facility]  [phone]   [address] [facility] [address]  [address] [address] [address] [facility] [phone]

## 2021-04-14 LAB
BACTERIA BLD CULT: NORMAL
BACTERIA BLD CULT: NORMAL

## 2022-10-12 PROBLEM — J12.82 PNEUMONIA DUE TO COVID-19 VIRUS: Status: RESOLVED | Noted: 2021-04-08 | Resolved: 2022-10-12

## 2022-10-12 PROBLEM — U07.1 PNEUMONIA DUE TO COVID-19 VIRUS: Status: RESOLVED | Noted: 2021-04-08 | Resolved: 2022-10-12

## 2023-01-29 ENCOUNTER — HOSPITAL ENCOUNTER (EMERGENCY)
Facility: HOSPITAL | Age: 38
Discharge: HOME | End: 2023-01-29
Attending: EMERGENCY MEDICINE

## 2023-01-29 VITALS
RESPIRATION RATE: 17 BRPM | HEART RATE: 89 BPM | DIASTOLIC BLOOD PRESSURE: 97 MMHG | OXYGEN SATURATION: 99 % | TEMPERATURE: 97.8 F | SYSTOLIC BLOOD PRESSURE: 144 MMHG

## 2023-01-29 DIAGNOSIS — G89.29 CHRONIC LOW BACK PAIN: ICD-10-CM

## 2023-01-29 DIAGNOSIS — L02.811 ABSCESS, SCALP: Primary | ICD-10-CM

## 2023-01-29 DIAGNOSIS — M54.50 CHRONIC LOW BACK PAIN: ICD-10-CM

## 2023-01-29 DIAGNOSIS — R73.9 HYPERGLYCEMIA: ICD-10-CM

## 2023-01-29 LAB — GLUCOSE SERPL-MCNC: 270 MG/DL (ref 65–140)

## 2023-01-29 RX ORDER — IBUPROFEN 600 MG/1
600 TABLET ORAL ONCE
Status: COMPLETED | OUTPATIENT
Start: 2023-01-29 | End: 2023-01-29

## 2023-01-29 RX ORDER — LIDOCAINE HYDROCHLORIDE AND EPINEPHRINE 10; 10 MG/ML; UG/ML
1 INJECTION, SOLUTION INFILTRATION; PERINEURAL ONCE
Status: COMPLETED | OUTPATIENT
Start: 2023-01-29 | End: 2023-01-29

## 2023-01-29 RX ORDER — CLINDAMYCIN HYDROCHLORIDE 300 MG/1
300 CAPSULE ORAL 3 TIMES DAILY
Qty: 20 CAPSULE | Refills: 0 | Status: SHIPPED | OUTPATIENT
Start: 2023-01-29 | End: 2023-02-05

## 2023-01-29 RX ORDER — ONDANSETRON 4 MG/1
4 TABLET, ORALLY DISINTEGRATING ORAL ONCE
Status: COMPLETED | OUTPATIENT
Start: 2023-01-29 | End: 2023-01-29

## 2023-01-29 RX ORDER — CLINDAMYCIN HYDROCHLORIDE 150 MG/1
300 CAPSULE ORAL ONCE
Status: COMPLETED | OUTPATIENT
Start: 2023-01-29 | End: 2023-01-29

## 2023-01-29 RX ADMIN — IBUPROFEN 600 MG: 600 TABLET, FILM COATED ORAL at 06:03

## 2023-01-29 RX ADMIN — LIDOCAINE HYDROCHLORIDE,EPINEPHRINE BITARTRATE 1 ML: 10; .01 INJECTION, SOLUTION INFILTRATION; PERINEURAL at 05:59

## 2023-01-29 RX ADMIN — CLINDAMYCIN HYDROCHLORIDE 300 MG: 150 CAPSULE ORAL at 06:25

## 2023-01-29 RX ADMIN — ONDANSETRON 4 MG: 4 TABLET, ORALLY DISINTEGRATING ORAL at 06:25

## 2023-01-29 NOTE — ED PROVIDER NOTES
History  Chief Complaint   Patient presents with   • Medical Problem     C/o lump on his head since yesterday and chronic back pain, denies injuries     HPI    Prior to Admission Medications   Prescriptions Last Dose Informant Patient Reported? Taking?   ascorbic acid (VITAMIN C) 1000 MG tablet   No No   Sig: Take 1 tablet (1,000 mg total) by mouth every 12 (twelve) hours for 6 doses   cholecalciferol (VITAMIN D3) 1,000 units tablet   No No   Sig: Take 2 tablets (2,000 Units total) by mouth daily for 3 days   famotidine (PEPCID) 20 mg tablet   No No   Sig: Take 1 tablet (20 mg total) by mouth 2 (two) times a day for 6 days   guaiFENesin (MUCINEX) 600 mg 12 hr tablet   No No   Sig: Take 2 tablets (1,200 mg total) by mouth every 12 (twelve) hours      Facility-Administered Medications: None       History reviewed  No pertinent past medical history  Past Surgical History:   Procedure Laterality Date   • BACK SURGERY         History reviewed  No pertinent family history  I have reviewed and agree with the history as documented  E-Cigarette/Vaping     E-Cigarette/Vaping Substances     Social History     Tobacco Use   • Smoking status: Never   • Smokeless tobacco: Never   Substance Use Topics   • Alcohol use: Not Currently   • Drug use: Never       Review of Systems    Physical Exam  Physical Exam  Vitals and nursing note reviewed  Constitutional:       General: He is not in acute distress  Appearance: He is well-developed  He is obese  Comments: Hypertensive in triage, improves on repeat   HENT:      Head: Normocephalic and atraumatic  Eyes:      Conjunctiva/sclera: Conjunctivae normal       Pupils: Pupils are equal, round, and reactive to light  Neck:      Trachea: No tracheal deviation  Cardiovascular:      Rate and Rhythm: Normal rate and regular rhythm  Heart sounds: Normal heart sounds        Comments: Tachycardic in triage, normal on my exam  Pulmonary:      Effort: Pulmonary effort is normal  No respiratory distress  Breath sounds: Normal breath sounds  Abdominal:      General: There is no distension  Palpations: Abdomen is soft  Tenderness: There is no abdominal tenderness  Musculoskeletal:      Cervical back: Normal range of motion  Thoracic back: No tenderness or bony tenderness  Lumbar back: Tenderness (mild) present  No bony tenderness  Back:       Comments: Endorses low back pain with movements, able to stand and walk with steady gait, roll over to a prone position to allow for abscess I&D   Skin:     General: Skin is warm and dry  Neurological:      Mental Status: He is alert and oriented to person, place, and time  GCS: GCS eye subscore is 4  GCS verbal subscore is 5  GCS motor subscore is 6  Comments: Normal strength and sensation bilateral legs    No saddle anesthesia, sensation intact in the webspace of fourth and fifth toes   Psychiatric:         Behavior: Behavior normal          Vital Signs  ED Triage Vitals   Temperature Pulse Respirations Blood Pressure SpO2   01/29/23 0429 01/29/23 0429 01/29/23 0429 01/29/23 0429 01/29/23 0429   97 8 °F (36 6 °C) (!) 109 19 (!) 173/86 98 %      Temp Source Heart Rate Source Patient Position - Orthostatic VS BP Location FiO2 (%)   01/29/23 0429 01/29/23 0429 01/29/23 0429 01/29/23 0429 --   Tympanic Monitor Sitting Left arm       Pain Score       01/29/23 0603       10 - Worst Possible Pain           Vitals:    01/29/23 0429 01/29/23 0626   BP: (!) 173/86 144/97   Pulse: (!) 109 89   Patient Position - Orthostatic VS: Sitting Sitting         Visual Acuity      ED Medications  Medications   ibuprofen (MOTRIN) tablet 600 mg (600 mg Oral Given 1/29/23 0603)   lidocaine-epinephrine (XYLOCAINE/EPINEPHRINE) 1 %-1:100,000 injection 1 mL (1 mL Infiltration Given by Other 1/29/23 0559)   ondansetron (ZOFRAN-ODT) dispersible tablet 4 mg (4 mg Oral Given 1/29/23 0625)   clindamycin (CLEOCIN) capsule 300 mg (300 mg Oral Given 1/29/23 0625)       Diagnostic Studies  Results Reviewed     Procedure Component Value Units Date/Time    Fingerstick Glucose (POCT) [[id number]]  (Abnormal) Collected: 01/29/23 0605    Lab Status: Final result Updated: 01/29/23 0606     POC Glucose 270 mg/dl                  No orders to display              Procedures  Incision and drain    Date/Time: 1/29/2023 6:07 AM  Performed by: Kayode Doll MD  Authorized by: Kayode Doll MD   Universal Protocol:  Consent: Verbal consent obtained  Risks and benefits: risks, benefits and alternatives were discussed  Consent given by: patient  Patient identity confirmed: verbally with patient      Patient location:  Bedside  Location:     Type:  Abscess    Location:  Head/neck    Head/neck location:  Scalp  Pre-procedure details:     Skin preparation:  Chloraprep  Anesthesia (see MAR for exact dosages): Anesthesia method:  Local infiltration    Local anesthetic:  Lidocaine 1% WITH epi  Procedure details:     Complexity:  Simple    Incision types:  Elliptical    Scalpel blade:  11    Incision depth:  Subcutaneous    Wound management:  Probed and deloculated and irrigated with saline    Drainage:  Purulent    Drainage amount:  Scant    Wound treatment:  Wound left open    Packing materials:  None  Post-procedure details:     Patient tolerance of procedure: Tolerated well, no immediate complications  Comments:      Small amount of thick, purulent discharge after initial incision, afterwards drainage of small amount of mixed blood tinged and purulent thin fluid             ED Course                                             Medical Decision Making  This is a 45yo M who presents here today with 2 separate complaints  First, he notes a "lump" on his head for about 24 hours, which has been getting larger and more painful  He denies trauma to the area, prior similar lumps, other lumps or areas of swelling   He did try applying ice, without improvement  He denies fevers or other infectious symptoms  He denies any drainage or discharge  He has applied ice but has not otherwise taken or done anything for it  Secondly, he endorses low back pain "for a while" and is unable to specify how long he has been having problems for  It waxes and wanes, and sometimes gets so bad that he has a hard time getting out of bed because of pain  He denies any associated weakness or numbness, bowel or bladder incontinence  He says it is occasionally diffuse low back, but more of a preference towards the right side  He has never seen anybody for it, never done anything to try and help with the pain when he has had it  He says it has been bothering him more recently  He was admitted for complications due to [place] on 4/8/2021, and while in the hospital was found to be hyperglycemic with an A1c of 11, and told to establish care with a PCP for management of new onset diabetes  He had declined starting medications at that time  The patient does remember being told that his blood sugars were elevated but did not establish care with a PCP for follow-up and has not had labs checked since then  He denies any medical problems  Review of systems: Otherwise negative unless stated as above    He is well-appearing, no acute distress  He has mild tenderness to the right lower back without focal midline tenderness  He has an abscess to the aspect of his head  Exam is otherwise unremarkable  I&D was performed as above with drainage of some purulent fluid and improvement of pain to the area  He was hyperglycemic and I did discuss with him that this is likely contributing to development of his abscess  I stressed to him importance of establishing follow-up care for management of his diabetes, as well as treatment of the abscess    I am not concerned about cauda equina syndrome, spinal epidural abscess or hematoma, bony fracture contributing to his chronic low back pain  I discussed with him management of this, need for follow-up care, and he expresses understanding with this plan  Abscess, scalp: acute illness or injury  Chronic low back pain: acute illness or injury  Hyperglycemia: acute illness or injury  Amount and/or Complexity of Data Reviewed  External Data Reviewed: labs and notes  Labs: ordered  Risk  Prescription drug management            Disposition  Final diagnoses:   Abscess, scalp   Hyperglycemia   Chronic low back pain     Time reflects when diagnosis was documented in both MDM as applicable and the Disposition within this note     Time User Action Codes Description Comment    1/29/2023  6:29 AM Jagruti Diego [L02 811] Abscess, scalp     1/29/2023  6:29 AM Jagruti Diego [R73 9] Hyperglycemia     1/29/2023  6:29 AM Pascual Diego [M54 50,  G89 29] Chronic low back pain       ED Disposition     ED Disposition   Discharge    Condition   Good    Date/Time   Sun Jan 29, 2023  6:24 AM    Comment   Jeremy Garg discharge to home/self care         Follow-up Information     Follow up With Specialties Details Why Contact Info Additional Information    [facility] Primary Care Family Medicine Schedule an appointment as soon as possible for a visit  to set up care with a primary care doctor [address], [address], [address], [phone]    [facility]  Schedule an appointment as soon as possible for a visit  to set up care with a primary care doctor [phone]             Patient's Medications   Discharge Prescriptions    CLINDAMYCIN (CLEOCIN) 300 MG CAPSULE    Take 1 capsule (300 mg total) by mouth 3 (three) times a day for 7 days       Start Date: 1/29/2023 End Date: 2/5/2023       Order Dose: 300 mg       Quantity: 20 capsule    Refills: 0           PDMP Review     None ED Provider  Electronically Signed by           Precious Portillo MD  01/29/23 2575

## 2023-01-29 NOTE — DISCHARGE INSTRUCTIONS
Take the antibiotics as prescribed  Keep the area clean with soap and water  Take ibuprofen (Motrin, Advil) or acetaminophen (Tylenol) as needed for back pain, as per the instructions  Use ice or heat as it helps  Use topical medications, such as [place] or icy Hot, to the area, to help with the pain  Do stretching exercises to keep the muscles of your back loose  Do your normal daily activities, but avoid strenuous activities or heavy lifting until you begin to feel better  Follow up with the [address] [place] [address] for further evaluation and management of your back pain  Your blood sugars were still elevated here  Set up care with a primary care doctor for further management of diabetes

## 2023-01-31 ENCOUNTER — OFFICE VISIT (OUTPATIENT)
Dept: PHYSICAL THERAPY | Facility: CLINIC | Age: 38
End: 2023-01-31

## 2023-01-31 DIAGNOSIS — G89.29 CHRONIC RIGHT-SIDED LOW BACK PAIN WITHOUT SCIATICA: Primary | ICD-10-CM

## 2023-01-31 DIAGNOSIS — M54.50 CHRONIC RIGHT-SIDED LOW BACK PAIN WITHOUT SCIATICA: Primary | ICD-10-CM

## 2023-01-31 NOTE — PROGRESS NOTES
PT Evaluation     Today's date: 2023  Patient name: Elvin Swanson  : 1985  MRN: [id number]  Referring provider: Ilene Mendez PT  Dx:   Encounter Diagnosis     ICD-10-CM    1  Chronic right-sided low back pain without sciatica  M54 50     G89 29           Start Time: 0800  Stop Time: 0845  Total time in clinic (min): 45 minutes    Assessment  Assessment details: Pt is a 40 y/o male presenting to physical therapy with B (R>L) lumbar pain  Upon examination, pt presents with impairments of decreased strength, decreased ROM, and increased pain of pt's lumbar spine resulting in limitations of self-care/ADLs, household activities, bending, and lifting objects  Pt plan of care will focus on improving strength and ROM of pt's lumbar spine as well as decreasing pain and improving tolerance to functional activities  Pt would benefit from skilled physical therapy to facilitate a return to his prior level of function  Pt will be seen via Direct Access for 30 days for this condition  Impairments: abnormal or restricted ROM, activity intolerance, impaired physical strength, lacks appropriate home exercise program, pain with function and poor body mechanics  Functional limitations: self-care/ADLs, household activities, bending, and lifting objectsUnderstanding of Dx/Px/POC: good   Prognosis: good    Goals  STG - 4 weeks  1  Pt will have a subjective decrease in pain of his lumbar spine by 2 levels during performance of bending and lifting objects  2  Pt will demonstrate WNL AROM of his lumbar in all planes to facilitate a return to his prior level of function    LTG - 8 weeks  1  Pt will be able to bend and lift an object from the floor of 10lbs or heavier to facilitate performance of ADLs  2  Pt's FOTO score will improve from 43 to 62 or better to facilitate a return to pt's prior level of function        Plan  Patient would benefit from: PT eval and skilled physical therapy  Planned modality interventions: cryotherapy, thermotherapy: hydrocollator packs and unattended electrical stimulation  Planned therapy interventions: activity modification, ADL training, body mechanics training, flexibility, functional ROM exercises, gait training, graded exercise, home exercise program, joint mobilization, manual therapy, massage, Tolliver taping, motor coordination training, neuromuscular re-education, patient education, postural training, self care, strengthening, stretching, therapeutic activities and therapeutic exercise  Frequency: 1x week  Duration in weeks: 4  Plan of Care beginning date: 2023  Plan of Care expiration date: 3/2/2023        Subjective Evaluation    History of Present Illness  Mechanism of injury: Pt is a 42 y/o male presenting to physical therapy with B (R>L) lumbar pain  Pt reports his low back pain started about a year and a half ago, however, his pain worsened over the last two months with the pain worsening on his R side  Pt reports he will have increased pain with laying on his back, turning to pick something up and sneezing which will cause a severe increase in his pain  Pt reports he will take moltrin and have his wife rub his back, however, the pain will usually persist  Pt reports it will take hours for the pain to go down after aggravating it  Pt reports will having significant difficulty getting up from his bed or a chair because of the pain     Pain  Current pain ratin  At best pain ratin  At worst pain rating: 10  Location: R lumbar  Quality: sharp  Relieving factors: medications and rest  Aggravating factors: standing and lifting  Progression: worsening    Treatments  Previous treatment: medication  Current treatment: physical therapy  Patient Goals  Patient goals for therapy: decreased pain  Patient goal: to get better        Objective     Postural Observations  Seated posture: fair  Standing posture: fair  Correction of posture: makes symptoms worse        Palpation   Left   Hypertonic in the lumbar paraspinals  Tenderness of the lumbar paraspinals  Right   Hypertonic in the lumbar paraspinals  Tenderness of the lumbar paraspinals       Active Range of Motion     Lumbar   Flexion: 40 degrees  with pain Restriction level: moderate  Extension: 10 degrees  with pain Restriction level: maximal  Left lateral flexion: 10 degrees    with pain Restriction level: maximal  Right lateral flexion: 10 degrees  with pain Restriction level: maximal    Additional Active Range of Motion Details  Jose sign demonstrated with lumbar flexion upon returning to standing  Mechanical Assessment    Cervical      Thoracic    Lying extension:   Pain intensity: better  Pain level: decreased    Lumbar      Strength/Myotome Testing     Left Hip   Planes of Motion   Flexion: 4-  Extension: 4-  Abduction: 4-  Adduction: 4-    Right Hip   Planes of Motion   Flexion: 4-  Extension: 4-  Abduction: 4-  Adduction: 4-    Left Knee   Flexion: 4  Extension: 4    Right Knee   Flexion: 4-  Extension: 4-    General Comments:      Lumbar Comments  Lumbar flexion gross strength: 4-/5  Lumbar extension gross strength: 4-/5  Lumbar L lateral flexion gross strength: 4-/5  Lumbar R lateral flexion gross strength: 4-/5    Limitations in flexibility of lumbar paraspinals, and R hamstring             Precautions: Diabetes      Manuals 1/31            SKTC 1x5 20"            DKTC 1x5 20"            Lumbar UPA R HARSHAD                         Neuro Re-Ed             Education on POC, diagnosis, and HEP 5'                                                                                          Ther Ex             RAVIN 2'            Prone press up on elbows 1x10            Standing lumbar extension 1x10            LTR 1x10                                                                Ther Activity                                       Gait Training                                       Modalities

## 2023-02-06 ENCOUNTER — APPOINTMENT (OUTPATIENT)
Dept: PHYSICAL THERAPY | Facility: CLINIC | Age: 38
End: 2023-02-06

## 2023-02-13 ENCOUNTER — OFFICE VISIT (OUTPATIENT)
Dept: PHYSICAL THERAPY | Facility: CLINIC | Age: 38
End: 2023-02-13

## 2023-02-13 DIAGNOSIS — M54.50 CHRONIC RIGHT-SIDED LOW BACK PAIN WITHOUT SCIATICA: Primary | ICD-10-CM

## 2023-02-13 DIAGNOSIS — G89.29 CHRONIC RIGHT-SIDED LOW BACK PAIN WITHOUT SCIATICA: Primary | ICD-10-CM

## 2023-02-13 NOTE — PROGRESS NOTES
Daily Note     Today's date: 2023  Patient name: Nell Booker  : 1985  MRN: [id number]  Referring provider: Jennifer Kulkarni, PT  Dx:   Encounter Diagnosis     ICD-10-CM    1  Chronic right-sided low back pain without sciatica  M54 50     G89 29           Start Time: 930  Stop Time: 1010  Total time in clinic (min): 40 minutes    Subjective: Pt reports he felt somewhat better after his initial evaluation, however, he is in severe pain today in his low back  Objective: See treatment diary below      Assessment: Tolerated treatment fair  Patient demonstrated fatigue post treatment, exhibited good technique with therapeutic exercises and would benefit from continued PT  Pt did not have any relief from symptoms with [facility] or DKTC at today's session, however, pt had slight improvement of pain in his lumbar spine by prone press ups on elbows  Pt required min verbal cues to facilitate performance of proper technique  Assess pt response to treatment at next visit  Plan: Continue per plan of care  Precautions: Diabetes      Manuals            SKTC 1x5 20" p! DKTC 1x5 20" p!            Lumbar UPA R HARSHAD HARSHAD           Grade V lumbar mob  HARSHAD           Prone press up c overpressure  3x10           Neuro Re-Ed             Pt education on proper sitting positions 5' 8'                                                                                         Ther Ex             RAVIN 2' 3'           Prone press up on elbows 1x10 3x10           Standing lumbar extension 1x10 1x10           LTR 1x10 1x15                                                               Ther Activity                                       Gait Training                                       Modalities

## 2023-02-13 NOTE — ASSESSMENT & PLAN NOTE
Hyponatremia 129- asymptomatic  Hypokalemia 3 2- IV supplementation ordered  Likely in the setting of decreased p o  Intake  Patient received 1 L bolus of normal saline in the ED will hold off on any additional fluids given COVID  BMP in a m 
Lab Results   Component Value Date    HGBA1C 11 0 (H) 04/12/2021       Recent Labs     04/12/21  0829 04/12/21  1226 04/12/21  1608   POCGLU 367* 369* 361*       Blood Sugar Average: Last 72 hrs:  (P) 720 [id number]   · Due to persistently elevated glucose levels, A1c was ordered, shows new onset diabetes  · Discussed findings with patient who reports he does not have diabetes and "he drank lots of juice the last few days" which resulted in hyperglycemia  · He would prefer not to be discharged on any medication, does not have follow up with PCP so I referred him to family practice and instructed him to set up an appointment  · Discharge home today
Likely in the setting of COVID-19 pneumonia  Plan for principal problem   symptom management
 Mild COVID pathway as below    COVID19- positive on 4/8    Supplemental oxygen with 3 L saturating 94%  o Wean as tolerated for oxygen saturation greater than 92%   CXR revealed findings consistent with COVID pneumonia   Blood type and screen pending   CBC and CMP daily   Cardiac markers: troponin, CK, BNP pending   Inflammatory markers: CRP, D-Dimer, and ferritin pending   Vitamin D3 2000 IU daily, vitamin C 1g PO BID, zinc 220mg PO daily    AC with enoxaparin given D-dimer   Dexamethasone 6mg IV daily X 10 days   Remdesivir 200mg IV day 1 and 100mg IV daily X4 days    Consideration for convalescent plasma therapy    OOB TID; ambulation and mobilization strongly encouraged   PT consult and evaluation    Self proning strongly encouraged   Supportive care
 Mild Covid 19 pneumonia   Continue decadron, vitamin therapy, remdesivir    Procalcitonin negative, discontinue antibiotics   D dimer<2 5, continue current DVT prophylaxis   Patient feels better, would prefer to continue treatment at home at this time  [facility] o2 evaluation was performed, patient did not qualify for home oxygen   Stable for discharge with remaining course of decadron and vitamin therapy at home   He is requesting a letter for work in which covid 19 was specifically documented, given to patient on discharge
 Mild Covid 19 pneumonia   Continue decadron, vitamin therapy, remdesivir    Procalcitonin negative, discontinue antibiotics   D dimer<2 5, continue current DVT prophylaxis   Trend inflammatory markers   Monitor oxygen status
· Likely in the setting of COVID-19 pneumonia  · Plan for principal problem   · symptom management
· Likely secondary to dehydration and poor po intake  · Now resolved with hydration and supplementation, monitor daily    Results from last 7 days   Lab Units 04/09/21  0456 04/08/21  1729   SODIUM mmol/L 136 129*     Results from last 7 days   Lab Units 04/09/21  0456 04/08/21  1729   POTASSIUM mmol/L 4 0 3 2*
· Likely secondary to dehydration and poor po intake  · Now resolved with hydration and supplementation, monitor daily    Results from last 7 days   Lab Units 04/10/21  0505 04/09/21  0456 04/08/21  1729   SODIUM mmol/L 136 136 129*     Results from last 7 days   Lab Units 04/10/21  0505 04/09/21  0456 04/08/21  1729   POTASSIUM mmol/L 4 5 4 0 3 2*
· Likely secondary to dehydration and poor po intake  · Now resolved with hydration and supplementation, monitor daily    Results from last 7 days   Lab Units 04/11/21  0528 04/10/21  0505 04/09/21  0456 04/08/21  1729   SODIUM mmol/L 136 136 136 129*     Results from last 7 days   Lab Units 04/11/21  0528 04/10/21  0505 04/09/21  0456 04/08/21  1729   POTASSIUM mmol/L 4 7 4 5 4 0 3 2*
· Likely secondary to dehydration and poor po intake  · Now resolved with hydration and supplementation, monitor daily    Results from last 7 days   Lab Units 04/12/21  0505 04/11/21  0528 04/10/21  0505 04/09/21  0456 04/08/21  1729   SODIUM mmol/L 133* 136 136 136 129*     Results from last 7 days   Lab Units 04/12/21  0505 04/11/21  0528 04/10/21  0505 04/09/21  0456 04/08/21  1729   POTASSIUM mmol/L 4 9 4 7 4 5 4 0 3 2*
· Secondary to covid 19 pneumonia  · See plan for covid 19 for further management
37

## 2023-07-14 ENCOUNTER — OFFICE VISIT (OUTPATIENT)
Dept: FAMILY MEDICINE CLINIC | Facility: CLINIC | Age: 38
End: 2023-07-14
Payer: COMMERCIAL

## 2023-07-14 ENCOUNTER — APPOINTMENT (OUTPATIENT)
Dept: RADIOLOGY | Facility: CLINIC | Age: 38
End: 2023-07-14
Payer: COMMERCIAL

## 2023-07-14 VITALS
RESPIRATION RATE: 16 BRPM | HEIGHT: 70 IN | HEART RATE: 100 BPM | SYSTOLIC BLOOD PRESSURE: 140 MMHG | DIASTOLIC BLOOD PRESSURE: 84 MMHG | OXYGEN SATURATION: 98 % | BODY MASS INDEX: 32.21 KG/M2 | TEMPERATURE: 97.5 F | WEIGHT: 225 LBS

## 2023-07-14 DIAGNOSIS — M54.50 CHRONIC BILATERAL LOW BACK PAIN, UNSPECIFIED WHETHER SCIATICA PRESENT: ICD-10-CM

## 2023-07-14 DIAGNOSIS — E11.59 TYPE 2 DIABETES MELLITUS WITH OTHER CIRCULATORY COMPLICATION, WITHOUT LONG-TERM CURRENT USE OF INSULIN (HCC): ICD-10-CM

## 2023-07-14 DIAGNOSIS — Z79.4 TYPE 2 DIABETES MELLITUS WITHOUT COMPLICATION, WITH LONG-TERM CURRENT USE OF INSULIN (HCC): ICD-10-CM

## 2023-07-14 DIAGNOSIS — G89.29 CHRONIC BILATERAL LOW BACK PAIN, UNSPECIFIED WHETHER SCIATICA PRESENT: ICD-10-CM

## 2023-07-14 DIAGNOSIS — I15.2 HYPERTENSION DUE TO ENDOCRINE DISORDER: ICD-10-CM

## 2023-07-14 DIAGNOSIS — E11.9 TYPE 2 DIABETES MELLITUS WITHOUT COMPLICATION, WITH LONG-TERM CURRENT USE OF INSULIN (HCC): ICD-10-CM

## 2023-07-14 DIAGNOSIS — Z76.89 ENCOUNTER TO ESTABLISH CARE: Primary | ICD-10-CM

## 2023-07-14 DIAGNOSIS — L03.811 CELLULITIS OF HEAD EXCEPT FACE: ICD-10-CM

## 2023-07-14 LAB
BACTERIA UR QL AUTO: NORMAL /HPF
BILIRUB UR QL STRIP: NEGATIVE
CLARITY UR: CLEAR
COLOR UR: COLORLESS
GLUCOSE UR STRIP-MCNC: ABNORMAL MG/DL
HGB UR QL STRIP.AUTO: NEGATIVE
KETONES UR STRIP-MCNC: NEGATIVE MG/DL
LEUKOCYTE ESTERASE UR QL STRIP: ABNORMAL
NITRITE UR QL STRIP: NEGATIVE
NON-SQ EPI CELLS URNS QL MICRO: NORMAL /HPF
PH UR STRIP.AUTO: 6.5 [PH]
PROT UR STRIP-MCNC: NEGATIVE MG/DL
RBC #/AREA URNS AUTO: NORMAL /HPF
SL AMB POCT HEMOGLOBIN AIC: 13.1 (ref ?–6.5)
SP GR UR STRIP.AUTO: 1.03 (ref 1–1.03)
UROBILINOGEN UR STRIP-ACNC: <2 MG/DL
WBC #/AREA URNS AUTO: NORMAL /HPF

## 2023-07-14 PROCEDURE — 83036 HEMOGLOBIN GLYCOSYLATED A1C: CPT | Performed by: NURSE PRACTITIONER

## 2023-07-14 PROCEDURE — 72110 X-RAY EXAM L-2 SPINE 4/>VWS: CPT

## 2023-07-14 PROCEDURE — 87086 URINE CULTURE/COLONY COUNT: CPT | Performed by: NURSE PRACTITIONER

## 2023-07-14 PROCEDURE — 99204 OFFICE O/P NEW MOD 45 MIN: CPT | Performed by: NURSE PRACTITIONER

## 2023-07-14 PROCEDURE — 81001 URINALYSIS AUTO W/SCOPE: CPT | Performed by: NURSE PRACTITIONER

## 2023-07-14 RX ORDER — CEPHALEXIN 500 MG/1
500 CAPSULE ORAL EVERY 8 HOURS SCHEDULED
Qty: 21 CAPSULE | Refills: 0 | Status: SHIPPED | OUTPATIENT
Start: 2023-07-14 | End: 2023-07-21

## 2023-07-14 RX ORDER — LISINOPRIL 10 MG/1
10 TABLET ORAL DAILY
Qty: 90 TABLET | Refills: 3 | Status: SHIPPED | OUTPATIENT
Start: 2023-07-14

## 2023-07-14 RX ORDER — LANCETS 33 GAUGE
EACH MISCELLANEOUS
Qty: 100 EACH | Refills: 3 | Status: SHIPPED | OUTPATIENT
Start: 2023-07-14

## 2023-07-14 RX ORDER — PEN NEEDLE, DIABETIC 32GX 5/32"
NEEDLE, DISPOSABLE MISCELLANEOUS
Qty: 100 EACH | Refills: 3 | Status: SHIPPED | OUTPATIENT
Start: 2023-07-14

## 2023-07-14 RX ORDER — BLOOD-GLUCOSE METER
KIT MISCELLANEOUS
Qty: 1 KIT | Refills: 0 | Status: SHIPPED | OUTPATIENT
Start: 2023-07-14

## 2023-07-14 RX ORDER — BLOOD SUGAR DIAGNOSTIC
STRIP MISCELLANEOUS
Qty: 100 EACH | Refills: 3 | Status: SHIPPED | OUTPATIENT
Start: 2023-07-14

## 2023-07-14 RX ORDER — METHOCARBAMOL 500 MG/1
500 TABLET, FILM COATED ORAL 4 TIMES DAILY
Qty: 30 TABLET | Refills: 0 | Status: SHIPPED | OUTPATIENT
Start: 2023-07-14

## 2023-07-14 RX ORDER — METFORMIN HYDROCHLORIDE 500 MG/1
1000 TABLET, EXTENDED RELEASE ORAL
Qty: 180 TABLET | Refills: 1 | Status: SHIPPED | OUTPATIENT
Start: 2023-07-14 | End: 2024-01-10

## 2023-07-14 RX ORDER — INSULIN DEGLUDEC INJECTION 100 U/ML
20 INJECTION, SOLUTION SUBCUTANEOUS DAILY
Qty: 15 ML | Refills: 3 | Status: SHIPPED | OUTPATIENT
Start: 2023-07-14

## 2023-07-14 NOTE — ASSESSMENT & PLAN NOTE
Blood pressure elevated in office. Lisinopril ordered. Discussed the importance of maintaining low-salt heart healthy diet.   Hafsa the importance of maintaining good blood pressure in relation to diabetic management

## 2023-07-14 NOTE — PROGRESS NOTES
Diabetic Foot Exam    Patient's shoes and socks removed. Right Foot/Ankle   Right Foot Inspection  Skin Exam: skin normal and skin intact. No dry skin, no warmth, no callus, no erythema, no maceration, no abnormal color, no pre-ulcer, no ulcer and no callus. Toe Exam: ROM and strength within normal limits and tenderness. Sensory   Vibration: diminished  Proprioception: diminished  Monofilament testing: diminished    Vascular  Capillary refills: < 3 seconds  The right DP pulse is 2+. The right PT pulse is 2+. Right Toe  - Comprehensive Exam  Ecchymosis: none  Arch: normal  Hammertoes: absent  Claw Toes: absent  Swelling: none   Tenderness: none         Left Foot/Ankle  Left Foot Inspection  Skin Exam: skin normal and skin intact. No dry skin, no warmth, no erythema, no maceration, normal color, no pre-ulcer, no ulcer and no callus. Toe Exam: ROM and strength within normal limits and tenderness. Sensory   Vibration: intact  Proprioception: intact  Monofilament testing: intact    Vascular  Capillary refills: < 3 seconds  The left DP pulse is 2+. The left PT pulse is 2+. Left Toe  - Comprehensive Exam  Ecchymosis: none  Arch: normal  Hammertoes: absent  Claw toes: absent  Swelling: none   Tenderness: none           Assign Risk Category  No deformity present  No loss of protective sensation  No weak pulses  Risk: 1  Name: Rylee Stewart      : 1985      MRN: [id number]  Encounter Provider: PARUL Victor  Encounter Date: 2023   Encounter department: [address] [place] [address]. Encounter to establish care  Assessment & Plan:  Patient is here to establish care. Has elevated hemoglobin A1c. Reports he was told he was diabetic a few years ago. Start Lantus, metformin blood work ordered. Referral made for eye exam.  Exam done in office. Discussed the long-term effects of poorly controlled diabetes. Information given on diabetic diet. Testing supplies ordered. Lisinopril started for hypertension      2. Type 2 diabetes mellitus without complication, with long-term current use of insulin (HCC)  -     POCT hemoglobin A1c  -     insulin degludec Rodríguez Adorno FlexTouch) 100 units/mL injection pen; Inject 20 Units under the skin daily  -     lisinopril (ZESTRIL) 10 mg tablet; Take 1 tablet (10 mg total) by mouth daily  -     Ambulatory Referral to Ophthalmology; Future  -     Albumin / creatinine urine ratio  -     metFORMIN (GLUCOPHAGE) 500 mg tablet; Take 1 tablet (500 mg total) by mouth 2 (two) times a day with meals    3. Type 2 diabetes mellitus with other circulatory complication, without long-term current use of insulin (HCC)  -     CBC and differential; Future  -     Comprehensive metabolic panel; Future  -     Lipid panel; Future  -     TSH, 3rd generation with Free T4 reflex; Future  -     Vitamin D 25 hydroxy; Future  -     Cyclic citrul peptide antibody, IgG; Future  -     metFORMIN (GLUCOPHAGE-XR) 500 mg 24 hr tablet; Take 2 tablets (1,000 mg total) by mouth daily with breakfast  -     Insulin Pen Needle (BD Pen Needle Margo U/F) 32G X 4 MM MISC; Use daily as directed with insulin pen  -     Blood Glucose Monitoring Suppl (OneTouch Verio Reflect) w/Device KIT; Check blood sugars once daily. Please substitute with appropriate alternative as covered by patient's insurance. Dx: E11.65  -     glucose blood (OneTouch Verio) test strip; Check blood sugars once daily. Please substitute with appropriate alternative as covered by patient's insurance. Dx: E11.65  -     OneTouch Delica Lancets 62C MISC; Check blood sugars once daily. Please substitute with appropriate alternative as covered by patient's insurance. Dx: E11.65    4. Cellulitis of head except face  -     cephalexin (KEFLEX) 500 mg capsule; Take 1 capsule (500 mg total) by mouth every 8 (eight) hours for 7 days    5.  Chronic bilateral low back pain, unspecified whether sciatica present  - Diclofenac Sodium (VOLTAREN) 1 %; Apply 2 g topically 4 (four) times a day  -     methocarbamol (ROBAXIN) 500 mg tablet; Take 1 tablet (500 mg total) by mouth 4 (four) times a day  -     XR spine lumbar minimum 4 views non injury; Future; Expected date: 07/14/2023  -     UA w Reflex to Microscopic w Reflex to Culture - Clinic Collect  -     Urine culture; Future  -     Urine culture    6. Hypertension due to endocrine disorder  Assessment & Plan:  Blood pressure elevated in office. Lisinopril ordered. Discussed the importance of maintaining low-salt heart healthy diet. Hafsa the importance of maintaining good blood pressure in relation to diabetic management        BMI Counseling: Body mass index is 32.58 kg/m². The BMI is above normal. Nutrition recommendations include decreasing portion sizes, encouraging healthy choices of fruits and vegetables, decreasing fast food intake, consuming healthier snacks, limiting drinks that contain sugar, moderation in carbohydrate intake, increasing intake of lean protein, reducing intake of saturated and trans fat and reducing intake of cholesterol. Exercise recommendations include exercising 3-5 times per week and strength training exercises. No pharmacotherapy was ordered. Rationale for BMI follow-up plan is due to patient being overweight or obese. Depression Screening and Follow-up Plan: Patient was screened for depression during today's encounter. They screened negative with a PHQ-2 score of 0. Subjective      Patient is here to establish care. Last primary care provider-  Past medical history-  Past surgical history- no  Social history-  - 14  Kids- 2 9 and girls  Employment-, [employer]   Smoker-no  Alcohol- ni  Other drugs-  Last diagnostic labs screening- due  Dental exam- no  Eyes- no  Colonoscopy-  Prostate cancer screening-  Current concerns-        Review of Systems   HENT: Voice change: lumbar xray.     Cardiovascular: Positive for chest pain. Gastrointestinal: Positive for constipation. Musculoskeletal: Positive for back pain (lower back). Neurological: Positive for headaches. Current Outpatient Medications on File Prior to Visit   Medication Sig   • ascorbic acid (VITAMIN C) 1000 MG tablet Take 1 tablet (1,000 mg total) by mouth every 12 (twelve) hours for 6 doses   • cholecalciferol (VITAMIN D3) 1,000 units tablet Take 2 tablets (2,000 Units total) by mouth daily for 3 days   • famotidine (PEPCID) 20 mg tablet Take 1 tablet (20 mg total) by mouth 2 (two) times a day for 6 days   • guaiFENesin (MUCINEX) 600 mg 12 hr tablet Take 2 tablets (1,200 mg total) by mouth every 12 (twelve) hours (Patient not taking: Reported on 7/14/2023)       Objective     /84   Pulse 100   Temp 97.5 °F (36.4 °C)   Resp 16   Ht 5' 9.69" (1.77 m)   Wt 102 kg (225 lb)   SpO2 98%   BMI 32.58 kg/m²     Physical Exam  Vitals and nursing note reviewed. Constitutional:       Appearance: He is well-developed. He is obese. HENT:      Head: Normocephalic and atraumatic. Eyes:      Pupils: Pupils are equal, round, and reactive to light. Cardiovascular:      Rate and Rhythm: Normal rate and regular rhythm. Pulses: Normal pulses. no weak pulses          Dorsalis pedis pulses are 2+ on the right side and 2+ on the left side. Posterior tibial pulses are 2+ on the right side and 2+ on the left side. Heart sounds: Normal heart sounds. Pulmonary:      Effort: Pulmonary effort is normal.      Breath sounds: Normal breath sounds. Abdominal:      General: There is distension. Musculoskeletal:         General: Normal range of motion. Cervical back: Normal range of motion. Feet:      Right foot:      Skin integrity: No ulcer, skin breakdown, erythema, warmth, callus or dry skin. Left foot:      Skin integrity: No ulcer, skin breakdown, erythema, warmth, callus or dry skin. Skin:     General: Skin is warm and dry. Findings: Rash (Cellulitis on scalp) present. Neurological:      General: No focal deficit present. Mental Status: He is oriented to person, place, and time. Psychiatric:         Mood and Affect: Mood normal.         Behavior: Behavior normal.         Thought Content:  Thought content normal.         Judgment: Judgment normal.       PARUL Barbosa

## 2023-07-14 NOTE — PATIENT INSTRUCTIONS
Keflex 3 times a day for 8 days this is antibiotic for your head use yogurt or probiotic when you take this antibiotic because it will mess your stomach  Get blood work done fasting  Use Robaxin for muscle pain  Use heat muscle rub  Start units of Tresiba this is a long-acting insulin  Metformin twice a day 20 mg of lisinopril daily  Diabetes and Nutrition   WHAT YOU NEED TO KNOW:   Nutrition plans help keep blood sugar levels steady. They also help delay or prevent complications of diabetes, such as diabetic kidney disease. DISCHARGE INSTRUCTIONS:   Call your local emergency number (911 in the [address]) if:   • You have any of the following signs of a heart attack:      ? Squeezing, pressure, or pain in your chest    ? You may  also have any of the following:     - Discomfort or pain in your back, neck, jaw, stomach, or arm    - Shortness of breath    - Nausea or vomiting    - Lightheadedness or a sudden cold sweat      Return to the emergency department if:   • You have a low blood sugar level and it does not improve with treatment. Symptoms are trouble thinking, a pounding heartbeat, and sweating. • Your blood sugar level is above 240 mg/dL and does not come down within 15 minutes of treatment. • You have ketones in your blood or urine. • You have nausea or are vomiting and cannot keep any food or liquid down. • You have blurred or double vision. • Your breath has a fruity, sweet smell, or your breathing is shallow. Call your doctor or diabetes care team if:   • Your blood sugar levels are higher than your target goals. • You often have low blood sugar levels. • You have trouble coping with diabetes, or you feel anxious or depressed. • You have questions or concerns about your condition or care. A dietitian will help you create a nutrition plan  to meet your needs and your family's needs.  He or she may explain a plan such as the Dietary Approaches to Stop Hypertension (DASH) eating plan or the Mediterranean diet. The goal is for you to reach or maintain healthy weight, blood sugar, blood pressure, and lipid levels. You should meet with the dietitian at least 1 time each year. You will learn the following:  • How food affects your blood sugar levels    • How to create healthy eating habits    • How to make food choices based on your activity level, weight, and glucose levels    • How your favorite foods may fit into your plan    • How to keep track of carbohydrates    • Correct portion sizes for each food    • Changes you can make to your plan if you get pregnant or are breastfeeding    What you can do before you meet with the dietitian:   • Do not skip meals. The goal is to keep your blood sugar level steady. Blood sugar levels may drop too low if you have received insulin and do not eat. • Eat more high-fiber foods. Examples include fresh or frozen fruits and vegetables, whole-grain breads, and beans. Fiber helps control or lower blood sugar and cholesterol levels. Choose whole fruits instead of fruit juice as much as possible. Sugar may be added to juice, and fiber may be removed. • Choose heart-healthy fats. Foods high in heart-healthy fats include olive oil, nuts, avocados, and fatty fish, such as salmon and tuna. Foods high in unhealthy fats include red meat, full-fat dairy products, and soft margarine. Unhealthy fats can increase your risk for heart disease, increase bad cholesterol, and lower good cholesterol. • Choose complex carbohydrates. Foods with complex carbohydrates include brown rice, whole-grain breads and cereals, and cooked beans. Foods with simple carbohydrates include white bread, white rice, most cold cereals, and snack foods. Your plan will include the amount of carbohydrate to have at one time or in a day. Your blood sugar level can get too high if you eat too much carbohydrate at one time.  Blood sugar levels do not spike as high or drop as quickly with complex carbohydrates as with simple carbohydrates. Choose complex carbohydrates whenever possible. • Have less sodium (salt). The risk for high blood pressure (BP) increases with high-sodium foods. Limit high-sodium foods, such as soy sauce, potato chips, and canned soup. Do not add salt to food you cook. Limit your use of table salt. Read labels to have no more than 2,300 milligrams of sodium in one day. • Limit artificial sweeteners. These may be found in food or drinks, such as diet soft drinks or other low-calorie beverages. Artificial sweeteners are low in calories. They may help you lower your overall calories and carbohydrates. It is important not to have more calories from other foods to make up for the calories saved. Artificial sweeteners do not have any nutrition. Eat whole foods and drink water as much as possible. Your plan may include beverages with artificial sweeteners for a short time. These can help you transition from high-sugar beverages to water. • Use the plate method for each meal.  This method can help you eat the right amount of carbohydrates and keep your blood sugar levels under control. ? Draw an imaginary line down the middle of a 9-inch dinner plate. On one side, draw another line to divide that section in half. Your plate will have one large section and 2 small sections. ? Fill the largest section with non-starchy vegetables. These include broccoli, spinach, cucumbers, peppers, cauliflower, and tomatoes. ? Add a starch to one of the small sections. Starches include pasta, rice, whole-grain bread, tortillas, corn, potatoes, and beans. ? Add meat or another source of protein to the other small section. Examples include chicken or turkey without skin, fish, lean beef or pork, low-fat cheese, tofu, and eggs. ? Add dairy products or fruit next to your plate if your meal plan allows. Examples of dairy include skim or 1% milk and low-fat yogurt.  If you do not drink milk or eat dairy products, you may be able to add another serving of starchy food instead. ? Have a low-calorie or calorie-free drink with your meal.  Examples include water or unsweetened tea or coffee. Reach and maintain a healthy weight:  A healthy weight can help you control your diabetes. You can maintain a healthy weight with a nutrition plan and regular physical activity. Ask your healthcare provider what a healthy weight is for you. Ask him or her to help you create a weight loss plan, if needed. Together you can set weight loss and maintenance goals. For example, your goal may be to lose at least 7% of your extra weight in the first 6 months. What you need to know if you choose to drink alcohol:   • Alcohol can cause health problems. Alcohol can cause hypoglycemia (very low blood sugar level), especially if you use insulin. Alcohol can cause high blood sugar and BP levels, and weight gain if you drink too much. • Hypoglycemia can happen hours after you drink alcohol. Check your blood sugar level for several hours after you drink alcohol. Have a source of fast-acting carbohydrates with you in case your level goes too low. You need immediate care if you have signs or symptoms of hypoglycemia, such as sweating, confusion, or fainting. • Limit alcohol as directed. Your healthcare provider can tell you how many drinks are okay for you within 24 hours or within 1 week. Women 21 years or older and men 72 years or older should limit alcohol to 1 drink a day. Men aged 24 to 59 years should limit alcohol to 2 drinks a day. A drink of alcohol is 12 ounces of beer, 5 ounces of wine, or 1½ ounces of liquor. • Always have food when you drink alcohol. Your blood sugar may fall to a low level if you drink when your stomach is empty. Follow up with your doctor or diabetes team as directed:  Write down your questions so you remember to ask them during your visits.   © Copyright [employer] 2022 Information is for End User's use only and may not be sold, redistributed or otherwise used for commercial purposes. The above information is an  only. It is not intended as medical advice for individual conditions or treatments. Talk to your doctor, nurse or pharmacist before following any medical regimen to see if it is safe and effective for you. Type 2 Diabetes Management for Adults   AMBULATORY CARE:   Type 2 diabetes  is a disease that affects how your body uses glucose (sugar). Either your body cannot make enough insulin, or it cannot use the insulin correctly. It is important to keep diabetes controlled to prevent damage to your heart, blood vessels, and other organs. Management will help you feel well and enjoy your daily activities. Your diabetes care team providers can help you make a plan to fit diabetes care into your schedule. Your plan can change over time to fit your needs and your family's needs. Have someone call your local emergency number (911 in the [address]) if:   • You cannot be woken. • You have signs of diabetic ketoacidosis:     ? confusion, fatigue    ? vomiting    ? rapid heartbeat    ? fruity smelling breath    ? extreme thirst    ? dry mouth and skin    • You have any of the following signs of a heart attack:      ? Squeezing, pressure, or pain in your chest    ? You may  also have any of the following:     - Discomfort or pain in your back, neck, jaw, stomach, or arm    - Shortness of breath    - Nausea or vomiting    - Lightheadedness or a sudden cold sweat    • You have any of the following signs of a stroke:      ? Numbness or drooping on one side of your face     ? Weakness in an arm or leg    ? Confusion or difficulty speaking    ? Dizziness, a severe headache, or vision loss    Call your doctor or diabetes care team provider if:   • You have a sore or wound that will not heal.    • You have a change in the amount you urinate.     • Your blood sugar levels are higher than your target goals. • You often have lower blood sugar levels than your target goals. • Your skin is red, dry, warm, or swollen. • You have trouble coping with diabetes, or you feel anxious or depressed. • You have questions or concerns about your condition or care. What you need to know about high blood sugar levels:  High blood sugar levels may not cause any symptoms. You may feel more thirsty or urinate more often than usual. Over time, high blood sugar levels can damage your nerves, blood vessels, tissues, and organs. The following can increase your blood sugar levels:  • Large meals or large amounts of carbohydrates at one time    • Less physical activity    • Stress    • Illness    • A lower dose of diabetes medicine or insulin, or a late dose    What you need to know about low blood sugar levels:  Symptoms include feeling shaky, dizzy, irritable, or confused. You can prevent symptoms by keeping your blood sugar levels from going too low. • Treat a low blood sugar level right away:      ? Drink 4 ounces of juice or have 1 tube of glucose gel. ? Check your blood sugar level again 10 to 15 minutes later. ? When the level goes back to normal, eat a meal or snack to prevent another decrease. • Keep glucose gel, raisins, or hard candy with you at all times to treat a low blood sugar level. • Your blood sugar level can get too low if you take diabetes medicine or insulin and do not eat enough food. • If you use insulin, check your blood sugar level before you exercise. ? If your blood sugar level is below 100 mg/dL, eat 4 crackers or 2 ounces of raisins, or drink 4 ounces of juice. ? Check your level every 30 minutes if you exercise longer than 1 hour. ? You may need a snack during or after exercise. What you can do to manage your blood sugar levels:   • Check your blood sugar levels as directed and as needed.   Several items are available to use to check your levels. You may need to check by testing a drop of blood in a glucose monitor. You may instead be given a continuous glucose monitoring (CGM) device. The device is worn at all times. The CGM checks your blood sugar level every 5 minutes. It sends results to an electronic device such as a smart phone. A CGM can be used with or without an insulin pump. You and your diabetes care team providers will decide on the best method for you. The goal for blood sugar levels before meals  is between 80 and 130 mg/dL and 2 hours after eating  is lower than 180 mg/dL. • Make healthy food choices. Work with a dietitian to develop a meal plan that works for you and your schedule. A dietitian can help you learn how to eat the right amount of carbohydrates during your meals and snacks. Carbohydrates can raise your blood sugar level if you eat too many at one time. Examples of foods that contain carbohydrates are breads, cereals, rice, pasta, and sweets. • Eat high-fiber foods as directed. Fiber helps improve blood sugar levels. Fiber also lowers your risk for heart disease and other problems diabetes can cause. Examples of high-fiber foods include vegetables, whole-grain bread, and beans such as grijalva beans. Your dietitian can tell you how much fiber to have each day. • Get regular physical activity. Physical activity can help you get to your target blood sugar level goal and manage your weight. Get at least 150 minutes of moderate to vigorous aerobic physical activity each week. Do not miss more than 2 days in a row. Do not sit longer than 30 minutes at a time. Your healthcare provider can help you create an activity plan. The plan can include the best activities for you and can help you build your strength and endurance. • Maintain a healthy weight. Ask your team what a healthy weight is for you. A healthy weight can help you control diabetes and prevent heart disease. Ask your team to help you create a weight loss plan, if needed. Weight loss can help make a difference in managing diabetes. Your team will help you set a weight-loss goal, such as 10 to 15 pounds, or 5% of your extra weight. Together you and your team can set manageable weight loss goals. • Take your diabetes medicine or insulin as directed. You may need diabetes medicine, insulin, or both to help control your blood sugar levels. Your healthcare provider will teach you how and when to take your diabetes medicine or insulin. You will also be taught about side effects oral diabetes medicine can cause. Insulin may be injected or given through a pump or pen. You and your providers will decide on the best method for you:    ? An insulin pump  is an implanted device that gives your insulin 24 hours a day. An insulin pump prevents the need for multiple insulin injections in a day. ? An insulin pen  is a device prefilled with the right amount of insulin. ? You and your family members will be taught how to draw up and give insulin  if this is the best method for you. Your providers will also teach you how to dispose of needles and syringes. ? You will learn how much insulin you need  and when to give it. You will be taught when not to give insulin. You will also be taught what to do if your blood sugar level drops too low. This may happen if you take insulin and do not eat the right amount of carbohydrates. More ways to manage type 2 diabetes:   • Wear medical alert identification. Wear medical alert jewelry or carry a card that says you have diabetes. Ask your provider where to get these items. • Do not smoke. Nicotine and other chemicals in cigarettes and cigars can cause lung and blood vessel damage. It also makes it more difficult to manage your diabetes. Ask your provider for information if you currently smoke and need help to quit.  Do not use e-cigarettes or smokeless tobacco in place of cigarettes or to help you quit. They still contain nicotine. • Check your feet each day for cuts, scratches, calluses, or other wounds. Look for redness and swelling, and feel for warmth. Wear shoes that fit well. Check your shoes for rocks or other objects that can hurt your feet. Do not walk barefoot or wear shoes without socks. Wear cotton socks to help keep your feet dry. • Ask about vaccines you may need. You have a higher risk for serious illness if you get the flu, pneumonia, COVID-19, or hepatitis. Ask your provider if you should get vaccines to prevent these or other diseases, and when to get the vaccines. • Talk to your provider if you become stressed about diabetes care. Sometimes being able to fit diabetes care into your life can cause increased stress. The stress can cause you not to take care of yourself properly. Your care team providers can help by offering tips about self-care. Your providers may suggest you talk to a mental health provider who can listen and offer help with self-care issues. • Have your A1c checked as directed. Your provider may check your A1c every 3 months, or 2 times each year if your diabetes is controlled. An A1c test shows the average amount of sugar in your blood over the past 2 to 3 months. Your provider will tell you what your A1c level should be. • Have screening tests as directed. Your provider may recommend screening for complications of diabetes and other conditions that may develop. Some screenings may begin right away and some may happen within the first 5 years of diagnosis:    ? Examples of diabetes complications  include kidney problems, high cholesterol, high blood pressure, blood vessel problems, eye problems, and sleep apnea. ? You may be screened for a low vitamin B level  if you take oral diabetes medicine for a long time. ? Women of childbearing years may be screened  for polycystic ovarian syndrome (PCOS).     Follow up with your doctor or diabetes care team providers as directed: You may need to have blood tests done before your follow-up visit. The test results will show if changes need to be made in your treatment or self-care. Talk to your provider if you cannot afford your medicine. Write down your questions so you remember to ask them during your visits. © Copyright Pérez Basilio 2022 Information is for End User's use only and may not be sold, redistributed or otherwise used for commercial purposes. The above information is an  only. It is not intended as medical advice for individual conditions or treatments. Talk to your doctor, nurse or pharmacist before following any medical regimen to see if it is safe and effective for you. Cuidado del pie para personas con diabetes   CUIDADO AMBULATORIO:   Lo que usted necesita saber acerca del cuidado del pie: Los niveles altos de azúcar en la sadaf a hany plazo pueden dañar los vasos sanguíneos y los nervios en diane piernas y pies. Nohemy daño dificulta que sienta presión, dolor, temperatura y el tacto. Es posible que usted no sienta rory cortada o rory úlcera o que los zapatos están muy apretados. El cuidado del pie es necesario para evitar problemas graves, jake rory infección o rory amputación. La diabetes podría provocar que los dedos de diane pies se tuerzan o se encorven RIDDLE. Estos cambios podrían afectar la manera en que usted camina y pueden conllevar al aumento de la presión en ordonez pie. La presión puede disminuir el flujo sanguíneo a diane pies. La falta del flujo sanguíneo aumenta ordonez riesgo de rory úlcera en [employer]. Llame al proveedor del equipo de cuidados de bob si:  • Dinae pies se ponen entumecidos, débiles o difícil de . • Usted tiene pus drenando de rory llaga en ordonez pie. • Usted tiene rory herida en ordonez pie que se hace más danilo, más profunda o que no thomas.     • Usted nota que tiene ampollas, cortadas, rasguños, callos o llagas en ordonez pie.    • Usted tiene fiebre y diane pies se ponen rojos, calientes e inflamados. • Las uñas de diane pies se vuelven gruesas, encorvadas o Manti. • Le es difícil revisarse los pies porque ordonez visión no está darrell. • Usted tiene preguntas o inquietudes acerca de ordonez condición o cuidado. Cómo cuidar de diane pies:  • Revísese los pies a diario. Observe todo el pie, incluyendo la planta del pie, entre y [employer] dedos. Revise si hay heridas y callos. Use un jamar para verse la planta de los pies. La piel de los pies podría estar brillante, estirada o más obscura de lo normal. Diane pies también podrían estar fríos y pálidos. Pase diane mic por encima, por debajo, por los lados y [place] dedos del pie para sentir la piel. El enrojecimiento, inflamación y calor son signos de problemas con el flujo sanguíneo que pueden conllevar a rory úlcera en el pie. No trate de quitarse los callos usted mismo. No ignore problemas pequeños, jake la piel reseca o heridas pequeñas. Con el tiempo, estos puede representar rory amenaza para la benito si no se les da el cuidado apropiado. • [employer] todos los días con agua tibia y [place]. No use [place], porque esto puede lesionarle el pie. Séquese los pies suavemente con rory toalla después de lavarlos. Seque entre y [employer] dedos. • Aplique rory loción o un humectante sobre diane pies secos. Pregunte al médico del equipo de cuidados de bob cuáles lociones son las mejores que puede usar. No  aplique loción o humectante entre diane dedos. [employer] dedos del pie podría causar rupturas de la piel. • Kyle las uñas de los pies correctamente. Lime o kyle las uñas de los pies en línea recta. Use un cepillo suave para limpiar alrededor [employer]. Si las [address] gruesas, es posible que necesite que un médico del equipo de cuidados de bob o un especialista se las kyle. • Proteja diane pies.  No  camine descalzo ni use zapatos sin calcetines. Compruebe que no haya piedras u otros objetos dentro de stefania zapatos que le podrían [employer]. Use calcetines de algodón para ayudar a [place]. Use calcetines sin costura en los dedos o póngaselos con la costura hacia afuera. Cámbiese los calcetines diariamente. No use calcetines sucios ni húmedos. • Use zapatos que le calcen darrell. Use zapatos que no rocen ninguna parte de stefania pies. [place] calzado debería ser de ½ a ¾ pulgada (1 a 2 centímetros) más grandes que stefania pies. [place] calzado también debería tener espacio adicional alrededor de la parte más ancha de stefania pies. El calzado para caminar o atlético con cordones o correas que se ajustan son los mejores. Pida ayuda al médico del equipo de cuidados de bob para elegir un par de zapatos que le calcen darrell. Pregúntele si debe usar algún tipo de plantilla, soporte o vendaje en stefania pies. • Asista a stefania citas de seguimiento. El médico del equipo de cuidados de bob hará rory revisión de los pies al menos 1 vez al [place]. Es posible que usted necesite hacerse un examen de pie más seguido si tiene los nervios dañados, deformidad en el pie o [place]. El médico revisará si hay daño al nervio y qué tan darrell usted puede sentir stefania pies. Revisará ordonez calzado para adiel si le [employer]. • No fume. Fumar puede dañar los vasos sanguíneos y aumentar ordonez riesgo de úlceras en los pies. Pida al médico de ordonez equipo de cuidados de bob información si usted fuma actualmente y [place] para dejar de hacerlo. Los cigarrillos electrónicos o el tabaco sin humo igualmente contienen nicotina. Consulte con ordonez médico del equipo de cuidados de bob antes de utilizar estos productos. Acuda a stefania consultas de control con el médico del equipo de cuidado de la diabetes o un especialista en pies según le indicaron: Es necesario que le revisen los pies por lo menos 1 vez al [place].  Es posible que usted necesite hacerse un examen de pie más seguido si tiene los nervios dañados, deformidad en el pie o úlceras. Anote stefania preguntas para que se acuerde de hacerlas jose stefania visitas. © Copyright Venetta Snide 2022 Information is for End User's use only and may not be sold, redistributed or otherwise used for commercial purposes. Esta información es sólo para uso en educación. [place] intención no es darle un consejo médico sobre enfermedades o tratamientos. Colsulte con ordonez Joya Portillo farmacéutico antes de seguir cualquier régimen médico para saber si es seguro y efectivo para usted. What to Do if Your Blood Sugar is Low   AMBULATORY CARE:   Low blood sugar levels  (hypoglycemia) can happen with Type 1 and Type 2 diabetes. Low levels are more likely to happen if you use insulin. Hypoglycemia can cause you to have falls, accidents, and injuries. A blood sugar level that gets too low can lead to seizures, coma, and death. Learn to recognize the symptoms early so you can get treatment quickly. When your blood sugar is low you may feel:  • Sweaty    • Nervous or shaky    • Anxious or irritable    • Confused    • A fast, pounding heartbeat    • Extremely hungry    Have someone call your local emergency number (911 in the [address]) if:   • You cannot be woken. • You have a seizure. Call your doctor if:   • You have symptoms of a low blood sugar level, such as trouble thinking, sweating, or a pounding heartbeat. • Your blood sugar level is lower than normal and it does not improve with treatment. • You often have lower blood sugar levels than your target goals. • You have trouble coping with your illness, or you feel anxious or depressed. • You have questions or concerns about your condition or care. What to do if you have symptoms of low blood sugar:   • Check your blood sugar level, if possible. Your blood sugar level is too low if it is at or below 70 mg/dL. • Eat or drink 15 grams of fast-acting carbohydrate.  Fast-acting carbohydrates will raise your blood sugar level quickly. Examples of 15 grams of fast-acting carbohydrates:     ? 4 ounces (½ cup) of fruit juice     ? 4 ounces of regular soda    ? 2 tablespoons of raisins     ? 1 tube of glucose gel or 3 to 4 glucose tablets       • Check your blood sugar level 15 minutes later. If the level is still low (less than 100 mg/dL), eat another 15 grams of carbohydrate. When the level returns to 100 mg/dL, eat a snack or meal that contains carbohydrates. This will help prevent another drop in blood sugar. • Teach people close to you how to use your glucagon kit. Your blood sugar may be too low for you to be awake. People need to know when and how to use your kit. Prevent low blood sugar levels:  Prevent low blood sugar by knowing what increases your risk. Ask your healthcare provider for ways to prevent low blood sugar levels. Any of the following can increase your risk of low blood sugar:  • Fasting for tests or procedures    • During or after intense exercise    • Late or postponed meals    • Sleeping (you may need a bedtime snack)     • Drinking alcohol if you use insulin or insulin releasing pills    Follow up with your doctor as directed:  Write down your questions so you remember to ask them during your visits. © Copyright Robby Waldrop 2022 Information is for End User's use only and may not be sold, redistributed or otherwise used for commercial purposes. The above information is an  only. It is not intended as medical advice for individual conditions or treatments. Talk to your doctor, nurse or pharmacist before following any medical regimen to see if it is safe and effective for you.

## 2023-07-14 NOTE — ASSESSMENT & PLAN NOTE
Patient is here to establish care. Has elevated hemoglobin A1c. Reports he was told he was diabetic a few years ago. Start Lantus, metformin blood work ordered. Referral made for eye exam.  Exam done in office. Discussed the long-term effects of poorly controlled diabetes. Information given on diabetic diet. Testing supplies ordered.   Lisinopril started for hypertension

## 2023-07-15 LAB — BACTERIA UR CULT: NORMAL

## 2023-07-27 ENCOUNTER — TELEPHONE (OUTPATIENT)
Dept: FAMILY MEDICINE CLINIC | Facility: CLINIC | Age: 38
End: 2023-07-27

## 2023-07-28 ENCOUNTER — APPOINTMENT (OUTPATIENT)
Dept: LAB | Facility: CLINIC | Age: 38
End: 2023-07-28
Payer: COMMERCIAL

## 2023-07-28 ENCOUNTER — OFFICE VISIT (OUTPATIENT)
Dept: FAMILY MEDICINE CLINIC | Facility: CLINIC | Age: 38
End: 2023-07-28
Payer: COMMERCIAL

## 2023-07-28 VITALS
HEIGHT: 70 IN | WEIGHT: 226 LBS | DIASTOLIC BLOOD PRESSURE: 82 MMHG | TEMPERATURE: 97.5 F | RESPIRATION RATE: 16 BRPM | SYSTOLIC BLOOD PRESSURE: 116 MMHG | BODY MASS INDEX: 32.35 KG/M2 | OXYGEN SATURATION: 96 % | HEART RATE: 76 BPM

## 2023-07-28 DIAGNOSIS — Z00.01 ENCOUNTER FOR WELL ADULT EXAM WITH ABNORMAL FINDINGS: Primary | ICD-10-CM

## 2023-07-28 DIAGNOSIS — E11.59 TYPE 2 DIABETES MELLITUS WITH OTHER CIRCULATORY COMPLICATION, WITH LONG-TERM CURRENT USE OF INSULIN (HCC): ICD-10-CM

## 2023-07-28 DIAGNOSIS — Z79.4 TYPE 2 DIABETES MELLITUS WITH OTHER CIRCULATORY COMPLICATION, WITH LONG-TERM CURRENT USE OF INSULIN (HCC): ICD-10-CM

## 2023-07-28 DIAGNOSIS — L30.9 ECZEMA, UNSPECIFIED TYPE: ICD-10-CM

## 2023-07-28 DIAGNOSIS — M51.36 DEGENERATIVE DISC DISEASE, LUMBAR: ICD-10-CM

## 2023-07-28 DIAGNOSIS — E11.59 TYPE 2 DIABETES MELLITUS WITH OTHER CIRCULATORY COMPLICATION, WITHOUT LONG-TERM CURRENT USE OF INSULIN (HCC): ICD-10-CM

## 2023-07-28 LAB
25(OH)D3 SERPL-MCNC: 8.4 NG/ML (ref 30–100)
ALBUMIN SERPL BCP-MCNC: 3.7 G/DL (ref 3.5–5)
ALP SERPL-CCNC: 70 U/L (ref 46–116)
ALT SERPL W P-5'-P-CCNC: 41 U/L (ref 12–78)
ANION GAP SERPL CALCULATED.3IONS-SCNC: 5 MMOL/L
AST SERPL W P-5'-P-CCNC: 23 U/L (ref 5–45)
BASOPHILS # BLD AUTO: 0.07 THOUSANDS/ÂΜL (ref 0–0.1)
BASOPHILS NFR BLD AUTO: 1 % (ref 0–1)
BILIRUB SERPL-MCNC: 0.47 MG/DL (ref 0.2–1)
BUN SERPL-MCNC: 11 MG/DL (ref 5–25)
CALCIUM SERPL-MCNC: 9.2 MG/DL (ref 8.3–10.1)
CHLORIDE SERPL-SCNC: 108 MMOL/L (ref 96–108)
CHOLEST SERPL-MCNC: 164 MG/DL
CO2 SERPL-SCNC: 25 MMOL/L (ref 21–32)
CREAT SERPL-MCNC: 0.81 MG/DL (ref 0.6–1.3)
CREAT UR-MCNC: 159 MG/DL
EOSINOPHIL # BLD AUTO: 0.26 THOUSAND/ÂΜL (ref 0–0.61)
EOSINOPHIL NFR BLD AUTO: 2 % (ref 0–6)
ERYTHROCYTE [DISTWIDTH] IN BLOOD BY AUTOMATED COUNT: 12.1 % (ref 11.6–15.1)
GFR SERPL CREATININE-BSD FRML MDRD: 113 ML/MIN/1.73SQ M
GLUCOSE P FAST SERPL-MCNC: 155 MG/DL (ref 65–99)
HCT VFR BLD AUTO: 46.3 % (ref 36.5–49.3)
HDLC SERPL-MCNC: 29 MG/DL
HGB BLD-MCNC: 15.4 G/DL (ref 12–17)
IMM GRANULOCYTES # BLD AUTO: 0.05 THOUSAND/UL (ref 0–0.2)
IMM GRANULOCYTES NFR BLD AUTO: 1 % (ref 0–2)
LDLC SERPL CALC-MCNC: 93 MG/DL (ref 0–100)
LYMPHOCYTES # BLD AUTO: 3.23 THOUSANDS/ÂΜL (ref 0.6–4.47)
LYMPHOCYTES NFR BLD AUTO: 30 % (ref 14–44)
MCH RBC QN AUTO: 29.8 PG (ref 26.8–34.3)
MCHC RBC AUTO-ENTMCNC: 33.3 G/DL (ref 31.4–37.4)
MCV RBC AUTO: 90 FL (ref 82–98)
MICROALBUMIN UR-MCNC: 124 MG/L (ref 0–20)
MICROALBUMIN/CREAT 24H UR: 78 MG/G CREATININE (ref 0–30)
MONOCYTES # BLD AUTO: 1.06 THOUSAND/ÂΜL (ref 0.17–1.22)
MONOCYTES NFR BLD AUTO: 10 % (ref 4–12)
NEUTROPHILS # BLD AUTO: 6 THOUSANDS/ÂΜL (ref 1.85–7.62)
NEUTS SEG NFR BLD AUTO: 56 % (ref 43–75)
NONHDLC SERPL-MCNC: 135 MG/DL
NRBC BLD AUTO-RTO: 0 /100 WBCS
PLATELET # BLD AUTO: 278 THOUSANDS/UL (ref 149–390)
PMV BLD AUTO: 10.3 FL (ref 8.9–12.7)
POTASSIUM SERPL-SCNC: 3.8 MMOL/L (ref 3.5–5.3)
PROT SERPL-MCNC: 7.9 G/DL (ref 6.4–8.4)
RBC # BLD AUTO: 5.16 MILLION/UL (ref 3.88–5.62)
SODIUM SERPL-SCNC: 138 MMOL/L (ref 135–147)
TRIGL SERPL-MCNC: 212 MG/DL
TSH SERPL DL<=0.05 MIU/L-ACNC: 2.18 UIU/ML (ref 0.45–4.5)
WBC # BLD AUTO: 10.67 THOUSAND/UL (ref 4.31–10.16)

## 2023-07-28 PROCEDURE — 80053 COMPREHEN METABOLIC PANEL: CPT

## 2023-07-28 PROCEDURE — 80061 LIPID PANEL: CPT

## 2023-07-28 PROCEDURE — 99395 PREV VISIT EST AGE 18-39: CPT | Performed by: NURSE PRACTITIONER

## 2023-07-28 PROCEDURE — 82306 VITAMIN D 25 HYDROXY: CPT

## 2023-07-28 PROCEDURE — 84443 ASSAY THYROID STIM HORMONE: CPT

## 2023-07-28 PROCEDURE — 85025 COMPLETE CBC W/AUTO DIFF WBC: CPT

## 2023-07-28 PROCEDURE — 86200 CCP ANTIBODY: CPT

## 2023-07-28 PROCEDURE — 36415 COLL VENOUS BLD VENIPUNCTURE: CPT

## 2023-07-28 RX ORDER — MOMETASONE FUROATE 1 MG/G
CREAM TOPICAL DAILY
Qty: 45 G | Refills: 0 | Status: SHIPPED | OUTPATIENT
Start: 2023-07-28

## 2023-07-28 RX ORDER — CYCLOBENZAPRINE HCL 5 MG
5 TABLET ORAL 3 TIMES DAILY PRN
Qty: 30 TABLET | Refills: 0 | Status: SHIPPED | OUTPATIENT
Start: 2023-07-28

## 2023-07-28 RX ORDER — DULAGLUTIDE 0.75 MG/.5ML
0.75 INJECTION, SOLUTION SUBCUTANEOUS
Qty: 6 ML | Refills: 1 | Status: SHIPPED | OUTPATIENT
Start: 2023-07-28

## 2023-07-28 NOTE — ASSESSMENT & PLAN NOTE
Annual physical completed. Patient did blood work this morning. Fasting blood glucose is greater than 130s. We will add Trulicity. Patient reports that he has been tolerating the meds well. Continues to have numbness and tingling in both legs. Discussed neuropathy. Patient is unable to take gabapentin or Lyrica due to the sedation effects. Patient only sleeps about 3 hours at night. Discussed self-care. Reviewed blood work when is resulted. Advised to get eye exam done. Furl made to sports medicine for spondylosis, lower back pain. Discussed adverse effects of poorly controlled diabetes.

## 2023-07-28 NOTE — PROGRESS NOTES
ADULT ANNUAL [id number] [facility]    NAME: Cristi Ohara  AGE: 41 y.o. SEX: male  : 1985     DATE: 2023     Assessment and Plan:     Problem List Items Addressed This Visit        Endocrine    Diabetes mellitus ([address])    Relevant Medications    dulaglutide (Trulicity) 2.67 HU/1.1QD injection       Other    Encounter for well adult exam with abnormal findings - Primary     Annual physical completed. Patient did blood work this morning. Fasting blood glucose is greater than 130s. We will add Trulicity. Patient reports that he has been tolerating the meds well. Continues to have numbness and tingling in both legs. Discussed neuropathy. Patient is unable to take gabapentin or Lyrica due to the sedation effects. Patient only sleeps about 3 hours at night. Discussed self-care. Reviewed blood work when is resulted. Advised to get eye exam done. Furl made to sports medicine for spondylosis, lower back pain. Discussed adverse effects of poorly controlled diabetes. Other Visit Diagnoses     Eczema, unspecified type        Relevant Medications    mometasone (ELOCON) 0.1 % cream    Degenerative disc disease, lumbar        Relevant Medications    cyclobenzaprine (FLEXERIL) 5 mg tablet    Other Relevant Orders    Ambulatory Referral to Sports Medicine          Immunizations and preventive care screenings were discussed with patient today. Appropriate education was printed on patient's after visit summary. Counseling:  Alcohol/drug use: discussed moderation in alcohol intake, the recommendations for healthy alcohol use, and avoidance of illicit drug use. Dental Health: discussed importance of regular tooth brushing, flossing, and dental visits. Injury prevention: discussed safety/seat belts, safety helmets, smoke detectors, carbon dioxide detectors, and smoking near bedding or upholstery.   Sexual health: discussed sexually transmitted diseases, partner selection, use of condoms, avoidance of unintended pregnancy, and contraceptive alternatives. Exercise: the importance of regular exercise/physical activity was discussed. Recommend exercise 3-5 times per week for at least 30 minutes. Return in about 3 months (around 10/28/2023). Chief Complaint:     Chief Complaint   Patient presents with   • Follow-up      History of Present Illness:     Adult Annual Physical   Patient here for a comprehensive physical exam. The patient reports problems - Lower back pain, #tingling in both legs. Diet and Physical Activity  Diet/Nutrition: poor diet. Exercise: no formal exercise. Depression Screening  PHQ-2/9 Depression Screening         General Health  Sleep: sleeps poorly and gets 1-3 hours of sleep on average. Hearing: normal - bilateral.  Vision: most recent eye exam >1 year ago. Dental: no dental visits for >1 year and brushes teeth twice daily.  Health  History of STDs?: no.     Review of Systems:     Review of Systems   Constitutional: Negative. HENT: Negative. Eyes: Negative. Respiratory: Negative. Cardiovascular: Negative. Gastrointestinal: Negative. Endocrine: Negative. Genitourinary: Negative. Musculoskeletal: Positive for arthralgias and back pain. Skin: Negative. Allergic/Immunologic: Negative. Neurological: Positive for numbness. Hematological: Negative. Psychiatric/Behavioral: Negative. Past Medical History:     History reviewed. No pertinent past medical history.    Past Surgical History:     Past Surgical History:   Procedure Laterality Date   • BACK SURGERY        Social History:     Social History     Socioeconomic History   • Marital status: Single     Spouse name: None   • Number of children: None   • Years of education: None   • Highest education level: None   Occupational History   • None   Tobacco Use   • Smoking status: Never   • Smokeless tobacco: Never   Vaping Use   • Vaping Use: Never used   Substance and Sexual Activity   • Alcohol use: Not Currently   • Drug use: Never   • Sexual activity: Yes     Partners: Male   Other Topics Concern   • None   Social History Narrative   • None     Social Determinants of Health     Financial Resource Strain: Not on file   Food Insecurity: Not on file   Transportation Needs: Not on file   Physical Activity: Not on file   Stress: Not on file   Social Connections: Not on file   Intimate Partner Violence: Not on file   Housing Stability: Not on file      Family History:     Family History   Family history unknown: Yes      Current Medications:     Current Outpatient Medications   Medication Sig Dispense Refill   • Blood Glucose Monitoring Suppl (OneTouch Verio Reflect) w/Device KIT Check blood sugars once daily. Please substitute with appropriate alternative as covered by patient's insurance. Dx: E11.65 1 kit 0   • cyclobenzaprine (FLEXERIL) 5 mg tablet Take 1 tablet (5 mg total) by mouth 3 (three) times a day as needed for muscle spasms 30 tablet 0   • Diclofenac Sodium (VOLTAREN) 1 % Apply 2 g topically 4 (four) times a day 100 g 0   • dulaglutide (Trulicity) 4.26 LK/3.8ZP injection Inject 0.5 mL (0.75 mg total) under the skin every 7 days 6 mL 1   • glucose blood (OneTouch Verio) test strip Check blood sugars once daily. Please substitute with appropriate alternative as covered by patient's insurance.  Dx: E11.65 100 each 3   • insulin degludec Skipper Prude FlexTouch) 100 units/mL injection pen Inject 20 Units under the skin daily 15 mL 3   • Insulin Pen Needle (BD Pen Needle Margo U/F) 32G X 4 MM MISC Use daily as directed with insulin pen 100 each 3   • lisinopril (ZESTRIL) 10 mg tablet Take 1 tablet (10 mg total) by mouth daily 90 tablet 3   • metFORMIN (GLUCOPHAGE) 500 mg tablet Take 1 tablet (500 mg total) by mouth 2 (two) times a day with meals 60 tablet 0   • metFORMIN (GLUCOPHAGE-XR) 500 mg 24 hr tablet Take 2 tablets (1,000 mg total) by mouth daily with breakfast 180 tablet 1   • mometasone (ELOCON) 0.1 % cream Apply topically daily 45 g 0   • OneTouch Delica Lancets 35K MISC Check blood sugars once daily. Please substitute with appropriate alternative as covered by patient's insurance. Dx: E11.65 100 each 3   • ascorbic acid (VITAMIN C) 1000 MG tablet Take 1 tablet (1,000 mg total) by mouth every 12 (twelve) hours for 6 doses 6 tablet 0   • cholecalciferol (VITAMIN D3) 1,000 units tablet Take 2 tablets (2,000 Units total) by mouth daily for 3 days 6 tablet 0   • famotidine (PEPCID) 20 mg tablet Take 1 tablet (20 mg total) by mouth 2 (two) times a day for 6 days 12 tablet 0   • guaiFENesin (MUCINEX) 600 mg 12 hr tablet Take 2 tablets (1,200 mg total) by mouth every 12 (twelve) hours (Patient not taking: Reported on 7/14/2023) 30 tablet 0     No current facility-administered medications for this visit. Allergies:     No Known Allergies   Physical Exam:     /82   Pulse 76   Temp 97.5 °F (36.4 °C) (Temporal)   Resp 16   Ht 5' 9.69" (1.77 m)   Wt 103 kg (226 lb)   SpO2 96%   BMI 32.72 kg/m²     Physical Exam  Constitutional:       Appearance: He is well-developed. He is obese. HENT:      Head: Normocephalic and atraumatic. Right Ear: External ear normal.      Left Ear: External ear normal.      Nose: Nose normal.      Mouth/Throat:      Pharynx: No oropharyngeal exudate. Eyes:      Conjunctiva/sclera: Conjunctivae normal.      Pupils: Pupils are equal, round, and reactive to light. Cardiovascular:      Rate and Rhythm: Normal rate and regular rhythm. Heart sounds: Normal heart sounds. No murmur heard. Pulmonary:      Effort: Pulmonary effort is normal. No respiratory distress. Breath sounds: Normal breath sounds. No wheezing. Chest:      Chest wall: No tenderness. Abdominal:      General: There is no distension. Tenderness: There is no abdominal tenderness.    Musculoskeletal: General: No tenderness (Lower back) or deformity. Normal range of motion. Cervical back: Normal range of motion and neck supple. Skin:     General: Skin is warm and dry. Neurological:      Mental Status: He is alert and oriented to person, place, and time. Cranial Nerves: No cranial nerve deficit. Sensory: No sensory deficit. Motor: No abnormal muscle tone. Coordination: Coordination normal.      Deep Tendon Reflexes: Reflexes normal.   Psychiatric:         Mood and Affect: Mood normal.         Behavior: Behavior normal.         Thought Content:  Thought content normal.         Judgment: Judgment normal.          PARUL Hernandez   [address] [facility]

## 2023-07-28 NOTE — PATIENT INSTRUCTIONS
Continue metformin and Lyn Goltz  Start Trulicity weekly  Make appointment with Dr. Chidi Curran for this medicine  May take Flexeril for acute levels of pain  Wellness Visit for Adults   AMBULATORY CARE:   A wellness visit  is when you see your healthcare provider to get screened for health problems. Your healthcare provider will also give you advice on how to stay healthy. Write down your questions so you remember to ask them. Ask your healthcare provider how often you should have a wellness visit. What happens at a wellness visit:  Your healthcare provider will ask about your health, and your family history of health problems. This includes high blood pressure, heart disease, and cancer. He or she will ask if you have symptoms that concern you, if you smoke, and about your mood. You may also be asked about your intake of medicines, supplements, food, and alcohol. Any of the following may be done:  • Your weight  will be checked. Your height may also be checked so your body mass index (BMI) can be calculated. Your BMI shows if you are at a healthy weight. • Your blood pressure  and heart rate will be checked. Your temperature may also be checked. • Blood and urine tests  may be done. Blood tests may be done to check your cholesterol levels. Abnormal cholesterol levels increase your risk for heart disease and stroke. You may also need a blood or urine test to check for diabetes if you are at increased risk. Urine tests may be done to look for signs of an infection or kidney disease. • A physical exam  includes checking your heartbeat and lungs with a stethoscope. Your healthcare provider may also check your skin to look for sun damage. • Screening tests  may be recommended. A screening test is done to check for diseases that may not cause symptoms. The screening tests you may need depend on your age, gender, family history, and lifestyle habits.  For example, colorectal screening may be recommended if you are 50 years old or older. Screening tests you need if you are a woman:   • A Pap smear  is used to screen for cervical cancer. Pap smears are usually done every 3 to 5 years depending on your age. You may need them more often if you have had abnormal Pap smear test results in the past. Ask your healthcare provider how often you should have a Pap smear. • A mammogram  is an x-ray of your breasts to screen for breast cancer. Experts recommend mammograms every 2 years starting at age 48 years. You may need a mammogram at age 52 years or younger if you have an increased risk for breast cancer. Talk to your healthcare provider about when you should start having mammograms and how often you need them. Vaccines you may need:   • Get an influenza vaccine  every year. The influenza vaccine protects you from the flu. Several types of viruses cause the flu. The viruses change over time, so new vaccines are made each year. • Get a tetanus-diphtheria (Td) booster vaccine  every 10 years. This vaccine protects you against tetanus and diphtheria. Tetanus is a severe infection that may cause painful muscle spasms and lockjaw. Diphtheria is a severe bacterial infection that causes a thick covering in the back of your mouth and throat. • Get a human papillomavirus (HPV) vaccine  if you are female and aged 23 to 32 or male 23 to 24 and never received it. This vaccine protects you from HPV infection. HPV is the most common infection spread by sexual contact. HPV may also cause vaginal, penile, and anal cancers. • Get a pneumococcal vaccine  if you are aged 72 years or older. The pneumococcal vaccine is an injection given to protect you from pneumococcal disease. Pneumococcal disease is an infection caused by pneumococcal bacteria. The infection may cause pneumonia, meningitis, or an ear infection. • Get a shingles vaccine  if you are 60 or older, even if you have had shingles before.  The shingles vaccine is an injection to protect you from the varicella-zoster virus. This is the same virus that causes chickenpox. Shingles is a painful rash that develops in people who had chickenpox or have been exposed to the virus. How to eat healthy:  My Plate is a model for planning healthy meals. It shows the types and amounts of foods that should go on your plate. Fruits and vegetables make up about half of your plate, and grains and protein make up the other half. A serving of dairy is included on the side of your plate. The amount of calories and serving sizes you need depends on your age, gender, weight, and height. Examples of healthy foods are listed below:  • Eat a variety of vegetables  such as dark green, red, and orange vegetables. You can also include canned vegetables low in sodium (salt) and frozen vegetables without added butter or sauces. • Eat a variety of fresh fruits , canned fruit in 100% juice, frozen fruit, and dried fruit. • Include whole grains. At least half of the grains you eat should be whole grains. Examples include whole-wheat bread, wheat pasta, brown rice, and whole-grain cereals such as oatmeal.    • Eat a variety of protein foods such as seafood (fish and shellfish), lean meat, and poultry without skin (turkey and chicken). Examples of lean meats include pork leg, shoulder, or tenderloin, and beef round, sirloin, tenderloin, and extra lean ground beef. Other protein foods include eggs and egg substitutes, beans, peas, soy products, nuts, and seeds. • Choose low-fat dairy products such as skim or 1% milk or low-fat yogurt, cheese, and cottage cheese. • Limit unhealthy fats  such as butter, hard margarine, and shortening. Exercise:  Exercise at least 30 minutes per day on most days of the week. Some examples of exercise include walking, biking, dancing, and swimming. You can also fit in more physical activity by taking the stairs instead of the elevator or parking farther away from stores. Include muscle strengthening activities 2 days each week. Regular exercise provides many health benefits. It helps you manage your weight, and decreases your risk for type 2 diabetes, heart disease, stroke, and high blood pressure. Exercise can also help improve your mood. Ask your healthcare provider about the best exercise plan for you. General health and safety guidelines:   • Do not smoke. Nicotine and other chemicals in cigarettes and cigars can cause lung damage. Ask your healthcare provider for information if you currently smoke and need help to quit. E-cigarettes or smokeless tobacco still contain nicotine. Talk to your healthcare provider before you use these products. • Limit alcohol. A drink of alcohol is 12 ounces of beer, 5 ounces of wine, or 1½ ounces of liquor. • Lose weight, if needed. Being overweight increases your risk of certain health conditions. These include heart disease, high blood pressure, type 2 diabetes, and certain types of cancer. • Protect your skin. Do not sunbathe or use tanning beds. Use sunscreen with a SPF 15 or higher. Apply sunscreen at least 15 minutes before you go outside. Reapply sunscreen every 2 hours. Wear protective clothing, hats, and sunglasses when you are outside. • Drive safely. Always wear your seatbelt. Make sure everyone in your car wears a seatbelt. A seatbelt can save your life if you are in an accident. Do not use your cell phone when you are driving. This could distract you and cause an accident. Pull over if you need to make a call or send a text message. • Practice safe sex. Use latex condoms if are sexually active and have more than one partner. Your healthcare provider may recommend screening tests for sexually transmitted infections (STIs). • Wear helmets, lifejackets, and protective gear. Always wear a helmet when you ride a bike or motorcycle, go skiing, or play sports that could cause a head injury.  Wear protective equipment when you play sports. Wear a lifejacket when you are on a boat or doing water sports. © Copyright Alana Delgado 2022 Information is for End User's use only and may not be sold, redistributed or otherwise used for commercial purposes. The above information is an  only. It is not intended as medical advice for individual conditions or treatments. Talk to your doctor, nurse or pharmacist before following any medical regimen to see if it is safe and effective for you.

## 2023-08-01 DIAGNOSIS — E55.9 HYPOVITAMINOSIS D: Primary | ICD-10-CM

## 2023-08-01 LAB — CCP AB SER IA-ACNC: 1.3

## 2023-08-01 RX ORDER — ERGOCALCIFEROL 1.25 MG/1
50000 CAPSULE ORAL 2 TIMES WEEKLY
Qty: 16 CAPSULE | Refills: 0 | Status: SHIPPED | OUTPATIENT
Start: 2023-08-03 | End: 2023-09-26

## 2023-08-02 NOTE — RESULT ENCOUNTER NOTE
Called patient but no answer, left detailed message informing him of low Vit D and script has been sent to the pharmacy

## 2023-08-07 ENCOUNTER — OFFICE VISIT (OUTPATIENT)
Dept: OBGYN CLINIC | Facility: CLINIC | Age: 38
End: 2023-08-07
Payer: COMMERCIAL

## 2023-08-07 VITALS
SYSTOLIC BLOOD PRESSURE: 125 MMHG | HEART RATE: 80 BPM | WEIGHT: 223 LBS | OXYGEN SATURATION: 99 % | DIASTOLIC BLOOD PRESSURE: 86 MMHG | RESPIRATION RATE: 18 BRPM | HEIGHT: 70 IN | BODY MASS INDEX: 31.92 KG/M2

## 2023-08-07 DIAGNOSIS — M51.36 DEGENERATIVE DISC DISEASE, LUMBAR: Primary | ICD-10-CM

## 2023-08-07 DIAGNOSIS — M53.3 SI (SACROILIAC) JOINT DYSFUNCTION: ICD-10-CM

## 2023-08-07 PROCEDURE — 99204 OFFICE O/P NEW MOD 45 MIN: CPT | Performed by: FAMILY MEDICINE

## 2023-08-07 RX ORDER — MELOXICAM 7.5 MG/1
7.5 TABLET ORAL DAILY
Qty: 30 TABLET | Refills: 0 | Status: SHIPPED | OUTPATIENT
Start: 2023-08-07

## 2023-08-07 NOTE — PROGRESS NOTES
Assessment/Plan:    No problem-specific Assessment & Plan notes found for this encounter. Diagnoses and all orders for this visit:    Degenerative disc disease, lumbar  -     Ambulatory Referral to Sports Medicine  -     Ambulatory Referral to Pain Management; Future  -     meloxicam (Mobic) 7.5 mg tablet; Take 1 tablet (7.5 mg total) by mouth daily    SI (sacroiliac) joint dysfunction  -     Ambulatory Referral to Pain Management; Future  -     meloxicam (Mobic) 7.5 mg tablet; Take 1 tablet (7.5 mg total) by mouth daily     Prior radiographs were reviewed which revealed degenerative changes and grade 1 spondylolisthesis of L5 on S1. Clinical impression is that patient's lower back pain symptoms likely arising from underlying degenerative disc disease and associated SI joint dysfunction. He does not have any neurologic deficits or weakness on exam.  He does have some sensory deficits which is unclear if related to lumbar etiology or due to chronic neuropathy from uncontrolled diabetes. I am recommending the patient continue with physical therapy. He has only completed about 2 sessions and reports no improvement in terms of pain symptoms. I did discuss that would recommend at least 8 weeks of physical therapy for his current lumbar pathology however he declines as he feels that physical therapy is a waste of time. I did discuss with patient that we can have him see spine pain for possible SI joint injections although I did discuss that they may want him to begin with physical therapy and may not want to proceed with injection until his A1c level is improved. He would like to proceed with referral for spine and pain. Referral was placed for initial consultation. Return precautions provided. Subjective:      Patient ID: Allyn Duffy is a 41 y.o. male presenting for initial evaluation consultation for lower back pain. Patient was referred by Paco Urena.   Patient states that the symptoms have been ongoing for the past several months without any inciting injury. He states that the pain is focalized more so on the lower back and right side of the neck. He is attended 2 physical therapy sessions and has been doing home exercises taught by PT without any improvement. He is presenting today for initial evaluation. He denies any bowel or bladder incontinence. He does report occasional numbness and tingling in the foot and leg and does state that he has ongoing neuropathy from uncontrolled diabetes. His last A1c level was 13.1          HPI    The following portions of the patient's history were reviewed and updated as appropriate: allergies, current medications, past family history, past medical history, past social history, past surgical history and problem list.    Review of Systems      Objective:      /86   Pulse 80   Resp 18   Ht 5' 9.69" (1.77 m)   Wt 101 kg (223 lb)   SpO2 99%   BMI 32.28 kg/m²          Physical Exam    No acute distress, calm and cooperative, alert and oriented x 3  Skin: No gross abnormalities no erythema or warmth no open wounds  Lumbar spine:  Full flexion extension right and left rotation. Referred pain with extension and flexion based motion  Tenderness to palpation over the paraspinal right side at the L3-L5 region  No spinous process tenderness to palpation of the lumbar spine  He has tenderness to palpation over the right SI joint  Positive Herman Kellen  Negative stork  Negative slump  Negative straight leg raise  5 out of 5 strength with hip flexion knee extension ankle dorsiflexion  Able to perform heel and toe walk  There are sensory deficits bilaterally in the L3-L5 distribution.

## 2023-10-05 NOTE — PROGRESS NOTES
Assessment:  1. Lumbar radiculopathy    2. Chronic right-sided low back pain with right-sided sciatica    3. Spondylolysis of lumbar region        Plan:  Orders Placed This Encounter   Procedures   • MRI lumbar spine without contrast     Standing Status:   Future     Standing Expiration Date:   10/9/2027     Scheduling Instructions: There is no preparation for this test. Please leave your jewelry and valuables at home, wedding rings are the exception. All patients will be required to change into a hospital gown and pants. Street clothes are not permitted in the MRI. Magnetic nail polish must be removed prior to arrival for your test. Please bring your insurance cards, a form of photo ID and a list of your medications with you. Arrive 15 minutes prior to your appointment time in order to register. Please bring any prior CT or MRI studies of this area that were not performed at a [facility]. To schedule this appointment, please contact Central Scheduling at [phone]. Prior to your appointment, please make sure you complete the MRI Screening Form when you e-Check in for your appointment. This will be available starting 7 days before your appointment in [address] [place] [address]. You may receive an e-mail with an activation code if you do not have a [employer] account. If you do not have access to a device, we will complete your screening at your appointment. Order Specific Question:   What is the patient's sedation requirement? If Medication for Claustrophobia is selected, order medication at this point. Answer:   No Sedation     Order Specific Question:   Does this procedure require the 3T MRI at [place] or [place]?     Answer:   No     Order Specific Question:   Release to patient through [employer]     Answer:   Immediate     Order Specific Question:   Is order priority selected as STAT?      Answer:   No     Order Specific Question:   Reason for Exam (FREE TEXT)     Answer:   lumbar radiculopathy L5 right       New Medications Ordered This Visit   Medications   • gabapentin (NEURONTIN) 300 mg capsule     Sig: Take 1 capsule (300 mg total) by mouth 3 (three) times a day Start by taking 1 tablet at bedtime for 3 days. Then increase to 1 tablet in the evening and 1 tablet at bedtime for 3 days. Then increase to 1 tablet in the morning, 1 tablet in the evening, and 1 tablet at bedtime thereafter. Dispense:  90 capsule     Refill:  0       My impressions and treatment recommendations were discussed in detail with the patient, who verbalized understanding and had no further questions. This is a 50-year-old male who presents her office chief complaint of right-sided low back pain with radiation down the right lower extremity will closely resembles an L5 dermatome. On his x-ray, he has notable spondylolysis of L5 with secondary spondylolisthesis. Differential diagnosis for his symptoms includes lumbar radiculopathy secondary to possible foraminal compression due to anterolisthesis. He also has component of axial pain given positive facet loading to the right. I am going to order MRI of the lumbar spine to assess for any significant compressive pathology. This will help to determine next course of action which may include epidural steroid injection. Notably, patient's A1c is 13%. I did discuss risks of steroid administration during injection. He needs to have his blood sugar more aggressively managed. We also going to trial the patient on gabapentin 300 milligrams 3 times daily given history of probable diabetic neuropathy and lumbar radiculopathy. The patient was cautioned about possible side effects of gabapentin to include sedation, swelling, mood alterations and dizziness.         [place] Prescription Drug Monitoring Program report was reviewed and was appropriate     Complete risks and benefits including bleeding, infection, tissue reaction, nerve injury and allergic reaction were discussed. The approach was demonstrated using models and literature was provided. Verbal and written consent was obtained. Discharge instructions were provided. I personally saw and examined the patient and I agree with the above discussed plan of care. History of Present Illness:    Sivakumar Dutta is a 41 y.o. male who presents to [address] [place] [address] [facility] for initial evaluation of the above stated pain complaints. The patient has a past medical and chronic pain history as outlined in the assessment section. He was referred by Enriqueta Oleary DO  [address] [place] [address] . Patient is here with chief complaint of lower back pain. Moderate to severe over the past week. 8 out of 10. Constant. No typical pattern. Described as shooting, sharp, pins-and-needles. Patient reports pain primarily in the right lumbar region with radiation down the right lateral thigh, past the knee into the lateral calf and into the dorsum of the foot. Denies dragging the foot with ambulation. No change with standing, bending, sitting, walking. Increased with sneezing. X-ray imaging showing spondylolysis and grade 1 spondylolisthesis of L5 on S1. Has history of poorly controlled diabetes with A1c greater than 13. No relief with physical therapy or heat/ice therapy. Physical therapy is a waste of time. No tobacco or marijuana use. No alcohol use. Not on blood thinners. Not allergic to latex or contrast dye      Review of Systems:    Review of Systems   Eyes: Positive for visual disturbance. Cardiovascular: Positive for leg swelling. Gastrointestinal: Positive for abdominal pain. Endocrine: Positive for polyphagia. Musculoskeletal: Positive for arthralgias. Neurological: Positive for numbness. History reviewed. No pertinent past medical history.     Past Surgical History:   Procedure Laterality Date   • BACK SURGERY         Family History   Family history unknown: Yes       Social History     Occupational History   • Not on file   Tobacco Use   • Smoking status: Never   • Smokeless tobacco: Never   Vaping Use   • Vaping Use: Never used   Substance and Sexual Activity   • Alcohol use: Not Currently   • Drug use: Never   • Sexual activity: Yes     Partners: Male         Current Outpatient Medications:   •  Blood Glucose Monitoring Suppl (OneTouch Verio Reflect) w/Device KIT, Check blood sugars once daily. Please substitute with appropriate alternative as covered by patient's insurance. Dx: E11.65, Disp: 1 kit, Rfl: 0  •  dulaglutide (Trulicity) 0.24 RX/6.2AP injection, Inject 0.5 mL (0.75 mg total) under the skin every 7 days, Disp: 6 mL, Rfl: 1  •  gabapentin (NEURONTIN) 300 mg capsule, Take 1 capsule (300 mg total) by mouth 3 (three) times a day Start by taking 1 tablet at bedtime for 3 days. Then increase to 1 tablet in the evening and 1 tablet at bedtime for 3 days. Then increase to 1 tablet in the morning, 1 tablet in the evening, and 1 tablet at bedtime thereafter., Disp: 90 capsule, Rfl: 0  •  glucose blood (OneTouch Verio) test strip, Check blood sugars once daily. Please substitute with appropriate alternative as covered by patient's insurance. Dx: E11.65, Disp: 100 each, Rfl: 3  •  insulin degludec Abbey Deng FlexTouch) 100 units/mL injection pen, Inject 20 Units under the skin daily, Disp: 15 mL, Rfl: 3  •  Insulin Pen Needle (BD Pen Needle Margo U/F) 32G X 4 MM MISC, Use daily as directed with insulin pen, Disp: 100 each, Rfl: 3  •  OneTouch Delica Lancets 27L MISC, Check blood sugars once daily. Please substitute with appropriate alternative as covered by patient's insurance.  Dx: E11.65, Disp: 100 each, Rfl: 3  •  cholecalciferol (VITAMIN D3) 1,000 units tablet, Take 2 tablets (2,000 Units total) by mouth daily for 3 days (Patient not taking: Reported on 8/7/2023), Disp: 6 tablet, Rfl: 0  •  cyclobenzaprine (FLEXERIL) 5 mg tablet, Take 1 tablet (5 mg total) by mouth 3 (three) times a day as needed for muscle spasms (Patient not taking: Reported on 10/9/2023), Disp: 30 tablet, Rfl: 0  •  Diclofenac Sodium (VOLTAREN) 1 %, Apply 2 g topically 4 (four) times a day (Patient not taking: Reported on 10/9/2023), Disp: 100 g, Rfl: 0  •  ergocalciferol (VITAMIN D2) 50,000 units, Take 1 capsule (50,000 Units total) by mouth 2 (two) times a week for 16 doses, Disp: 16 capsule, Rfl: 0  •  lisinopril (ZESTRIL) 10 mg tablet, Take 1 tablet (10 mg total) by mouth daily (Patient not taking: Reported on 10/9/2023), Disp: 90 tablet, Rfl: 3  •  meloxicam (Mobic) 7.5 mg tablet, Take 1 tablet (7.5 mg total) by mouth daily (Patient not taking: Reported on 10/9/2023), Disp: 30 tablet, Rfl: 0  •  metFORMIN (GLUCOPHAGE-XR) 500 mg 24 hr tablet, Take 2 tablets (1,000 mg total) by mouth daily with breakfast (Patient not taking: Reported on 10/9/2023), Disp: 180 tablet, Rfl: 1  •  mometasone (ELOCON) 0.1 % cream, Apply topically daily (Patient not taking: Reported on 10/9/2023), Disp: 45 g, Rfl: 0    No Known Allergies    Physical Exam:    BP (!) 144/101 Comment: Pt. ran out of BP meds 2 weeks ago. Pulse 88   Ht 5' 9" (1.753 m)   Wt 103 kg (228 lb)   BMI 33.67 kg/m²     Constitutional: normal, well developed, well nourished, alert, in no distress and non-toxic and no overt pain behavior.   Eyes: anicteric  HEENT: grossly intact  Neck: supple, symmetric, trachea midline and no masses   Pulmonary:even and unlabored  Cardiovascular:No edema or pitting edema present  Skin:Normal without rashes or lesions and well hydrated  Psychiatric:Mood and affect appropriate  Neurologic:Cranial Nerves II-XII grossly intact  Musculoskeletal:normal     Lumbar Spine Exam    Appearance:  Normal lordosis  Palpation/Tenderness:  no tenderness or spasm  Sensory:  no sensory deficits noted  Motor Strength:  Left hip flexion:  5/5  Left hip extension:  5/5  Right hip flexion:  5/5  Right hip extension: 5/5  Left knee flexion:  5/5  Left knee extension:  5/5  Right knee flexion:  5/5  Right knee extension:  5/5  Left foot dorsiflexion:  5/5  Left foot plantar flexion:  5/5  Right foot dorsiflexion:  5/5  Right foot plantar flexion:  5/5  Reflexes:  Left Patellar:  1+   Right Patellar:  1+   Left Achilles:  1+   Right Achilles:  1+   Special Tests:  Facet loading positive on the right. Imaging    LUMBAR SPINE  7/14/23     INDICATION:   M54.50: Low back pain, unspecified  G89.29: Other chronic pain.     COMPARISON:  None     VIEWS:  XR SPINE LUMBAR MINIMUM 4 VIEWS NON INJURY        FINDINGS:     There are 5 non rib bearing lumbar vertebral bodies.     There is no evidence of acute fracture or destructive osseous lesion.     Thoracolumbar asymmetry.     Mild degenerative disc disease of L4-5.     L5 spondylolysis bilaterally. Grade 1 spondylolisthesis at L5-S1.     Soft tissues are unremarkable.     IMPRESSION:     Bilateral L5 spondylolysis and grade 1 spondylolisthesis at L5-S1.     Mild degenerative disc disease of L4-5.     Resident: Doreen Briscoe I, the attending radiologist, have reviewed the images and agree with the final report above.   MRI lumbar spine without contrast    (Results Pending)       Orders Placed This Encounter   Procedures   • MRI lumbar spine without contrast

## 2023-10-09 ENCOUNTER — CONSULT (OUTPATIENT)
Dept: PAIN MEDICINE | Facility: CLINIC | Age: 38
End: 2023-10-09
Payer: COMMERCIAL

## 2023-10-09 VITALS
HEART RATE: 88 BPM | BODY MASS INDEX: 33.77 KG/M2 | HEIGHT: 69 IN | WEIGHT: 228 LBS | DIASTOLIC BLOOD PRESSURE: 101 MMHG | SYSTOLIC BLOOD PRESSURE: 144 MMHG

## 2023-10-09 DIAGNOSIS — M54.41 CHRONIC RIGHT-SIDED LOW BACK PAIN WITH RIGHT-SIDED SCIATICA: ICD-10-CM

## 2023-10-09 DIAGNOSIS — G89.29 CHRONIC RIGHT-SIDED LOW BACK PAIN WITH RIGHT-SIDED SCIATICA: ICD-10-CM

## 2023-10-09 DIAGNOSIS — M43.06 SPONDYLOLYSIS OF LUMBAR REGION: ICD-10-CM

## 2023-10-09 DIAGNOSIS — M54.16 LUMBAR RADICULOPATHY: Primary | ICD-10-CM

## 2023-10-09 PROCEDURE — 99204 OFFICE O/P NEW MOD 45 MIN: CPT | Performed by: STUDENT IN AN ORGANIZED HEALTH CARE EDUCATION/TRAINING PROGRAM

## 2023-10-09 RX ORDER — GABAPENTIN 300 MG/1
300 CAPSULE ORAL 3 TIMES DAILY
Qty: 90 CAPSULE | Refills: 0 | Status: SHIPPED | OUTPATIENT
Start: 2023-10-09 | End: 2023-10-19 | Stop reason: SDUPTHER

## 2023-10-09 NOTE — PATIENT INSTRUCTIONS
Epidural Steroid Injection   AMBULATORY CARE:   What you need to know about an epidural steroid injection (ABIGAIL):  An ABIGAIL is a procedure to inject steroid medicine into the epidural space. The epidural space is between your spinal cord and vertebrae. Steroids reduce inflammation and fluid buildup in your spine that may be causing pain. You may be given pain medicine along with the steroids. How to prepare for an ABIGAIL:  Your provider will talk to you about how to prepare for your procedure. He or she will tell you what medicines to take or not take on the day of your procedure. You may need to stop taking blood thinners or other medicines several days before your procedure. You may need to adjust any diabetes medicine you take on the day of your procedure. Steroid medicine can increase your blood sugar level. Arrange for someone to drive you home when you are discharged. What will happen during an ABIGAIL:   You will be given medicine to numb the procedure area. You will be awake for the procedure, but you will not feel pain. You may also be given medicine to help you relax. Contrast liquid will be used to help your provider see the area better. Tell the provider if you have ever had an allergic reaction to contrast liquid. Your provider may place the needle into your neck area, middle of your back, or tailbone area. He or she may inject the medicine next to the nerves that are causing your pain. He or she may instead inject the medicine into a larger area of the epidural space. This helps the medicine spread to more nerves. Your provider will use a fluoroscope to help guide the needle to the right place. A fluoroscope is a type of x-ray. After the procedure, a bandage will be placed over the injection site to prevent infection. What will happen after an ABIGAIL:  You will have a bandage over the injection site to prevent infection. Your provider will tell you when you can bathe and any activity guidelines.  You will be able to go home. Risks of an ABIGAIL:  You may have temporary or permanent nerve damage or paralysis. You may have bleeding or develop a serious infection, such as meningitis (swelling of the brain coverings). An abscess may also develop. An abscess is a pus-filled area under the skin. You may need surgery to fix the abscess. You may have a seizure, anxiety, or trouble sleeping. If you are a man, you may have temporary erectile dysfunction (not able to have an erection). Call your local emergency number (911 in the [address]) if:   You have a seizure. You have trouble moving your legs. Seek care immediately if:   Blood soaks through your bandage. You have a fever or chills, severe back pain, and the procedure area is sensitive to the touch. You cannot control when you urinate or have a bowel movement. Call your doctor if:   You have weakness or numbness in your legs. Your wound is red, swollen, or draining pus. You have nausea or are vomiting. Your face or neck is red and you feel warm. You have more pain than you had before the procedure. You have swelling in your hands or feet. You have questions or concerns about your condition or care. Care for your wound as directed: You may remove the bandage before you go to bed the day of your procedure. You may take a shower, but do not take a bath for at least 24 hours. Self-care:   Do not drive,  use machines, or do strenuous activity for 24 hours after your procedure or as directed. Continue other treatments  as directed. Steroid injections alone will not control your pain. The injections are meant to be used with other treatments, such as physical therapy. Follow up with your doctor as directed:  Write down your questions so you remember to ask them during your visits. © Copyright [employer] 2023 Information is for End User's use only and may not be sold, redistributed or otherwise used for commercial purposes.   The above information is an  only. It is not intended as medical advice for individual conditions or treatments. Talk to your doctor, nurse or pharmacist before following any medical regimen to see if it is safe and effective for you.

## 2023-10-19 DIAGNOSIS — E11.59 TYPE 2 DIABETES MELLITUS WITH OTHER CIRCULATORY COMPLICATION, WITH LONG-TERM CURRENT USE OF INSULIN (HCC): ICD-10-CM

## 2023-10-19 DIAGNOSIS — L30.9 ECZEMA, UNSPECIFIED TYPE: ICD-10-CM

## 2023-10-19 DIAGNOSIS — M54.16 LUMBAR RADICULOPATHY: ICD-10-CM

## 2023-10-19 DIAGNOSIS — M53.3 SI (SACROILIAC) JOINT DYSFUNCTION: ICD-10-CM

## 2023-10-19 DIAGNOSIS — E11.9 TYPE 2 DIABETES MELLITUS WITHOUT COMPLICATION, WITH LONG-TERM CURRENT USE OF INSULIN (HCC): ICD-10-CM

## 2023-10-19 DIAGNOSIS — M51.36 DEGENERATIVE DISC DISEASE, LUMBAR: ICD-10-CM

## 2023-10-19 DIAGNOSIS — Z79.4 TYPE 2 DIABETES MELLITUS WITH OTHER CIRCULATORY COMPLICATION, WITH LONG-TERM CURRENT USE OF INSULIN (HCC): ICD-10-CM

## 2023-10-19 DIAGNOSIS — Z79.4 TYPE 2 DIABETES MELLITUS WITHOUT COMPLICATION, WITH LONG-TERM CURRENT USE OF INSULIN (HCC): ICD-10-CM

## 2023-10-19 DIAGNOSIS — E11.59 TYPE 2 DIABETES MELLITUS WITH OTHER CIRCULATORY COMPLICATION, WITHOUT LONG-TERM CURRENT USE OF INSULIN (HCC): ICD-10-CM

## 2023-10-19 RX ORDER — MELOXICAM 7.5 MG/1
7.5 TABLET ORAL DAILY
Qty: 90 TABLET | Refills: 1 | Status: SHIPPED | OUTPATIENT
Start: 2023-10-19

## 2023-10-19 RX ORDER — LISINOPRIL 10 MG/1
10 TABLET ORAL DAILY
Qty: 90 TABLET | Refills: 3 | Status: SHIPPED | OUTPATIENT
Start: 2023-10-19

## 2023-10-19 RX ORDER — DULAGLUTIDE 0.75 MG/.5ML
0.75 INJECTION, SOLUTION SUBCUTANEOUS
Qty: 6 ML | Refills: 3 | Status: SHIPPED | OUTPATIENT
Start: 2023-10-19

## 2023-10-19 RX ORDER — METFORMIN HYDROCHLORIDE 500 MG/1
1000 TABLET, EXTENDED RELEASE ORAL
Qty: 180 TABLET | Refills: 3 | Status: SHIPPED | OUTPATIENT
Start: 2023-10-19 | End: 2024-04-16

## 2023-10-19 RX ORDER — MOMETASONE FUROATE 1 MG/G
CREAM TOPICAL DAILY
Qty: 45 G | Refills: 1 | Status: SHIPPED | OUTPATIENT
Start: 2023-10-19

## 2023-10-19 RX ORDER — BLOOD-GLUCOSE METER
EACH MISCELLANEOUS
Qty: 100 EACH | Refills: 3 | Status: SHIPPED | OUTPATIENT
Start: 2023-10-19

## 2023-10-19 RX ORDER — PEN NEEDLE, DIABETIC 32GX 5/32"
NEEDLE, DISPOSABLE MISCELLANEOUS
Qty: 100 EACH | Refills: 3 | Status: SHIPPED | OUTPATIENT
Start: 2023-10-19

## 2023-10-19 RX ORDER — GABAPENTIN 300 MG/1
300 CAPSULE ORAL 3 TIMES DAILY
Qty: 90 CAPSULE | Refills: 3 | Status: SHIPPED | OUTPATIENT
Start: 2023-10-19

## 2023-10-20 DIAGNOSIS — E11.9 TYPE 2 DIABETES MELLITUS WITHOUT COMPLICATION, WITH LONG-TERM CURRENT USE OF INSULIN (HCC): ICD-10-CM

## 2023-10-20 DIAGNOSIS — Z79.4 TYPE 2 DIABETES MELLITUS WITHOUT COMPLICATION, WITH LONG-TERM CURRENT USE OF INSULIN (HCC): ICD-10-CM

## 2023-10-20 RX ORDER — INSULIN DEGLUDEC INJECTION 100 U/ML
20 INJECTION, SOLUTION SUBCUTANEOUS DAILY
Qty: 15 ML | Refills: 3 | Status: SHIPPED | OUTPATIENT
Start: 2023-10-20

## 2023-10-25 DIAGNOSIS — E11.42 DIABETIC POLYNEUROPATHY ASSOCIATED WITH TYPE 2 DIABETES MELLITUS (HCC): Primary | ICD-10-CM

## 2023-10-25 RX ORDER — GABAPENTIN 300 MG/1
300 CAPSULE ORAL 3 TIMES DAILY
Qty: 270 CAPSULE | Refills: 0 | Status: SHIPPED | OUTPATIENT
Start: 2023-10-25 | End: 2023-12-07 | Stop reason: SDUPTHER

## 2023-10-31 ENCOUNTER — APPOINTMENT (OUTPATIENT)
Dept: RADIOLOGY | Facility: CLINIC | Age: 38
End: 2023-10-31
Payer: COMMERCIAL

## 2023-10-31 ENCOUNTER — OFFICE VISIT (OUTPATIENT)
Dept: FAMILY MEDICINE CLINIC | Facility: CLINIC | Age: 38
End: 2023-10-31
Payer: COMMERCIAL

## 2023-10-31 ENCOUNTER — HOSPITAL ENCOUNTER (OUTPATIENT)
Dept: MRI IMAGING | Facility: CLINIC | Age: 38
Discharge: HOME | End: 2023-10-31
Payer: COMMERCIAL

## 2023-10-31 VITALS
OXYGEN SATURATION: 98 % | HEART RATE: 96 BPM | RESPIRATION RATE: 16 BRPM | SYSTOLIC BLOOD PRESSURE: 146 MMHG | HEIGHT: 70 IN | WEIGHT: 228.8 LBS | BODY MASS INDEX: 32.75 KG/M2 | DIASTOLIC BLOOD PRESSURE: 100 MMHG | TEMPERATURE: 97.7 F

## 2023-10-31 DIAGNOSIS — E11.59 TYPE 2 DIABETES MELLITUS WITH OTHER CIRCULATORY COMPLICATION, WITH LONG-TERM CURRENT USE OF INSULIN (HCC): ICD-10-CM

## 2023-10-31 DIAGNOSIS — G89.29 CHRONIC RIGHT-SIDED LOW BACK PAIN WITH RIGHT-SIDED SCIATICA: ICD-10-CM

## 2023-10-31 DIAGNOSIS — Z79.4 TYPE 2 DIABETES MELLITUS WITHOUT COMPLICATION, WITH LONG-TERM CURRENT USE OF INSULIN (HCC): Primary | ICD-10-CM

## 2023-10-31 DIAGNOSIS — Z79.4 TYPE 2 DIABETES MELLITUS WITH DIABETIC POLYNEUROPATHY, WITH LONG-TERM CURRENT USE OF INSULIN (HCC): ICD-10-CM

## 2023-10-31 DIAGNOSIS — E11.9 TYPE 2 DIABETES MELLITUS WITHOUT COMPLICATION, WITH LONG-TERM CURRENT USE OF INSULIN (HCC): Primary | ICD-10-CM

## 2023-10-31 DIAGNOSIS — M54.50 CHRONIC BILATERAL LOW BACK PAIN, UNSPECIFIED WHETHER SCIATICA PRESENT: ICD-10-CM

## 2023-10-31 DIAGNOSIS — I15.2 HYPERTENSION DUE TO ENDOCRINE DISORDER: ICD-10-CM

## 2023-10-31 DIAGNOSIS — E11.42 TYPE 2 DIABETES MELLITUS WITH DIABETIC POLYNEUROPATHY, WITH LONG-TERM CURRENT USE OF INSULIN (HCC): ICD-10-CM

## 2023-10-31 DIAGNOSIS — M54.16 LUMBAR RADICULOPATHY: ICD-10-CM

## 2023-10-31 DIAGNOSIS — G89.29 CHRONIC BILATERAL LOW BACK PAIN, UNSPECIFIED WHETHER SCIATICA PRESENT: ICD-10-CM

## 2023-10-31 DIAGNOSIS — M25.511 ACUTE PAIN OF RIGHT SHOULDER: ICD-10-CM

## 2023-10-31 DIAGNOSIS — M54.41 CHRONIC RIGHT-SIDED LOW BACK PAIN WITH RIGHT-SIDED SCIATICA: ICD-10-CM

## 2023-10-31 DIAGNOSIS — Z79.4 TYPE 2 DIABETES MELLITUS WITH OTHER CIRCULATORY COMPLICATION, WITH LONG-TERM CURRENT USE OF INSULIN (HCC): ICD-10-CM

## 2023-10-31 LAB — SL AMB POCT HEMOGLOBIN AIC: 8.6 (ref ?–6.5)

## 2023-10-31 PROCEDURE — 83036 HEMOGLOBIN GLYCOSYLATED A1C: CPT | Performed by: NURSE PRACTITIONER

## 2023-10-31 PROCEDURE — 99214 OFFICE O/P EST MOD 30 MIN: CPT | Performed by: NURSE PRACTITIONER

## 2023-10-31 PROCEDURE — 72148 MRI LUMBAR SPINE W/O DYE: CPT

## 2023-10-31 PROCEDURE — G1004 CDSM NDSC: HCPCS

## 2023-10-31 PROCEDURE — 73030 X-RAY EXAM OF SHOULDER: CPT

## 2023-10-31 PROCEDURE — 3052F HG A1C>EQUAL 8.0%<EQUAL 9.0%: CPT | Performed by: STUDENT IN AN ORGANIZED HEALTH CARE EDUCATION/TRAINING PROGRAM

## 2023-10-31 NOTE — ASSESSMENT & PLAN NOTE
Lab Results   Component Value Date    HGBA1C 8.6 (A) 10/31/2023   Patient's hemoglobin A1c is much improved from his last visit. We will continue medication. Will increase Trulicity dosage to 1.5. Patient reports he has 0.75 mg, has 3-month supply. Advised to use, take 2 injections. Continue with dietary changes. Discussed importance of self-care. Advised to take 2000 international units of vitamin D daily.

## 2023-10-31 NOTE — ASSESSMENT & PLAN NOTE
Right shoulder pain. Patient does continue to commute to [place] daily. Discussed maintaining good posture. Maintain safety. X-ray ordered. May use heat, muscle rub.   Voltaren gel ordered

## 2023-10-31 NOTE — ASSESSMENT & PLAN NOTE
Patient blood pressure is elevated in office. If that he has been up since 4 has been very busy. Did take his blood pressure medication. Will not make any adjustments at this time. Discussed self-care. Discussed heart healthy diet.   Patient is also in pain, right shoulder pain acute back pain

## 2023-10-31 NOTE — PROGRESS NOTES
Name: Brain Minors      : 1985      MRN: [id number]  Encounter Provider: PARUL Mckenzie  Encounter Date: 10/31/2023   Encounter department: [address] [facility]     1. Type 2 diabetes mellitus without complication, with long-term current use of insulin ([facility]ly [facility])  Assessment & Plan:    Lab Results   Component Value Date    HGBA1C 8.6 (A) 10/31/2023   Patient's hemoglobin A1c is much improved from his last visit. We will continue medication. Will increase Trulicity dosage to 1.5. Patient reports he has 0.75 mg, has 3-month supply. Advised to use, take 2 injections. Continue with dietary changes. Discussed importance of self-care. Advised to take 2000 international units of vitamin D daily. Orders:  -     POCT hemoglobin A1c  -     Comprehensive metabolic panel; Future; Expected date: 10/31/2023  -     CBC and differential; Future; Expected date: 10/31/2023  -     Lipid Panel with Direct LDL reflex; Future; Expected date: 10/31/2023  -     TSH, 3rd generation with Free T4 reflex; Future; Expected date: 10/31/2023    2. Acute pain of right shoulder  Assessment & Plan:  Right shoulder pain. Patient does continue to commute to [place] daily. Discussed maintaining good posture. Maintain safety. X-ray ordered. May use heat, muscle rub. Voltaren gel ordered    Orders:  -     XR shoulder 2+ vw right; Future; Expected date: 10/31/2023    3. Type 2 diabetes mellitus with other circulatory complication, with long-term current use of insulin (HCC)  Assessment & Plan:    Lab Results   Component Value Date    HGBA1C 8.6 (A) 10/31/2023   Patient's hemoglobin A1c is much improved from his last visit. We will continue medication. Will increase Trulicity dosage to 1.5. Patient reports he has 0.75 mg, has 3-month supply. Advised to use, take 2 injections. Continue with dietary changes. Discussed importance of self-care.   Advised to take 2000 international units of vitamin D daily. 4. Chronic bilateral low back pain, unspecified whether sciatica present  -     Diclofenac Sodium (VOLTAREN) 1 %; Apply 2 g topically 4 (four) times a day    5. Type 2 diabetes mellitus with diabetic polyneuropathy, with long-term current use of insulin ([facility])  Assessment & Plan:    Lab Results   Component Value Date    HGBA1C 8.6 (A) 10/31/2023   Patient's hemoglobin A1c is much improved from his last visit. We will continue medication. Will increase Trulicity dosage to 1.5. Patient reports he has 0.75 mg, has 3-month supply. Advised to use, take 2 injections. Continue with dietary changes. Discussed importance of self-care. Advised to take 2000 international units of vitamin D daily. 6. Hypertension due to endocrine disorder  Assessment & Plan:  Patient blood pressure is elevated in office. If that he has been up since 4 has been very busy. Did take his blood pressure medication. Will not make any adjustments at this time. Discussed self-care. Discussed heart healthy diet. Patient is also in pain, right shoulder pain acute back pain             Subjective      Patient is here for 3-month follow-up. Had MRI of his back done this morning. Reports acute right shoulder pain. Denies any injury. Was that he has been taking his medications. Blood pressure is slightly elevated in office. States that he has been up since 4:00, has been very busy. Has been taking his medication. He said gabapentin has been helping his legs. Blood sugar has been improving      Review of Systems   Constitutional: Negative. HENT: Negative. Eyes: Negative. Respiratory: Negative. Cardiovascular: Negative. Gastrointestinal: Negative. Endocrine: Negative. Genitourinary: Negative. Musculoskeletal:  Positive for arthralgias. Skin: Negative. Allergic/Immunologic: Negative. Neurological:  Positive for numbness (Bilateral lower extremities). Hematological: Negative. Psychiatric/Behavioral: Negative. Current Outpatient Medications on File Prior to Visit   Medication Sig   • Blood Glucose Monitoring Suppl (OneTouch Verio Reflect) w/Device KIT Check blood sugars once daily. Please substitute with appropriate alternative as covered by patient's insurance. Dx: E11.65   • dulaglutide (Trulicity) 4.16 AF/8.6CE injection Inject 0.5 mL (0.75 mg total) under the skin every 7 days   • gabapentin (NEURONTIN) 300 mg capsule Take 1 capsule (300 mg total) by mouth 3 (three) times a day   • glucose blood (OneTouch Verio) test strip Check blood sugars once daily. Please substitute with appropriate alternative as covered by patient's insurance. Dx: E11.65   • insulin degludec [place] [employer] FlexTouch) 100 units/mL injection pen Inject 20 Units under the skin daily   • Insulin Pen Needle (BD Pen Needle Margo U/F) 32G X 4 MM MISC Use daily as directed with insulin pen   • lisinopril (ZESTRIL) 10 mg tablet Take 1 tablet (10 mg total) by mouth daily   • meloxicam (Mobic) 7.5 mg tablet Take 1 tablet (7.5 mg total) by mouth daily   • metFORMIN (GLUCOPHAGE-XR) 500 mg 24 hr tablet Take 2 tablets (1,000 mg total) by mouth daily with breakfast   • mometasone (ELOCON) 0.1 % cream Apply topically daily   • OneTouch Delica Lancets 37Q MISC Check blood sugars once daily. Please substitute with appropriate alternative as covered by patient's insurance.  Dx: E11.65   • cholecalciferol (VITAMIN D3) 1,000 units tablet Take 2 tablets (2,000 Units total) by mouth daily for 3 days   • cyclobenzaprine (FLEXERIL) 5 mg tablet Take 1 tablet (5 mg total) by mouth 3 (three) times a day as needed for muscle spasms (Patient not taking: Reported on 10/9/2023)   • [DISCONTINUED] Diclofenac Sodium (VOLTAREN) 1 % Apply 2 g topically 4 (four) times a day (Patient not taking: Reported on 10/9/2023)   • [DISCONTINUED] ergocalciferol (VITAMIN D2) 50,000 units Take 1 capsule (50,000 Units total) by mouth 2 (two) times a week for 16 doses       Objective     /100   Pulse 96   Temp 97.7 °F (36.5 °C)   Resp 16   Ht 5' 9.69" (1.77 m)   Wt 104 kg (228 lb 12.8 oz)   SpO2 98%   BMI 33.12 kg/m²     Physical Exam  Vitals and nursing note reviewed. Constitutional:       Appearance: He is well-developed. He is obese. HENT:      Head: Normocephalic and atraumatic. Cardiovascular:      Rate and Rhythm: Normal rate and regular rhythm. Pulses: Normal pulses. Heart sounds: Normal heart sounds. Pulmonary:      Effort: Pulmonary effort is normal.      Breath sounds: Normal breath sounds. Abdominal:      Palpations: Abdomen is soft. Musculoskeletal:         General: Tenderness (Left shoulder, unable to complete full range of motion) present. No swelling. Normal range of motion. Skin:     General: Skin is warm and dry. Neurological:      General: No focal deficit present. Mental Status: He is alert and oriented to person, place, and time. Psychiatric:         Mood and Affect: Mood normal.         Behavior: Behavior normal.         Thought Content:  Thought content normal.         Judgment: Judgment normal.       PARUL Parkinson

## 2023-11-03 DIAGNOSIS — L30.9 ECZEMA, UNSPECIFIED TYPE: ICD-10-CM

## 2023-11-03 RX ORDER — MOMETASONE FUROATE 1 MG/G
CREAM TOPICAL DAILY
Qty: 50 G | Refills: 1 | Status: SHIPPED | OUTPATIENT
Start: 2023-11-03

## 2023-11-07 ENCOUNTER — TELEPHONE (OUTPATIENT)
Dept: RADIOLOGY | Facility: CLINIC | Age: 38
End: 2023-11-07

## 2023-11-07 DIAGNOSIS — M54.16 LUMBAR RADICULOPATHY: Primary | ICD-10-CM

## 2023-11-07 NOTE — TELEPHONE ENCOUNTER
Caller: Jo Hermosillo     Doctor/Office: Dr Randye Maxon     Call regarding :  RN     Call was transferred to: RN

## 2023-11-07 NOTE — TELEPHONE ENCOUNTER
----- Message from Nasra Rae MD sent at 11/7/2023  2:57 PM EST -----  MRI shows some degenerative changes in the joints in the lower back. He has evidence of herniated discs in the lower two levels but there does not seem to be a clear nerve compression on the MRI to explain the pain down the right leg. It may be best to have him undergo EMG of the right leg to see if there is chronic nerve damage from his back that we cannot visualize. He was also started on gabapentin at [facility]. If not having relief but no side effects, may be able to increase this.      Order placed for EMG

## 2023-11-07 NOTE — TELEPHONE ENCOUNTER
FYI:  S/W pt and advised of results and order for EMG  Advised pt to call [facility], number given    Pt states he has only been taking Gabapentin 300mg once per day  Advised pt he should be taking TID  Pt will increase

## 2023-11-08 ENCOUNTER — TELEPHONE (OUTPATIENT)
Dept: FAMILY MEDICINE CLINIC | Facility: CLINIC | Age: 38
End: 2023-11-08

## 2023-11-08 NOTE — TELEPHONE ENCOUNTER
----- Message from [address] [place] [address] sent at 11/7/2023  5:31 PM EST -----  Normal x-ray. May use heat, muscle rub, stretches.   Maintain safety

## 2023-11-10 DIAGNOSIS — L03.811 CELLULITIS OF HEAD EXCEPT FACE: Primary | ICD-10-CM

## 2023-11-10 RX ORDER — CEPHALEXIN 500 MG/1
500 CAPSULE ORAL 2 TIMES DAILY
Qty: 14 CAPSULE | Refills: 0 | Status: SHIPPED | OUTPATIENT
Start: 2023-11-10 | End: 2023-11-17

## 2023-12-07 DIAGNOSIS — Z79.4 TYPE 2 DIABETES MELLITUS WITHOUT COMPLICATION, WITH LONG-TERM CURRENT USE OF INSULIN (HCC): ICD-10-CM

## 2023-12-07 DIAGNOSIS — E11.59 TYPE 2 DIABETES MELLITUS WITH OTHER CIRCULATORY COMPLICATION, WITHOUT LONG-TERM CURRENT USE OF INSULIN (HCC): ICD-10-CM

## 2023-12-07 DIAGNOSIS — E11.42 DIABETIC POLYNEUROPATHY ASSOCIATED WITH TYPE 2 DIABETES MELLITUS (HCC): ICD-10-CM

## 2023-12-07 DIAGNOSIS — E11.9 TYPE 2 DIABETES MELLITUS WITHOUT COMPLICATION, WITH LONG-TERM CURRENT USE OF INSULIN (HCC): ICD-10-CM

## 2023-12-07 RX ORDER — LISINOPRIL 10 MG/1
10 TABLET ORAL DAILY
Qty: 90 TABLET | Refills: 3 | Status: SHIPPED | OUTPATIENT
Start: 2023-12-07

## 2023-12-07 RX ORDER — INSULIN DEGLUDEC INJECTION 100 U/ML
20 INJECTION, SOLUTION SUBCUTANEOUS DAILY
Qty: 15 ML | Refills: 3 | Status: SHIPPED | OUTPATIENT
Start: 2023-12-07

## 2023-12-07 RX ORDER — METFORMIN HYDROCHLORIDE 500 MG/1
1000 TABLET, EXTENDED RELEASE ORAL
Qty: 180 TABLET | Refills: 3 | Status: SHIPPED | OUTPATIENT
Start: 2023-12-07 | End: 2023-12-07 | Stop reason: SDUPTHER

## 2023-12-07 RX ORDER — LISINOPRIL 10 MG/1
10 TABLET ORAL DAILY
Qty: 90 TABLET | Refills: 3 | Status: SHIPPED | OUTPATIENT
Start: 2023-12-07 | End: 2023-12-07 | Stop reason: SDUPTHER

## 2023-12-07 RX ORDER — GABAPENTIN 300 MG/1
300 CAPSULE ORAL 3 TIMES DAILY
Qty: 270 CAPSULE | Refills: 0 | Status: SHIPPED | OUTPATIENT
Start: 2023-12-07 | End: 2024-03-06

## 2023-12-07 RX ORDER — METFORMIN HYDROCHLORIDE 500 MG/1
1000 TABLET, EXTENDED RELEASE ORAL
Qty: 180 TABLET | Refills: 3 | Status: SHIPPED | OUTPATIENT
Start: 2023-12-07 | End: 2024-06-04

## 2023-12-07 RX ORDER — GABAPENTIN 300 MG/1
300 CAPSULE ORAL 3 TIMES DAILY
Qty: 270 CAPSULE | Refills: 0 | Status: SHIPPED | OUTPATIENT
Start: 2023-12-07 | End: 2023-12-07 | Stop reason: SDUPTHER

## 2023-12-07 RX ORDER — INSULIN DEGLUDEC INJECTION 100 U/ML
20 INJECTION, SOLUTION SUBCUTANEOUS DAILY
Qty: 15 ML | Refills: 3 | Status: SHIPPED | OUTPATIENT
Start: 2023-12-07 | End: 2023-12-07 | Stop reason: SDUPTHER

## 2023-12-11 DIAGNOSIS — M51.36 DEGENERATIVE DISC DISEASE, LUMBAR: ICD-10-CM

## 2023-12-11 DIAGNOSIS — E11.42 DIABETIC POLYNEUROPATHY ASSOCIATED WITH TYPE 2 DIABETES MELLITUS (HCC): ICD-10-CM

## 2023-12-11 DIAGNOSIS — M53.3 SI (SACROILIAC) JOINT DYSFUNCTION: ICD-10-CM

## 2023-12-11 RX ORDER — GABAPENTIN 300 MG/1
300 CAPSULE ORAL 3 TIMES DAILY
Qty: 270 CAPSULE | Refills: 0 | Status: CANCELLED | OUTPATIENT
Start: 2023-12-11 | End: 2024-03-10

## 2023-12-12 RX ORDER — CYCLOBENZAPRINE HCL 5 MG
5 TABLET ORAL 3 TIMES DAILY PRN
Qty: 30 TABLET | Refills: 0 | Status: SHIPPED | OUTPATIENT
Start: 2023-12-12

## 2023-12-15 RX ORDER — MELOXICAM 7.5 MG/1
7.5 TABLET ORAL DAILY
Qty: 90 TABLET | Refills: 0 | Status: SHIPPED | OUTPATIENT
Start: 2023-12-15

## 2024-01-10 DIAGNOSIS — E11.42 TYPE 2 DIABETES MELLITUS WITH DIABETIC POLYNEUROPATHY, WITH LONG-TERM CURRENT USE OF INSULIN (HCC): Primary | ICD-10-CM

## 2024-01-10 DIAGNOSIS — Z79.4 TYPE 2 DIABETES MELLITUS WITH DIABETIC POLYNEUROPATHY, WITH LONG-TERM CURRENT USE OF INSULIN (HCC): Primary | ICD-10-CM

## 2024-01-15 DIAGNOSIS — Z79.4 TYPE 2 DIABETES MELLITUS WITH DIABETIC POLYNEUROPATHY, WITH LONG-TERM CURRENT USE OF INSULIN (HCC): ICD-10-CM

## 2024-01-15 DIAGNOSIS — E11.42 TYPE 2 DIABETES MELLITUS WITH DIABETIC POLYNEUROPATHY, WITH LONG-TERM CURRENT USE OF INSULIN (HCC): ICD-10-CM

## 2024-01-15 NOTE — TELEPHONE ENCOUNTER
----- Message from Salinas Moralez sent at 1/15/2024  1:15 PM EST -----  Regarding: Refill  Contact: [phone]  Good afternoon. I ran out of trulicty. Can a refill be done? If so please send it to my insurance so they can mail it. Thanks

## 2024-01-31 ENCOUNTER — PROCEDURE VISIT (OUTPATIENT)
Dept: NEUROLOGY | Facility: CLINIC | Age: 39
End: 2024-01-31
Payer: COMMERCIAL

## 2024-01-31 DIAGNOSIS — M54.16 LUMBAR RADICULOPATHY: ICD-10-CM

## 2024-01-31 PROCEDURE — 95886 MUSC TEST DONE W/N TEST COMP: CPT | Performed by: PHYSICAL MEDICINE & REHABILITATION

## 2024-01-31 PROCEDURE — 95909 NRV CNDJ TST 5-6 STUDIES: CPT | Performed by: PHYSICAL MEDICINE & REHABILITATION

## 2024-03-28 ENCOUNTER — APPOINTMENT (OUTPATIENT)
Dept: LAB | Facility: CLINIC | Age: 39
End: 2024-03-28
Payer: COMMERCIAL

## 2024-03-28 DIAGNOSIS — E11.9 TYPE 2 DIABETES MELLITUS WITHOUT COMPLICATION, WITH LONG-TERM CURRENT USE OF INSULIN (HCC): ICD-10-CM

## 2024-03-28 DIAGNOSIS — Z79.4 TYPE 2 DIABETES MELLITUS WITHOUT COMPLICATION, WITH LONG-TERM CURRENT USE OF INSULIN (HCC): ICD-10-CM

## 2024-03-28 LAB
ALBUMIN SERPL BCP-MCNC: 4.2 G/DL (ref 3.5–5)
ALP SERPL-CCNC: 62 U/L (ref 34–104)
ALT SERPL W P-5'-P-CCNC: 21 U/L (ref 7–52)
ANION GAP SERPL CALCULATED.3IONS-SCNC: 9 MMOL/L (ref 4–13)
AST SERPL W P-5'-P-CCNC: 17 U/L (ref 13–39)
BASOPHILS # BLD AUTO: 0.05 THOUSANDS/ÂΜL (ref 0–0.1)
BASOPHILS NFR BLD AUTO: 0 % (ref 0–1)
BILIRUB SERPL-MCNC: 0.6 MG/DL (ref 0.2–1)
BUN SERPL-MCNC: 15 MG/DL (ref 5–25)
CALCIUM SERPL-MCNC: 9.3 MG/DL (ref 8.4–10.2)
CHLORIDE SERPL-SCNC: 103 MMOL/L (ref 96–108)
CHOLEST SERPL-MCNC: 168 MG/DL
CO2 SERPL-SCNC: 25 MMOL/L (ref 21–32)
CREAT SERPL-MCNC: 0.79 MG/DL (ref 0.6–1.3)
EOSINOPHIL # BLD AUTO: 0.18 THOUSAND/ÂΜL (ref 0–0.61)
EOSINOPHIL NFR BLD AUTO: 1 % (ref 0–6)
ERYTHROCYTE [DISTWIDTH] IN BLOOD BY AUTOMATED COUNT: 12.2 % (ref 11.6–15.1)
GFR SERPL CREATININE-BSD FRML MDRD: 113 ML/MIN/1.73SQ M
GLUCOSE P FAST SERPL-MCNC: 168 MG/DL (ref 65–99)
HCT VFR BLD AUTO: 45.3 % (ref 36.5–49.3)
HDLC SERPL-MCNC: 29 MG/DL
HGB BLD-MCNC: 15.1 G/DL (ref 12–17)
IMM GRANULOCYTES # BLD AUTO: 0.08 THOUSAND/UL (ref 0–0.2)
IMM GRANULOCYTES NFR BLD AUTO: 1 % (ref 0–2)
LDLC SERPL CALC-MCNC: 85 MG/DL (ref 0–100)
LYMPHOCYTES # BLD AUTO: 3.16 THOUSANDS/ÂΜL (ref 0.6–4.47)
LYMPHOCYTES NFR BLD AUTO: 25 % (ref 14–44)
MCH RBC QN AUTO: 29.9 PG (ref 26.8–34.3)
MCHC RBC AUTO-ENTMCNC: 33.3 G/DL (ref 31.4–37.4)
MCV RBC AUTO: 90 FL (ref 82–98)
MONOCYTES # BLD AUTO: 1.09 THOUSAND/ÂΜL (ref 0.17–1.22)
MONOCYTES NFR BLD AUTO: 9 % (ref 4–12)
NEUTROPHILS # BLD AUTO: 8.23 THOUSANDS/ÂΜL (ref 1.85–7.62)
NEUTS SEG NFR BLD AUTO: 64 % (ref 43–75)
NRBC BLD AUTO-RTO: 0 /100 WBCS
PLATELET # BLD AUTO: 276 THOUSANDS/UL (ref 149–390)
PMV BLD AUTO: 10.4 FL (ref 8.9–12.7)
POTASSIUM SERPL-SCNC: 4.5 MMOL/L (ref 3.5–5.3)
PROT SERPL-MCNC: 7.8 G/DL (ref 6.4–8.4)
RBC # BLD AUTO: 5.05 MILLION/UL (ref 3.88–5.62)
SODIUM SERPL-SCNC: 137 MMOL/L (ref 135–147)
TRIGL SERPL-MCNC: 268 MG/DL
TSH SERPL DL<=0.05 MIU/L-ACNC: 1.66 UIU/ML (ref 0.45–4.5)
WBC # BLD AUTO: 12.79 THOUSAND/UL (ref 4.31–10.16)

## 2024-03-28 PROCEDURE — 36415 COLL VENOUS BLD VENIPUNCTURE: CPT

## 2024-03-28 PROCEDURE — 85025 COMPLETE CBC W/AUTO DIFF WBC: CPT

## 2024-03-28 PROCEDURE — 80061 LIPID PANEL: CPT

## 2024-03-28 PROCEDURE — 84443 ASSAY THYROID STIM HORMONE: CPT

## 2024-03-28 PROCEDURE — 80053 COMPREHEN METABOLIC PANEL: CPT

## 2024-03-29 ENCOUNTER — OFFICE VISIT (OUTPATIENT)
Dept: FAMILY MEDICINE CLINIC | Facility: CLINIC | Age: 39
End: 2024-03-29
Payer: COMMERCIAL

## 2024-03-29 VITALS
HEART RATE: 100 BPM | TEMPERATURE: 98.6 F | HEIGHT: 70 IN | BODY MASS INDEX: 32.18 KG/M2 | WEIGHT: 224.8 LBS | RESPIRATION RATE: 14 BRPM | DIASTOLIC BLOOD PRESSURE: 82 MMHG | SYSTOLIC BLOOD PRESSURE: 126 MMHG | OXYGEN SATURATION: 99 %

## 2024-03-29 DIAGNOSIS — E11.42 DIABETIC POLYNEUROPATHY ASSOCIATED WITH TYPE 2 DIABETES MELLITUS (HCC): ICD-10-CM

## 2024-03-29 DIAGNOSIS — D72.9 ABNORMAL NEUTROPHIL COUNT: Primary | ICD-10-CM

## 2024-03-29 DIAGNOSIS — E11.42 TYPE 2 DIABETES MELLITUS WITH DIABETIC POLYNEUROPATHY, WITH LONG-TERM CURRENT USE OF INSULIN (HCC): ICD-10-CM

## 2024-03-29 DIAGNOSIS — Z79.4 TYPE 2 DIABETES MELLITUS WITHOUT COMPLICATION, WITH LONG-TERM CURRENT USE OF INSULIN (HCC): ICD-10-CM

## 2024-03-29 DIAGNOSIS — K59.00 CONSTIPATION, UNSPECIFIED CONSTIPATION TYPE: ICD-10-CM

## 2024-03-29 DIAGNOSIS — Z79.4 TYPE 2 DIABETES MELLITUS WITH DIABETIC POLYNEUROPATHY, WITH LONG-TERM CURRENT USE OF INSULIN (HCC): ICD-10-CM

## 2024-03-29 DIAGNOSIS — E11.9 TYPE 2 DIABETES MELLITUS WITHOUT COMPLICATION, WITH LONG-TERM CURRENT USE OF INSULIN (HCC): ICD-10-CM

## 2024-03-29 DIAGNOSIS — M51.36 DEGENERATIVE DISC DISEASE, LUMBAR: ICD-10-CM

## 2024-03-29 PROCEDURE — 99215 OFFICE O/P EST HI 40 MIN: CPT | Performed by: NURSE PRACTITIONER

## 2024-03-29 RX ORDER — CYCLOBENZAPRINE HCL 5 MG
5 TABLET ORAL 3 TIMES DAILY PRN
Qty: 30 TABLET | Refills: 0 | Status: SHIPPED | OUTPATIENT
Start: 2024-03-29

## 2024-03-29 RX ORDER — GABAPENTIN 300 MG/1
300 CAPSULE ORAL 3 TIMES DAILY
Qty: 30 CAPSULE | Refills: 0 | Status: SHIPPED | OUTPATIENT
Start: 2024-03-29 | End: 2024-06-27

## 2024-03-29 RX ORDER — LISINOPRIL 10 MG/1
10 TABLET ORAL DAILY
Qty: 30 TABLET | Refills: 0 | Status: SHIPPED | OUTPATIENT
Start: 2024-03-29

## 2024-03-29 RX ORDER — POLYETHYLENE GLYCOL 3350 17 G/17G
17 POWDER, FOR SOLUTION ORAL DAILY
Qty: 340 G | Refills: 0 | Status: SHIPPED | OUTPATIENT
Start: 2024-03-29

## 2024-03-29 NOTE — PROGRESS NOTES
Name: Salinas Moralez      : 1985      MRN: [id number]  Encounter Provider: PARUL Hernandez  Encounter Date: 3/29/2024   Encounter department: [facility]    Assessment & Plan     1. Abnormal neutrophil count  Assessment & Plan:  Has elevated neutrophils and white blood cells.  Denies any respiratory or urinary symptoms.  Denies any recent steroid use.  Will recheck levels if it continues to be abnormal will evaluate the need to send to hematology.    Orders:  -     CBC and differential; Future    2. Diabetic polyneuropathy associated with type 2 diabetes mellitus (HCC)  Assessment & Plan:    Lab Results   Component Value Date    HGBA1C 8.6 (A) 10/31/2023   Continues to have pain, burning sensation in both legs.  Discussed management of neuropathy.  Continue with Neurontin.    Orders:  -     gabapentin (NEURONTIN) 300 mg capsule; Take 1 capsule (300 mg total) by mouth 3 (three) times a day    3. Type 2 diabetes mellitus without complication, with long-term current use of insulin ([facility]ly [facility])  -     lisinopril (ZESTRIL) 10 mg tablet; Take 1 tablet (10 mg total) by mouth daily    4. Degenerative disc disease, lumbar  -     cyclobenzaprine (FLEXERIL) 5 mg tablet; Take 1 tablet (5 mg total) by mouth 3 (three) times a day as needed for muscle spasms    5. Constipation, unspecified constipation type  Assessment & Plan:  Reports increased problems with constipation.  Maintain good fluid hydration, fiber intake.  MiraLAX ordered.    Orders:  -     polyethylene glycol (MIRALAX) 17 g packet; Take 17 g by mouth daily    6. Type 2 diabetes mellitus with diabetic polyneuropathy, with long-term current use of insulin ([facility])  Assessment & Plan:    Lab Results   Component Value Date    HGBA1C 8.6 (A) 10/31/2023   Globin A1c is improving.  Continue medications.  Continue insulin.  Maintain low carbohydrate diet.          Depression Screening and Follow-up Plan: Patient was screened for depression during  today's encounter. They screened negative with a PHQ-2 score of 0.        Subjective      Here for follow-up on chronic health conditions.  Continues to have bilateral foot pain.  Blood sugars have improved.      Review of Systems   Constitutional: Negative.    HENT: Negative.     Eyes: Negative.    Respiratory: Negative.     Cardiovascular: Negative.    Gastrointestinal: Negative.    Endocrine: Negative.    Genitourinary: Negative.    Musculoskeletal:  Positive for arthralgias.   Skin: Negative.    Allergic/Immunologic: Negative.    Neurological: Negative.    Hematological: Negative.    Psychiatric/Behavioral: Negative.         Current Outpatient Medications on File Prior to Visit   Medication Sig   • Blood Glucose Monitoring Suppl (OneTouch Verio Reflect) w/Device KIT Check blood sugars once daily. Please substitute with appropriate alternative as covered by patient's insurance. Dx: E11.65   • Diclofenac Sodium (VOLTAREN) 1 % Apply 2 g topically 4 (four) times a day   • dulaglutide (Trulicity) 3 MG/0.5ML injection Inject 0.5 mL (3 mg total) under the skin every 7 days   • glucose blood (OneTouch Verio) test strip Check blood sugars once daily. Please substitute with appropriate alternative as covered by patient's insurance. Dx: E11.65   • insulin degludec ([place] FlexTouch) 100 units/mL injection pen Inject 20 Units under the skin daily   • Insulin Pen Needle (BD Pen Needle Margo U/F) 32G X 4 MM MISC Use daily as directed with insulin pen   • meloxicam (Mobic) 7.5 mg tablet Take 1 tablet (7.5 mg total) by mouth daily   • metFORMIN (GLUCOPHAGE-XR) 500 mg 24 hr tablet Take 2 tablets (1,000 mg total) by mouth daily with breakfast   • mometasone (ELOCON) 0.1 % cream Apply topically daily   • OneTouch Delica Lancets 33G MISC Check blood sugars once daily. Please substitute with appropriate alternative as covered by patient's insurance. Dx: E11.65   • [DISCONTINUED] cyclobenzaprine (FLEXERIL) 5 mg tablet Take 1 tablet  "(5 mg total) by mouth 3 (three) times a day as needed for muscle spasms   • [DISCONTINUED] lisinopril (ZESTRIL) 10 mg tablet Take 1 tablet (10 mg total) by mouth daily   • cholecalciferol (VITAMIN D3) 1,000 units tablet Take 2 tablets (2,000 Units total) by mouth daily for 3 days   • [DISCONTINUED] gabapentin (NEURONTIN) 300 mg capsule Take 1 capsule (300 mg total) by mouth 3 (three) times a day       Objective     /82   Pulse 100   Temp 98.6 °F (37 °C) (Temporal)   Resp 14   Ht 5' 9.69\" (1.77 m)   Wt 102 kg (224 lb 12.8 oz)   SpO2 99%   BMI 32.54 kg/m²     Physical Exam  Vitals and nursing note reviewed.   Constitutional:       Appearance: He is well-developed. He is obese.   HENT:      Head: Normocephalic and atraumatic.   Cardiovascular:      Rate and Rhythm: Regular rhythm. Tachycardia present.   Pulmonary:      Effort: Pulmonary effort is normal.      Breath sounds: Normal breath sounds.   Abdominal:      Palpations: Abdomen is soft.   Musculoskeletal:         General: Normal range of motion.   Skin:     General: Skin is warm and dry.   Neurological:      General: No focal deficit present.      Mental Status: He is alert and oriented to person, place, and time.   Psychiatric:         Mood and Affect: Mood normal.         Behavior: Behavior normal.         Thought Content: Thought content normal.         Judgment: Judgment normal.       PARUL Hernandez    "

## 2024-03-29 NOTE — ASSESSMENT & PLAN NOTE
Reports increased problems with constipation.  Maintain good fluid hydration, fiber intake.  MiraLAX ordered.

## 2024-03-29 NOTE — ASSESSMENT & PLAN NOTE
Pressure is at goal.  Continue lisinopril.  Maintain good glucose control.  Also low-salt heart healthy diet.

## 2024-03-29 NOTE — ASSESSMENT & PLAN NOTE
Lab Results   Component Value Date    HGBA1C 8.6 (A) 10/31/2023   Continues to have pain, burning sensation in both legs.  Discussed management of neuropathy.  Continue with Neurontin.

## 2024-03-29 NOTE — ASSESSMENT & PLAN NOTE
Has elevated neutrophils and white blood cells.  Denies any respiratory or urinary symptoms.  Denies any recent steroid use.  Will recheck levels if it continues to be abnormal will evaluate the need to send to hematology.

## 2024-03-29 NOTE — ASSESSMENT & PLAN NOTE
Lab Results   Component Value Date    HGBA1C 8.6 (A) 10/31/2023   Globin A1c is improving.  Continue medications.  Continue insulin.  Maintain low carbohydrate diet.

## 2024-03-29 NOTE — PATIENT INSTRUCTIONS
Constipation   WHAT YOU NEED TO KNOW:   Constipation means you have hard, dry bowel movements, or you go longer than usual between bowel movements.  DISCHARGE INSTRUCTIONS:   Call your doctor if:   You have blood in your bowel movements.    You have a fever and abdominal pain with the constipation.    Your constipation gets worse.    You start to vomit.    You have questions or concerns about your condition or care.    Medicines:   Medicine  such as a laxative may help relax and loosen your intestines to help you have a bowel movement. Your provider may recommend you only use laxatives for a short time. Long-term use can damage your bowel function over time.    Take your medicine as directed.  Contact your healthcare provider if you think your medicine is not helping or if you have side effects. Tell your provider if you are allergic to any medicine. Keep a list of the medicines, vitamins, and herbs you take. Include the amounts, and when and why you take them. Bring the list or the pill bottles to follow-up visits. Carry your medicine list with you in case of an emergency.    Relieve constipation:   A suppository  may be used to help soften your bowel movements. This may make them easier to pass. A suppository is guided into your rectum through your anus.         An enema  is liquid medicine used to clear bowel movement from your rectum. The medicine is put into your rectum through your anus.       Prevent constipation:   Drink liquids as directed.  You may need to drink extra liquids to help soften and move your bowels. Ask how much liquid to drink each day and which liquids are best for you.    Eat high-fiber foods.  This may help decrease constipation by adding bulk to your bowel movements. High-fiber foods include fruit, vegetables, whole-grain breads and cereals, and beans. Your healthcare provider or dietitian can help you create a high-fiber meal plan. Your provider may also recommend a fiber supplement if  you cannot get enough fiber from food.         Exercise regularly.  Regular physical activity can help stimulate your intestines. Walking is a good exercise to prevent or relieve constipation. Ask which exercises are best for you.         Schedule a time each day to have a bowel movement.  This may help train your body to have regular bowel movements. Bend forward while you are on the toilet to help move the bowel movement out. Sit on the toilet for at least 10 minutes, even if you do not have a bowel movement.    Talk to your healthcare provider about your medicines.  Certain medicines, such as opioids, can cause constipation. Your provider may be able to make medicine changes. For example, he or she may change the kind of medicine, or change when you take it.    Follow up with your doctor as directed:  Write down your questions so you remember to ask them during your visits.  © Copyright [employer] 2023 Information is for End User's use only and may not be sold, redistributed or otherwise used for commercial purposes.  The above information is an  only. It is not intended as medical advice for individual conditions or treatments. Talk to your doctor, nurse or pharmacist before following any medical regimen to see if it is safe and effective for you.  Diabetes and Nutrition   WHAT YOU NEED TO KNOW:   Nutrition plans help keep blood sugar levels steady. They also help delay or prevent complications of diabetes, such as diabetic kidney disease.  DISCHARGE INSTRUCTIONS:   Call your local emergency number (126 in the [place]) if:   You have any of the following signs of a heart attack:      Squeezing, pressure, or pain in your chest    You may  also have any of the following:     Discomfort or pain in your back, neck, jaw, stomach, or arm    Shortness of breath    Nausea or vomiting    Lightheadedness or a sudden cold sweat      Return to the emergency department if:   You have a low blood sugar level and it  does not improve with treatment. Symptoms are trouble thinking, a pounding heartbeat, and sweating.    Your blood sugar level is above 240 mg/dL and does not come down within 15 minutes of treatment.    You have ketones in your blood or urine.    You have nausea or are vomiting and cannot keep any food or liquid down.    You have blurred or double vision.    Your breath has a fruity, sweet smell, or your breathing is shallow.    Call your doctor or diabetes care team provider if:   Your blood sugar levels are higher than your target goals.    You often have low blood sugar levels.    You have trouble coping with diabetes, or you feel anxious or depressed.    You have questions or concerns about your condition or care.    A dietitian will help you create a nutrition plan  to meet your needs and your family's needs. Your dietitian may explain a plan such as the Dietary Approaches to Stop Hypertension (DASH) eating plan or the Mediterranean diet. The goal is for you to reach or maintain healthy weight, blood sugar, blood pressure, and lipid levels. You should meet with the dietitian at least 1 time each year. You will learn the following:  How food affects your blood sugar levels    How to create healthy eating habits    How to make food choices based on your activity level, weight, and glucose levels    How your favorite foods may fit into your plan    How to keep track of carbohydrates    Correct portion sizes for each food    Changes you can make to your plan if you get pregnant or are breastfeeding       What you can do before you meet with the dietitian:   Do not skip meals.  The goal is to keep your blood sugar level steady. Blood sugar levels may drop too low if you have received insulin and do not eat.    Eat more high-fiber foods.  Examples include fresh or frozen fruits and vegetables, whole-grain breads, and beans. Fiber helps control or lower blood sugar and cholesterol levels. Choose whole fruits instead  of fruit juice as much as possible. Sugar may be added to juice, and fiber may be removed.         Choose heart-healthy fats.  Foods high in heart-healthy fats include olive oil, nuts, avocados, and fatty fish, such as salmon and tuna. Foods high in unhealthy fats include red meat, full-fat dairy products, and soft margarine. Unhealthy fats can increase your risk for heart disease, increase bad cholesterol, and lower good cholesterol.         Choose complex carbohydrates.  Foods with complex carbohydrates include brown rice, whole-grain breads and cereals, and cooked beans. Foods with simple carbohydrates include white bread, white rice, most cold cereals, and snack foods. Your plan will include the amount of carbohydrate to have at one time or in a day. Your blood sugar level can get too high if you eat too much carbohydrate at one time. Blood sugar levels do not spike as high or drop as quickly with complex carbohydrates as with simple carbohydrates. Choose complex carbohydrates whenever possible.    Have less sodium (salt).  The risk for high blood pressure (BP) increases with high-sodium foods. Limit high-sodium foods, such as soy sauce, potato chips, and canned soup. Do not add salt to food you cook. Limit your use of table salt. Read labels to have no more than 2,300 milligrams of sodium in one day.         Limit artificial sweeteners.  These may be found in food or drinks, such as diet soft drinks or other low-calorie beverages. Artificial sweeteners are low in calories. They may help you lower your overall calories and carbohydrates. It is important not to have more calories from other foods to make up for the calories saved. Artificial sweeteners do not have any nutrition. Eat whole foods and drink water as much as possible. Your plan may include beverages with artificial sweeteners for a short time. These can help you transition from high-sugar beverages to water.    Use the plate method for each meal.   This method can help you eat the right amount of carbohydrates and keep your blood sugar levels under control.    Draw an imaginary line down the middle of a 9-inch dinner plate.  On one side, draw another line to divide that section in half. Your plate will have one large section and 2 small sections.    Fill the largest section with non-starchy vegetables.  These include broccoli, spinach, cucumbers, peppers, cauliflower, and tomatoes.    Add a carbohydrate to one of the small sections.  Examples include pasta, rice, whole-grain bread, tortillas, corn, potatoes, and beans. Your plan may allow a serving of low-fat dairy or a small fruit as a carbohydrate.    Add meat or another source of protein to the other small section.  Examples include chicken or turkey without skin, fish, lean beef or pork, low-fat cheese, tofu, and eggs.    Have a low-calorie or calorie-free drink with your meal.  Examples include water or unsweetened tea or coffee.       Reach and maintain a healthy weight:  A healthy weight can help you control your diabetes. Ask your healthcare provider what a healthy weight is for you. Even a weight loss of 3% to 7% of excess body weight can help you manage diabetes. Your provider can help you create a weight-loss plan, if needed. For example, your goal may be to lose at least 5% of your extra weight in the first 6 months.  What you need to know if you choose to drink alcohol:   Alcohol can cause health problems.  Alcohol can cause hypoglycemia (very low blood sugar level), especially if you use insulin. Alcohol can cause high blood sugar and BP levels, and weight gain if you drink too much.    Hypoglycemia can happen hours after you drink alcohol.  Check your blood sugar level for several hours after you drink alcohol. Have a source of fast-acting carbohydrates with you in case your level goes too low. You need immediate care if you have signs or symptoms of hypoglycemia, such as sweating, confusion, or  fainting.    Limit alcohol as directed.  Your healthcare provider can tell you how many drinks are okay for you within 24 hours or within 1 week. Women 21 years or older and men 65 years or older should limit alcohol to 1 drink a day. Men aged 21 to 64 years should limit alcohol to 2 drinks a day. A drink of alcohol is 12 ounces of beer, 5 ounces of wine, or 1½ ounces of liquor.    Always have food when you drink alcohol.  Your blood sugar may fall to a low level if you drink when your stomach is empty.    Follow up with your doctor or diabetes team provider as directed:  Your doctor or provider may recommend counseling to help you make nutrition changes. Write down your questions so you remember to ask them during your visits.  © Copyright [employer] 2023 Information is for End User's use only and may not be sold, redistributed or otherwise used for commercial purposes.  The above information is an  only. It is not intended as medical advice for individual conditions or treatments. Talk to your doctor, nurse or pharmacist before following any medical regimen to see if it is safe and effective for you.

## 2024-04-04 ENCOUNTER — APPOINTMENT (OUTPATIENT)
Dept: LAB | Facility: CLINIC | Age: 39
End: 2024-04-04
Payer: COMMERCIAL

## 2024-04-04 DIAGNOSIS — D72.9 ABNORMAL NEUTROPHIL COUNT: ICD-10-CM

## 2024-04-04 LAB
BASOPHILS # BLD AUTO: 0.08 THOUSANDS/ÂΜL (ref 0–0.1)
BASOPHILS NFR BLD AUTO: 1 % (ref 0–1)
EOSINOPHIL # BLD AUTO: 0.18 THOUSAND/ÂΜL (ref 0–0.61)
EOSINOPHIL NFR BLD AUTO: 1 % (ref 0–6)
ERYTHROCYTE [DISTWIDTH] IN BLOOD BY AUTOMATED COUNT: 12.1 % (ref 11.6–15.1)
HCT VFR BLD AUTO: 45.4 % (ref 36.5–49.3)
HGB BLD-MCNC: 15.4 G/DL (ref 12–17)
IMM GRANULOCYTES # BLD AUTO: 0.07 THOUSAND/UL (ref 0–0.2)
IMM GRANULOCYTES NFR BLD AUTO: 1 % (ref 0–2)
LYMPHOCYTES # BLD AUTO: 3.42 THOUSANDS/ÂΜL (ref 0.6–4.47)
LYMPHOCYTES NFR BLD AUTO: 24 % (ref 14–44)
MCH RBC QN AUTO: 30 PG (ref 26.8–34.3)
MCHC RBC AUTO-ENTMCNC: 33.9 G/DL (ref 31.4–37.4)
MCV RBC AUTO: 89 FL (ref 82–98)
MONOCYTES # BLD AUTO: 1.11 THOUSAND/ÂΜL (ref 0.17–1.22)
MONOCYTES NFR BLD AUTO: 8 % (ref 4–12)
NEUTROPHILS # BLD AUTO: 9.2 THOUSANDS/ÂΜL (ref 1.85–7.62)
NEUTS SEG NFR BLD AUTO: 65 % (ref 43–75)
NRBC BLD AUTO-RTO: 0 /100 WBCS
PLATELET # BLD AUTO: 296 THOUSANDS/UL (ref 149–390)
PMV BLD AUTO: 10.1 FL (ref 8.9–12.7)
RBC # BLD AUTO: 5.13 MILLION/UL (ref 3.88–5.62)
WBC # BLD AUTO: 14.06 THOUSAND/UL (ref 4.31–10.16)

## 2024-04-04 PROCEDURE — 36415 COLL VENOUS BLD VENIPUNCTURE: CPT

## 2024-04-04 PROCEDURE — 85025 COMPLETE CBC W/AUTO DIFF WBC: CPT

## 2024-04-12 DIAGNOSIS — M51.36 DEGENERATIVE DISC DISEASE, LUMBAR: ICD-10-CM

## 2024-04-12 DIAGNOSIS — Z79.4 TYPE 2 DIABETES MELLITUS WITH DIABETIC POLYNEUROPATHY, WITH LONG-TERM CURRENT USE OF INSULIN (HCC): ICD-10-CM

## 2024-04-12 DIAGNOSIS — E11.42 TYPE 2 DIABETES MELLITUS WITH DIABETIC POLYNEUROPATHY, WITH LONG-TERM CURRENT USE OF INSULIN (HCC): ICD-10-CM

## 2024-04-12 DIAGNOSIS — M53.3 SI (SACROILIAC) JOINT DYSFUNCTION: ICD-10-CM

## 2024-04-12 DIAGNOSIS — J12.82 PNEUMONIA DUE TO COVID-19 VIRUS: ICD-10-CM

## 2024-04-12 DIAGNOSIS — U07.1 PNEUMONIA DUE TO COVID-19 VIRUS: ICD-10-CM

## 2024-04-12 DIAGNOSIS — Z79.4 TYPE 2 DIABETES MELLITUS WITHOUT COMPLICATION, WITH LONG-TERM CURRENT USE OF INSULIN (HCC): ICD-10-CM

## 2024-04-12 DIAGNOSIS — E11.9 TYPE 2 DIABETES MELLITUS WITHOUT COMPLICATION, WITH LONG-TERM CURRENT USE OF INSULIN (HCC): ICD-10-CM

## 2024-04-12 RX ORDER — CYCLOBENZAPRINE HCL 5 MG
5 TABLET ORAL 3 TIMES DAILY PRN
Qty: 30 TABLET | Refills: 0 | Status: SHIPPED | OUTPATIENT
Start: 2024-04-12

## 2024-04-12 RX ORDER — MELOXICAM 7.5 MG/1
7.5 TABLET ORAL DAILY
Qty: 90 TABLET | Refills: 0 | Status: SHIPPED | OUTPATIENT
Start: 2024-04-12

## 2024-04-12 RX ORDER — LISINOPRIL 10 MG/1
10 TABLET ORAL DAILY
Qty: 30 TABLET | Refills: 0 | Status: SHIPPED | OUTPATIENT
Start: 2024-04-12

## 2024-05-15 DIAGNOSIS — G89.29 CHRONIC BILATERAL LOW BACK PAIN, UNSPECIFIED WHETHER SCIATICA PRESENT: ICD-10-CM

## 2024-05-15 DIAGNOSIS — L30.9 ECZEMA, UNSPECIFIED TYPE: ICD-10-CM

## 2024-05-15 DIAGNOSIS — M54.50 CHRONIC BILATERAL LOW BACK PAIN, UNSPECIFIED WHETHER SCIATICA PRESENT: ICD-10-CM

## 2024-05-16 RX ORDER — MOMETASONE FUROATE 1 MG/G
CREAM TOPICAL DAILY
Qty: 50 G | Refills: 3 | Status: SHIPPED | OUTPATIENT
Start: 2024-05-16

## 2024-07-18 ENCOUNTER — TELEPHONE (OUTPATIENT)
Age: 39
End: 2024-07-18

## 2024-07-18 NOTE — TELEPHONE ENCOUNTER
Wife asking if the [place] paperwork that she dropped off a few days ago is finished and ready for her to ?    Please advise

## 2024-07-19 ENCOUNTER — TELEPHONE (OUTPATIENT)
Dept: FAMILY MEDICINE CLINIC | Facility: CLINIC | Age: 39
End: 2024-07-19

## 2024-07-23 DIAGNOSIS — E11.42 TYPE 2 DIABETES MELLITUS WITH DIABETIC POLYNEUROPATHY, WITH LONG-TERM CURRENT USE OF INSULIN (HCC): Primary | ICD-10-CM

## 2024-07-23 DIAGNOSIS — E55.9 HYPOVITAMINOSIS D: ICD-10-CM

## 2024-07-23 DIAGNOSIS — Z79.4 TYPE 2 DIABETES MELLITUS WITH DIABETIC POLYNEUROPATHY, WITH LONG-TERM CURRENT USE OF INSULIN (HCC): Primary | ICD-10-CM

## 2024-07-25 ENCOUNTER — APPOINTMENT (OUTPATIENT)
Dept: LAB | Facility: CLINIC | Age: 39
End: 2024-07-25
Payer: COMMERCIAL

## 2024-07-25 DIAGNOSIS — E55.9 HYPOVITAMINOSIS D: ICD-10-CM

## 2024-07-25 DIAGNOSIS — E11.42 TYPE 2 DIABETES MELLITUS WITH DIABETIC POLYNEUROPATHY, WITH LONG-TERM CURRENT USE OF INSULIN (HCC): ICD-10-CM

## 2024-07-25 DIAGNOSIS — Z79.4 TYPE 2 DIABETES MELLITUS WITH DIABETIC POLYNEUROPATHY, WITH LONG-TERM CURRENT USE OF INSULIN (HCC): ICD-10-CM

## 2024-07-25 LAB
25(OH)D3 SERPL-MCNC: 18.2 NG/ML (ref 30–100)
ALBUMIN SERPL BCG-MCNC: 4 G/DL (ref 3.5–5)
ALP SERPL-CCNC: 69 U/L (ref 34–104)
ALT SERPL W P-5'-P-CCNC: 22 U/L (ref 7–52)
ANION GAP SERPL CALCULATED.3IONS-SCNC: 13 MMOL/L (ref 4–13)
AST SERPL W P-5'-P-CCNC: 17 U/L (ref 13–39)
BASOPHILS # BLD AUTO: 0.07 THOUSANDS/ÂΜL (ref 0–0.1)
BASOPHILS NFR BLD AUTO: 1 % (ref 0–1)
BILIRUB SERPL-MCNC: 0.51 MG/DL (ref 0.2–1)
BUN SERPL-MCNC: 11 MG/DL (ref 5–25)
CALCIUM SERPL-MCNC: 9.3 MG/DL (ref 8.4–10.2)
CHLORIDE SERPL-SCNC: 101 MMOL/L (ref 96–108)
CHOLEST SERPL-MCNC: 183 MG/DL
CO2 SERPL-SCNC: 24 MMOL/L (ref 21–32)
CREAT SERPL-MCNC: 0.74 MG/DL (ref 0.6–1.3)
CREAT UR-MCNC: 204 MG/DL
EOSINOPHIL # BLD AUTO: 0.18 THOUSAND/ÂΜL (ref 0–0.61)
EOSINOPHIL NFR BLD AUTO: 2 % (ref 0–6)
ERYTHROCYTE [DISTWIDTH] IN BLOOD BY AUTOMATED COUNT: 12 % (ref 11.6–15.1)
EST. AVERAGE GLUCOSE BLD GHB EST-MCNC: 209 MG/DL
GFR SERPL CREATININE-BSD FRML MDRD: 117 ML/MIN/1.73SQ M
GLUCOSE P FAST SERPL-MCNC: 180 MG/DL (ref 65–99)
HBA1C MFR BLD: 8.9 %
HCT VFR BLD AUTO: 46.5 % (ref 36.5–49.3)
HDLC SERPL-MCNC: 31 MG/DL
HGB BLD-MCNC: 15.6 G/DL (ref 12–17)
IMM GRANULOCYTES # BLD AUTO: 0.1 THOUSAND/UL (ref 0–0.2)
IMM GRANULOCYTES NFR BLD AUTO: 1 % (ref 0–2)
LDLC SERPL CALC-MCNC: 100 MG/DL (ref 0–100)
LYMPHOCYTES # BLD AUTO: 3.49 THOUSANDS/ÂΜL (ref 0.6–4.47)
LYMPHOCYTES NFR BLD AUTO: 30 % (ref 14–44)
MCH RBC QN AUTO: 29.6 PG (ref 26.8–34.3)
MCHC RBC AUTO-ENTMCNC: 33.5 G/DL (ref 31.4–37.4)
MCV RBC AUTO: 88 FL (ref 82–98)
MICROALBUMIN UR-MCNC: 440.8 MG/L
MICROALBUMIN/CREAT 24H UR: 216 MG/G CREATININE (ref 0–30)
MONOCYTES # BLD AUTO: 0.99 THOUSAND/ÂΜL (ref 0.17–1.22)
MONOCYTES NFR BLD AUTO: 9 % (ref 4–12)
NEUTROPHILS # BLD AUTO: 6.74 THOUSANDS/ÂΜL (ref 1.85–7.62)
NEUTS SEG NFR BLD AUTO: 57 % (ref 43–75)
NRBC BLD AUTO-RTO: 0 /100 WBCS
PLATELET # BLD AUTO: 273 THOUSANDS/UL (ref 149–390)
PMV BLD AUTO: 10.3 FL (ref 8.9–12.7)
POTASSIUM SERPL-SCNC: 4 MMOL/L (ref 3.5–5.3)
PROT SERPL-MCNC: 7.5 G/DL (ref 6.4–8.4)
RBC # BLD AUTO: 5.27 MILLION/UL (ref 3.88–5.62)
SODIUM SERPL-SCNC: 138 MMOL/L (ref 135–147)
TRIGL SERPL-MCNC: 260 MG/DL
TSH SERPL DL<=0.05 MIU/L-ACNC: 2.34 UIU/ML (ref 0.45–4.5)
WBC # BLD AUTO: 11.57 THOUSAND/UL (ref 4.31–10.16)

## 2024-07-25 PROCEDURE — 82570 ASSAY OF URINE CREATININE: CPT

## 2024-07-25 PROCEDURE — 83036 HEMOGLOBIN GLYCOSYLATED A1C: CPT

## 2024-07-25 PROCEDURE — 82043 UR ALBUMIN QUANTITATIVE: CPT

## 2024-07-25 PROCEDURE — 36415 COLL VENOUS BLD VENIPUNCTURE: CPT

## 2024-07-25 PROCEDURE — 80053 COMPREHEN METABOLIC PANEL: CPT

## 2024-07-25 PROCEDURE — 82306 VITAMIN D 25 HYDROXY: CPT

## 2024-07-25 PROCEDURE — 80061 LIPID PANEL: CPT

## 2024-07-25 PROCEDURE — 85025 COMPLETE CBC W/AUTO DIFF WBC: CPT

## 2024-07-25 PROCEDURE — 84443 ASSAY THYROID STIM HORMONE: CPT

## 2024-07-30 ENCOUNTER — TELEPHONE (OUTPATIENT)
Dept: FAMILY MEDICINE CLINIC | Facility: CLINIC | Age: 39
End: 2024-07-30

## 2024-07-30 DIAGNOSIS — E11.59 TYPE 2 DIABETES MELLITUS WITH OTHER CIRCULATORY COMPLICATION, WITHOUT LONG-TERM CURRENT USE OF INSULIN (HCC): ICD-10-CM

## 2024-07-30 DIAGNOSIS — E11.42 TYPE 2 DIABETES MELLITUS WITH DIABETIC POLYNEUROPATHY, WITH LONG-TERM CURRENT USE OF INSULIN (HCC): ICD-10-CM

## 2024-07-30 DIAGNOSIS — E11.9 TYPE 2 DIABETES MELLITUS WITHOUT COMPLICATION, WITH LONG-TERM CURRENT USE OF INSULIN (HCC): ICD-10-CM

## 2024-07-30 DIAGNOSIS — Z79.4 TYPE 2 DIABETES MELLITUS WITH DIABETIC POLYNEUROPATHY, WITH LONG-TERM CURRENT USE OF INSULIN (HCC): ICD-10-CM

## 2024-07-30 DIAGNOSIS — Z79.4 TYPE 2 DIABETES MELLITUS WITHOUT COMPLICATION, WITH LONG-TERM CURRENT USE OF INSULIN (HCC): ICD-10-CM

## 2024-07-30 RX ORDER — LISINOPRIL 10 MG/1
10 TABLET ORAL DAILY
Qty: 30 TABLET | Refills: 0 | Status: SHIPPED | OUTPATIENT
Start: 2024-07-30

## 2024-07-30 RX ORDER — METFORMIN HYDROCHLORIDE 500 MG/1
1000 TABLET, EXTENDED RELEASE ORAL
Qty: 180 TABLET | Refills: 1 | Status: SHIPPED | OUTPATIENT
Start: 2024-07-30 | End: 2025-01-26

## 2024-07-30 NOTE — TELEPHONE ENCOUNTER
----- Message from PARUL Hernandez sent at 7/30/2024  3:56 PM EDT -----  Hemoglobin A1c is increasing, pt should take his diabetic medications.

## 2024-08-22 DIAGNOSIS — E11.9 TYPE 2 DIABETES MELLITUS WITHOUT COMPLICATION, WITH LONG-TERM CURRENT USE OF INSULIN (HCC): ICD-10-CM

## 2024-08-22 DIAGNOSIS — Z79.4 TYPE 2 DIABETES MELLITUS WITHOUT COMPLICATION, WITH LONG-TERM CURRENT USE OF INSULIN (HCC): ICD-10-CM

## 2024-08-22 DIAGNOSIS — M53.3 SI (SACROILIAC) JOINT DYSFUNCTION: ICD-10-CM

## 2024-08-22 DIAGNOSIS — M51.36 DEGENERATIVE DISC DISEASE, LUMBAR: ICD-10-CM

## 2024-08-22 RX ORDER — MELOXICAM 7.5 MG/1
7.5 TABLET ORAL DAILY
Qty: 90 TABLET | Refills: 1 | Status: SHIPPED | OUTPATIENT
Start: 2024-08-22

## 2024-08-22 RX ORDER — LISINOPRIL 10 MG/1
10 TABLET ORAL DAILY
Qty: 30 TABLET | Refills: 5 | Status: SHIPPED | OUTPATIENT
Start: 2024-08-22

## 2024-08-22 RX ORDER — CYCLOBENZAPRINE HCL 5 MG
5 TABLET ORAL 3 TIMES DAILY PRN
Qty: 30 TABLET | Refills: 0 | OUTPATIENT
Start: 2024-08-22

## 2024-08-22 NOTE — TELEPHONE ENCOUNTER
Reason for call:   [x] Refill   [] Prior Auth  [] Other:     Office:   [x] PCP/Provider -   [] Specialty/Provider -     Medication:     Meloxicam 7.5 mg tablet taken by mouth once daily #90 tabs     Lisinopril 10 mg tablet taken by mouth once daily #90 tabs     Cyclobenzaprine 5 mg tablet taken by mouth 3x daily PRN for muscle spasms #30 tabs     Pharmacy: [id number] - [place]  [address] [phone]     Does the patient have enough for 3 days?   [] Yes   [x] No - Send as HP to POD

## 2024-09-11 LAB
LEFT EYE DIABETIC RETINOPATHY: NORMAL
RIGHT EYE DIABETIC RETINOPATHY: NORMAL

## 2024-09-12 DIAGNOSIS — E11.9 TYPE 2 DIABETES MELLITUS WITHOUT COMPLICATION, WITH LONG-TERM CURRENT USE OF INSULIN (HCC): ICD-10-CM

## 2024-09-12 DIAGNOSIS — Z79.4 TYPE 2 DIABETES MELLITUS WITH DIABETIC POLYNEUROPATHY, WITH LONG-TERM CURRENT USE OF INSULIN (HCC): ICD-10-CM

## 2024-09-12 DIAGNOSIS — E11.42 TYPE 2 DIABETES MELLITUS WITH DIABETIC POLYNEUROPATHY, WITH LONG-TERM CURRENT USE OF INSULIN (HCC): ICD-10-CM

## 2024-09-12 DIAGNOSIS — Z79.4 TYPE 2 DIABETES MELLITUS WITHOUT COMPLICATION, WITH LONG-TERM CURRENT USE OF INSULIN (HCC): ICD-10-CM

## 2024-09-12 DIAGNOSIS — M54.50 CHRONIC BILATERAL LOW BACK PAIN, UNSPECIFIED WHETHER SCIATICA PRESENT: ICD-10-CM

## 2024-09-12 DIAGNOSIS — J12.82 PNEUMONIA DUE TO COVID-19 VIRUS: ICD-10-CM

## 2024-09-12 DIAGNOSIS — M51.36 DEGENERATIVE DISC DISEASE, LUMBAR: ICD-10-CM

## 2024-09-12 DIAGNOSIS — G89.29 CHRONIC BILATERAL LOW BACK PAIN, UNSPECIFIED WHETHER SCIATICA PRESENT: ICD-10-CM

## 2024-09-12 DIAGNOSIS — L30.9 ECZEMA, UNSPECIFIED TYPE: ICD-10-CM

## 2024-09-12 DIAGNOSIS — E11.42 DIABETIC POLYNEUROPATHY ASSOCIATED WITH TYPE 2 DIABETES MELLITUS (HCC): ICD-10-CM

## 2024-09-12 DIAGNOSIS — U07.1 PNEUMONIA DUE TO COVID-19 VIRUS: ICD-10-CM

## 2024-09-12 DIAGNOSIS — M53.3 SI (SACROILIAC) JOINT DYSFUNCTION: ICD-10-CM

## 2024-09-12 DIAGNOSIS — E11.59 TYPE 2 DIABETES MELLITUS WITH OTHER CIRCULATORY COMPLICATION, WITHOUT LONG-TERM CURRENT USE OF INSULIN (HCC): ICD-10-CM

## 2024-09-12 RX ORDER — MOMETASONE FUROATE 1 MG/G
CREAM TOPICAL DAILY
Qty: 50 G | Refills: 0 | Status: SHIPPED | OUTPATIENT
Start: 2024-09-12

## 2024-09-12 RX ORDER — CYCLOBENZAPRINE HCL 5 MG
5 TABLET ORAL 3 TIMES DAILY PRN
Qty: 30 TABLET | Refills: 0 | Status: SHIPPED | OUTPATIENT
Start: 2024-09-12

## 2024-09-12 RX ORDER — MELOXICAM 7.5 MG/1
7.5 TABLET ORAL DAILY
Qty: 90 TABLET | Refills: 1 | Status: SHIPPED | OUTPATIENT
Start: 2024-09-12

## 2024-09-12 RX ORDER — GABAPENTIN 300 MG/1
300 CAPSULE ORAL 3 TIMES DAILY
Qty: 90 CAPSULE | Refills: 1 | Status: SHIPPED | OUTPATIENT
Start: 2024-09-12 | End: 2024-12-11

## 2024-09-12 RX ORDER — PEN NEEDLE, DIABETIC 32GX 5/32"
NEEDLE, DISPOSABLE MISCELLANEOUS
Qty: 100 EACH | Refills: 3 | Status: SHIPPED | OUTPATIENT
Start: 2024-09-12

## 2024-09-12 RX ORDER — INSULIN DEGLUDEC 100 U/ML
20 INJECTION, SOLUTION SUBCUTANEOUS DAILY
Qty: 15 ML | Refills: 1 | Status: SHIPPED | OUTPATIENT
Start: 2024-09-12

## 2024-09-12 RX ORDER — LISINOPRIL 10 MG/1
10 TABLET ORAL DAILY
Qty: 90 TABLET | Refills: 1 | Status: SHIPPED | OUTPATIENT
Start: 2024-09-12

## 2024-10-16 ENCOUNTER — TELEPHONE (OUTPATIENT)
Dept: FAMILY MEDICINE CLINIC | Facility: CLINIC | Age: 39
End: 2024-10-16

## 2024-10-16 NOTE — TELEPHONE ENCOUNTER
----- Message from Sophie JEREZ sent at 10/16/2024 10:36 AM EDT -----  Regarding: DM Follow Up  Please schedule DM Follow Up. Ty

## 2024-10-31 DIAGNOSIS — E11.59 TYPE 2 DIABETES MELLITUS WITH OTHER CIRCULATORY COMPLICATION, WITHOUT LONG-TERM CURRENT USE OF INSULIN (HCC): ICD-10-CM

## 2024-10-31 DIAGNOSIS — Z79.4 TYPE 2 DIABETES MELLITUS WITHOUT COMPLICATION, WITH LONG-TERM CURRENT USE OF INSULIN (HCC): ICD-10-CM

## 2024-10-31 DIAGNOSIS — E11.9 TYPE 2 DIABETES MELLITUS WITHOUT COMPLICATION, WITH LONG-TERM CURRENT USE OF INSULIN (HCC): ICD-10-CM

## 2024-10-31 DIAGNOSIS — M53.3 SI (SACROILIAC) JOINT DYSFUNCTION: ICD-10-CM

## 2024-10-31 DIAGNOSIS — M51.369 DEGENERATIVE DISC DISEASE, LUMBAR: ICD-10-CM

## 2024-10-31 RX ORDER — METFORMIN HYDROCHLORIDE 500 MG/1
1000 TABLET, EXTENDED RELEASE ORAL
Qty: 60 TABLET | Refills: 5 | Status: SHIPPED | OUTPATIENT
Start: 2024-10-31 | End: 2025-04-29

## 2024-10-31 RX ORDER — LISINOPRIL 10 MG/1
10 TABLET ORAL DAILY
Qty: 30 TABLET | Refills: 5 | Status: SHIPPED | OUTPATIENT
Start: 2024-10-31

## 2024-10-31 RX ORDER — MELOXICAM 7.5 MG/1
7.5 TABLET ORAL DAILY
Qty: 30 TABLET | Refills: 5 | Status: SHIPPED | OUTPATIENT
Start: 2024-10-31

## 2024-10-31 RX ORDER — INSULIN DEGLUDEC 100 U/ML
20 INJECTION, SOLUTION SUBCUTANEOUS DAILY
Qty: 15 ML | Refills: 1 | Status: SHIPPED | OUTPATIENT
Start: 2024-10-31

## 2024-11-05 RX ORDER — CYCLOBENZAPRINE HCL 5 MG
5 TABLET ORAL 3 TIMES DAILY PRN
Qty: 30 TABLET | Refills: 0 | Status: SHIPPED | OUTPATIENT
Start: 2024-11-05

## 2024-11-11 ENCOUNTER — TELEPHONE (OUTPATIENT)
Dept: FAMILY MEDICINE CLINIC | Facility: CLINIC | Age: 39
End: 2024-11-11

## 2024-11-11 NOTE — TELEPHONE ENCOUNTER
----- Message from Sophie JEREZ sent at 11/11/2024  3:48 PM EST -----  Regarding: DM Follow Up  Can you please call patient to schedule DM follow up. Ty

## 2024-12-03 ENCOUNTER — TELEPHONE (OUTPATIENT)
Dept: FAMILY MEDICINE CLINIC | Facility: CLINIC | Age: 39
End: 2024-12-03

## 2024-12-03 DIAGNOSIS — Z79.4 TYPE 2 DIABETES MELLITUS WITHOUT COMPLICATION, WITH LONG-TERM CURRENT USE OF INSULIN (HCC): ICD-10-CM

## 2024-12-03 DIAGNOSIS — E11.9 TYPE 2 DIABETES MELLITUS WITHOUT COMPLICATION, WITH LONG-TERM CURRENT USE OF INSULIN (HCC): ICD-10-CM

## 2024-12-03 RX ORDER — INSULIN DEGLUDEC 100 U/ML
20 INJECTION, SOLUTION SUBCUTANEOUS DAILY
Qty: 15 ML | Refills: 1 | Status: SHIPPED | OUTPATIENT
Start: 2024-12-03

## 2024-12-03 NOTE — TELEPHONE ENCOUNTER
PA for     insulin degludec (Tresiba FlexTouch) 100 units/mL injection pen     SUBMITTED to [facility]    via    [x]CMM-KEY: [id number]  []Surescripts-Case ID #   []Availity-Auth ID # NDC #   []Faxed to plan   []Other website   []Phone call Case ID #     [x]PA sent as URGENT    All office notes, labs and other pertaining documents and studies sent. Clinical questions answered. Awaiting determination from insurance company.     Turnaround time for your insurance to make a decision on your Prior Authorization can take 7-21 business days.

## 2024-12-03 NOTE — TELEPHONE ENCOUNTER
Fax received with [place] ppwrk for Tresiba // scanned into this t/c, PA in process, awaiting insurance determination

## 2025-01-13 DIAGNOSIS — M51.369 DEGENERATIVE DISC DISEASE, LUMBAR: ICD-10-CM

## 2025-01-15 RX ORDER — CYCLOBENZAPRINE HCL 5 MG
5 TABLET ORAL 3 TIMES DAILY PRN
Qty: 30 TABLET | Refills: 0 | Status: SHIPPED | OUTPATIENT
Start: 2025-01-15

## 2025-01-16 ENCOUNTER — TELEPHONE (OUTPATIENT)
Dept: FAMILY MEDICINE CLINIC | Facility: CLINIC | Age: 40
End: 2025-01-16

## 2025-01-16 DIAGNOSIS — E11.42 TYPE 2 DIABETES MELLITUS WITH DIABETIC POLYNEUROPATHY, WITH LONG-TERM CURRENT USE OF INSULIN (HCC): Primary | ICD-10-CM

## 2025-01-16 DIAGNOSIS — Z79.4 TYPE 2 DIABETES MELLITUS WITH DIABETIC POLYNEUROPATHY, WITH LONG-TERM CURRENT USE OF INSULIN (HCC): Primary | ICD-10-CM

## 2025-03-21 ENCOUNTER — APPOINTMENT (OUTPATIENT)
Dept: URGENT CARE | Facility: CLINIC | Age: 40
End: 2025-03-21
Payer: COMMERCIAL

## 2025-03-21 ENCOUNTER — APPOINTMENT (OUTPATIENT)
Dept: LAB | Facility: CLINIC | Age: 40
End: 2025-03-21
Payer: COMMERCIAL

## 2025-03-21 DIAGNOSIS — Z79.4 TYPE 2 DIABETES MELLITUS WITH DIABETIC POLYNEUROPATHY, WITH LONG-TERM CURRENT USE OF INSULIN (HCC): ICD-10-CM

## 2025-03-21 DIAGNOSIS — E11.42 TYPE 2 DIABETES MELLITUS WITH DIABETIC POLYNEUROPATHY, WITH LONG-TERM CURRENT USE OF INSULIN (HCC): ICD-10-CM

## 2025-03-21 LAB
ALBUMIN SERPL BCG-MCNC: 4.3 G/DL (ref 3.5–5)
ALP SERPL-CCNC: 62 U/L (ref 34–104)
ALT SERPL W P-5'-P-CCNC: 28 U/L (ref 7–52)
ANION GAP SERPL CALCULATED.3IONS-SCNC: 8 MMOL/L (ref 4–13)
AST SERPL W P-5'-P-CCNC: 21 U/L (ref 13–39)
BASOPHILS # BLD AUTO: 0.07 THOUSANDS/ÂΜL (ref 0–0.1)
BASOPHILS NFR BLD AUTO: 1 % (ref 0–1)
BILIRUB SERPL-MCNC: 0.37 MG/DL (ref 0.2–1)
BUN SERPL-MCNC: 13 MG/DL (ref 5–25)
CALCIUM SERPL-MCNC: 9.4 MG/DL (ref 8.4–10.2)
CHLORIDE SERPL-SCNC: 104 MMOL/L (ref 96–108)
CHOLEST SERPL-MCNC: 172 MG/DL (ref ?–200)
CO2 SERPL-SCNC: 25 MMOL/L (ref 21–32)
CREAT SERPL-MCNC: 0.71 MG/DL (ref 0.6–1.3)
CREAT UR-MCNC: 491.4 MG/DL
EOSINOPHIL # BLD AUTO: 0.44 THOUSAND/ÂΜL (ref 0–0.61)
EOSINOPHIL NFR BLD AUTO: 4 % (ref 0–6)
ERYTHROCYTE [DISTWIDTH] IN BLOOD BY AUTOMATED COUNT: 12.4 % (ref 11.6–15.1)
EST. AVERAGE GLUCOSE BLD GHB EST-MCNC: 226 MG/DL
GFR SERPL CREATININE-BSD FRML MDRD: 118 ML/MIN/1.73SQ M
GLUCOSE P FAST SERPL-MCNC: 153 MG/DL (ref 65–99)
HBA1C MFR BLD: 9.5 %
HCT VFR BLD AUTO: 49.2 % (ref 36.5–49.3)
HDLC SERPL-MCNC: 29 MG/DL
HGB BLD-MCNC: 16.3 G/DL (ref 12–17)
IMM GRANULOCYTES # BLD AUTO: 0.08 THOUSAND/UL (ref 0–0.2)
IMM GRANULOCYTES NFR BLD AUTO: 1 % (ref 0–2)
LDLC SERPL CALC-MCNC: 94 MG/DL (ref 0–100)
LYMPHOCYTES # BLD AUTO: 3.45 THOUSANDS/ÂΜL (ref 0.6–4.47)
LYMPHOCYTES NFR BLD AUTO: 30 % (ref 14–44)
MCH RBC QN AUTO: 30.1 PG (ref 26.8–34.3)
MCHC RBC AUTO-ENTMCNC: 33.1 G/DL (ref 31.4–37.4)
MCV RBC AUTO: 91 FL (ref 82–98)
MICROALBUMIN UR-MCNC: 1262.6 MG/L
MICROALBUMIN/CREAT 24H UR: 257 MG/G CREATININE (ref 0–30)
MONOCYTES # BLD AUTO: 0.85 THOUSAND/ÂΜL (ref 0.17–1.22)
MONOCYTES NFR BLD AUTO: 7 % (ref 4–12)
NEUTROPHILS # BLD AUTO: 6.59 THOUSANDS/ÂΜL (ref 1.85–7.62)
NEUTS SEG NFR BLD AUTO: 57 % (ref 43–75)
NRBC BLD AUTO-RTO: 0 /100 WBCS
PLATELET # BLD AUTO: 271 THOUSANDS/UL (ref 149–390)
PMV BLD AUTO: 10.2 FL (ref 8.9–12.7)
POTASSIUM SERPL-SCNC: 3.9 MMOL/L (ref 3.5–5.3)
PROT SERPL-MCNC: 7.7 G/DL (ref 6.4–8.4)
RBC # BLD AUTO: 5.41 MILLION/UL (ref 3.88–5.62)
SODIUM SERPL-SCNC: 137 MMOL/L (ref 135–147)
TRIGL SERPL-MCNC: 247 MG/DL (ref ?–150)
TSH SERPL DL<=0.05 MIU/L-ACNC: 1.56 UIU/ML (ref 0.45–4.5)
WBC # BLD AUTO: 11.48 THOUSAND/UL (ref 4.31–10.16)

## 2025-03-21 PROCEDURE — 80053 COMPREHEN METABOLIC PANEL: CPT

## 2025-03-21 PROCEDURE — 84443 ASSAY THYROID STIM HORMONE: CPT

## 2025-03-21 PROCEDURE — 82043 UR ALBUMIN QUANTITATIVE: CPT

## 2025-03-21 PROCEDURE — 80061 LIPID PANEL: CPT

## 2025-03-21 PROCEDURE — 83036 HEMOGLOBIN GLYCOSYLATED A1C: CPT

## 2025-03-21 PROCEDURE — 36415 COLL VENOUS BLD VENIPUNCTURE: CPT

## 2025-03-21 PROCEDURE — 82570 ASSAY OF URINE CREATININE: CPT

## 2025-03-21 PROCEDURE — 85025 COMPLETE CBC W/AUTO DIFF WBC: CPT

## 2025-03-24 ENCOUNTER — TELEPHONE (OUTPATIENT)
Dept: FAMILY MEDICINE CLINIC | Facility: CLINIC | Age: 40
End: 2025-03-24

## 2025-03-24 NOTE — TELEPHONE ENCOUNTER
Left message for patient to call the office with current insurance information to complete Pre Reg.

## 2025-03-25 ENCOUNTER — RESULTS FOLLOW-UP (OUTPATIENT)
Dept: FAMILY MEDICINE CLINIC | Facility: CLINIC | Age: 40
End: 2025-03-25

## 2025-04-26 DIAGNOSIS — M51.369 DEGENERATIVE DISC DISEASE, LUMBAR: ICD-10-CM

## 2025-04-26 DIAGNOSIS — L30.9 ECZEMA, UNSPECIFIED TYPE: ICD-10-CM

## 2025-04-29 RX ORDER — MOMETASONE FUROATE 1 MG/G
CREAM TOPICAL
Qty: 45 G | OUTPATIENT
Start: 2025-04-29

## 2025-04-29 RX ORDER — CYCLOBENZAPRINE HCL 5 MG
5 TABLET ORAL 3 TIMES DAILY PRN
Qty: 30 TABLET | Refills: 0 | OUTPATIENT
Start: 2025-04-29

## 2025-05-05 DIAGNOSIS — E11.9 TYPE 2 DIABETES MELLITUS WITHOUT COMPLICATION, WITH LONG-TERM CURRENT USE OF INSULIN (HCC): ICD-10-CM

## 2025-05-05 DIAGNOSIS — Z79.4 TYPE 2 DIABETES MELLITUS WITHOUT COMPLICATION, WITH LONG-TERM CURRENT USE OF INSULIN (HCC): ICD-10-CM

## 2025-05-06 RX ORDER — LISINOPRIL 10 MG/1
10 TABLET ORAL DAILY
Qty: 30 TABLET | Refills: 5 | Status: SHIPPED | OUTPATIENT
Start: 2025-05-06

## 2025-05-18 DIAGNOSIS — E11.59 TYPE 2 DIABETES MELLITUS WITH OTHER CIRCULATORY COMPLICATION, WITHOUT LONG-TERM CURRENT USE OF INSULIN (HCC): ICD-10-CM

## 2025-05-20 RX ORDER — METFORMIN HYDROCHLORIDE 500 MG/1
1000 TABLET, EXTENDED RELEASE ORAL
Qty: 60 TABLET | Refills: 5 | Status: SHIPPED | OUTPATIENT
Start: 2025-05-20

## 2025-05-30 ENCOUNTER — VBI (OUTPATIENT)
Dept: ADMINISTRATIVE | Facility: OTHER | Age: 40
End: 2025-05-30

## 2025-05-30 NOTE — TELEPHONE ENCOUNTER
05/30/25 3:05 PM    Patient was called to schedule a diabetic follow up apointment ; a message was left for the patient to return the call.    Thank you.  Handy Smith MA  [facility]

## 2025-06-14 ENCOUNTER — HOSPITAL ENCOUNTER (EMERGENCY)
Facility: HOSPITAL | Age: 40
Discharge: HOME | End: 2025-06-14
Attending: EMERGENCY MEDICINE | Admitting: EMERGENCY MEDICINE
Payer: COMMERCIAL

## 2025-06-14 ENCOUNTER — APPOINTMENT (EMERGENCY)
Dept: RADIOLOGY | Facility: HOSPITAL | Age: 40
End: 2025-06-14
Payer: COMMERCIAL

## 2025-06-14 VITALS
BODY MASS INDEX: 33.83 KG/M2 | HEIGHT: 69 IN | OXYGEN SATURATION: 98 % | HEART RATE: 66 BPM | TEMPERATURE: 97.6 F | DIASTOLIC BLOOD PRESSURE: 86 MMHG | RESPIRATION RATE: 16 BRPM | SYSTOLIC BLOOD PRESSURE: 147 MMHG | WEIGHT: 228.4 LBS

## 2025-06-14 DIAGNOSIS — R07.9 LEFT-SIDED CHEST PAIN: Primary | ICD-10-CM

## 2025-06-14 LAB
2HR DELTA HS TROPONIN: 0 NG/L
ALBUMIN SERPL BCG-MCNC: 4.1 G/DL (ref 3.5–5)
ALP SERPL-CCNC: 53 U/L (ref 34–104)
ALT SERPL W P-5'-P-CCNC: 19 U/L (ref 7–52)
ANION GAP SERPL CALCULATED.3IONS-SCNC: 7 MMOL/L (ref 4–13)
AST SERPL W P-5'-P-CCNC: 17 U/L (ref 13–39)
BASOPHILS # BLD AUTO: 0.06 THOUSANDS/ÂΜL (ref 0–0.1)
BASOPHILS NFR BLD AUTO: 1 % (ref 0–1)
BILIRUB DIRECT SERPL-MCNC: 0.09 MG/DL (ref 0–0.2)
BILIRUB SERPL-MCNC: 0.68 MG/DL (ref 0.2–1)
BUN SERPL-MCNC: 13 MG/DL (ref 5–25)
CALCIUM SERPL-MCNC: 9.1 MG/DL (ref 8.4–10.2)
CARDIAC TROPONIN I PNL SERPL HS: 6 NG/L (ref ?–50)
CARDIAC TROPONIN I PNL SERPL HS: 6 NG/L (ref ?–50)
CHLORIDE SERPL-SCNC: 104 MMOL/L (ref 96–108)
CO2 SERPL-SCNC: 25 MMOL/L (ref 21–32)
CREAT SERPL-MCNC: 0.76 MG/DL (ref 0.6–1.3)
EOSINOPHIL # BLD AUTO: 0.18 THOUSAND/ÂΜL (ref 0–0.61)
EOSINOPHIL NFR BLD AUTO: 2 % (ref 0–6)
ERYTHROCYTE [DISTWIDTH] IN BLOOD BY AUTOMATED COUNT: 12 % (ref 11.6–15.1)
GFR SERPL CREATININE-BSD FRML MDRD: 114 ML/MIN/1.73SQ M
GLUCOSE SERPL-MCNC: 245 MG/DL (ref 65–140)
HCT VFR BLD AUTO: 43.6 % (ref 36.5–49.3)
HGB BLD-MCNC: 14.9 G/DL (ref 12–17)
IMM GRANULOCYTES # BLD AUTO: 0.09 THOUSAND/UL (ref 0–0.2)
IMM GRANULOCYTES NFR BLD AUTO: 1 % (ref 0–2)
LYMPHOCYTES # BLD AUTO: 2.72 THOUSANDS/ÂΜL (ref 0.6–4.47)
LYMPHOCYTES NFR BLD AUTO: 22 % (ref 14–44)
MAGNESIUM SERPL-MCNC: 1.8 MG/DL (ref 1.9–2.7)
MCH RBC QN AUTO: 29.2 PG (ref 26.8–34.3)
MCHC RBC AUTO-ENTMCNC: 34.2 G/DL (ref 31.4–37.4)
MCV RBC AUTO: 86 FL (ref 82–98)
MONOCYTES # BLD AUTO: 1.04 THOUSAND/ÂΜL (ref 0.17–1.22)
MONOCYTES NFR BLD AUTO: 9 % (ref 4–12)
NEUTROPHILS # BLD AUTO: 8.12 THOUSANDS/ÂΜL (ref 1.85–7.62)
NEUTS SEG NFR BLD AUTO: 65 % (ref 43–75)
NRBC BLD AUTO-RTO: 0 /100 WBCS
PLATELET # BLD AUTO: 235 THOUSANDS/UL (ref 149–390)
PMV BLD AUTO: 9.6 FL (ref 8.9–12.7)
POTASSIUM SERPL-SCNC: 3.7 MMOL/L (ref 3.5–5.3)
PROT SERPL-MCNC: 7.7 G/DL (ref 6.4–8.4)
RBC # BLD AUTO: 5.1 MILLION/UL (ref 3.88–5.62)
SODIUM SERPL-SCNC: 136 MMOL/L (ref 135–147)
WBC # BLD AUTO: 12.21 THOUSAND/UL (ref 4.31–10.16)

## 2025-06-14 PROCEDURE — 99285 EMERGENCY DEPT VISIT HI MDM: CPT

## 2025-06-14 PROCEDURE — 83735 ASSAY OF MAGNESIUM: CPT | Performed by: EMERGENCY MEDICINE

## 2025-06-14 PROCEDURE — 85025 COMPLETE CBC W/AUTO DIFF WBC: CPT | Performed by: EMERGENCY MEDICINE

## 2025-06-14 PROCEDURE — 99285 EMERGENCY DEPT VISIT HI MDM: CPT | Performed by: EMERGENCY MEDICINE

## 2025-06-14 PROCEDURE — 96365 THER/PROPH/DIAG IV INF INIT: CPT

## 2025-06-14 PROCEDURE — 93005 ELECTROCARDIOGRAM TRACING: CPT

## 2025-06-14 PROCEDURE — 96366 THER/PROPH/DIAG IV INF ADDON: CPT

## 2025-06-14 PROCEDURE — 96361 HYDRATE IV INFUSION ADD-ON: CPT

## 2025-06-14 PROCEDURE — 80076 HEPATIC FUNCTION PANEL: CPT | Performed by: EMERGENCY MEDICINE

## 2025-06-14 PROCEDURE — 36415 COLL VENOUS BLD VENIPUNCTURE: CPT | Performed by: EMERGENCY MEDICINE

## 2025-06-14 PROCEDURE — 96375 TX/PRO/DX INJ NEW DRUG ADDON: CPT

## 2025-06-14 PROCEDURE — 71046 X-RAY EXAM CHEST 2 VIEWS: CPT

## 2025-06-14 PROCEDURE — 84484 ASSAY OF TROPONIN QUANT: CPT | Performed by: EMERGENCY MEDICINE

## 2025-06-14 PROCEDURE — 80048 BASIC METABOLIC PNL TOTAL CA: CPT | Performed by: EMERGENCY MEDICINE

## 2025-06-14 RX ORDER — LIDOCAINE 50 MG/G
1 PATCH TOPICAL ONCE
Status: DISCONTINUED | OUTPATIENT
Start: 2025-06-14 | End: 2025-06-14 | Stop reason: HOSPADM

## 2025-06-14 RX ORDER — KETOROLAC TROMETHAMINE 30 MG/ML
15 INJECTION, SOLUTION INTRAMUSCULAR; INTRAVENOUS ONCE
Status: COMPLETED | OUTPATIENT
Start: 2025-06-14 | End: 2025-06-14

## 2025-06-14 RX ORDER — ASPIRIN 81 MG/1
324 TABLET, CHEWABLE ORAL ONCE
Status: COMPLETED | OUTPATIENT
Start: 2025-06-14 | End: 2025-06-14

## 2025-06-14 RX ORDER — KETOROLAC TROMETHAMINE 30 MG/ML
15 INJECTION, SOLUTION INTRAMUSCULAR; INTRAVENOUS ONCE
Status: DISCONTINUED | OUTPATIENT
Start: 2025-06-14 | End: 2025-06-14

## 2025-06-14 RX ORDER — ACETAMINOPHEN 325 MG/1
975 TABLET ORAL ONCE
Status: COMPLETED | OUTPATIENT
Start: 2025-06-14 | End: 2025-06-14

## 2025-06-14 RX ORDER — MAGNESIUM SULFATE HEPTAHYDRATE 40 MG/ML
2 INJECTION, SOLUTION INTRAVENOUS ONCE
Status: COMPLETED | OUTPATIENT
Start: 2025-06-14 | End: 2025-06-14

## 2025-06-14 RX ADMIN — MAGNESIUM SULFATE HEPTAHYDRATE 2 G: 40 INJECTION, SOLUTION INTRAVENOUS at 16:24

## 2025-06-14 RX ADMIN — KETOROLAC TROMETHAMINE 15 MG: 30 INJECTION, SOLUTION INTRAMUSCULAR at 17:00

## 2025-06-14 RX ADMIN — SODIUM CHLORIDE 1000 ML: 0.9 INJECTION, SOLUTION INTRAVENOUS at 15:39

## 2025-06-14 RX ADMIN — LIDOCAINE 5% 1 PATCH: 700 PATCH TOPICAL at 15:39

## 2025-06-14 RX ADMIN — ACETAMINOPHEN 975 MG: 325 TABLET ORAL at 15:39

## 2025-06-14 RX ADMIN — ASPIRIN 324 MG: 81 TABLET, CHEWABLE ORAL at 15:39

## 2025-06-14 NOTE — ED PROVIDER NOTES
Time reflects when diagnosis was documented in both MDM as applicable and the Disposition within this note       Time User Action Codes Description Comment    6/14/2025  6:49 PM Charito Oneill Add [R07.9] Left-sided chest pain           ED Disposition       ED Disposition   Discharge    Condition   Stable    Date/Time   Sat Jun 14, 2025  6:49 PM    Comment   Salinas Moralez discharge to home/self care.                   Assessment & Plan       Medical Decision Making  40-year-old male presents to the ED for constant sharp chest pain mostly central and left-sided for the past 1.5 weeks.  No significant alleviating or aggravating factors.  No worsening with exertion and no associated symptoms.  Overall low suspicion for cardiac etiology such as acute coronary syndrome or unstable angina however patient does have cardiac risk factors including hypertension and diabetes will workup with cardiac labs.  I do suspect this could be musculoskeletal etiology such as muscle strain, costochondritis.  Other considerations include pericarditis given the recent coughing.  Pneumonia also considered but less likely.  Very low clinical suspicion for PE and patient is low risk via Wells criteria and PERC negative.  Will provide aspirin, Tylenol, lidocaine patch for symptom relief.  Will keep for 2-hour delta troponin and if workup negative, refer to cardiology.    Amount and/or Complexity of Data Reviewed  Labs: ordered. Decision-making details documented in ED Course.  Radiology: ordered and independent interpretation performed. Decision-making details documented in ED Course.  ECG/medicine tests: ordered and independent interpretation performed. Decision-making details documented in ED Course.    Risk  OTC drugs.  Prescription drug management.        ED Course as of 06/14/25 1849   Sat Jun 14, 2025   1613 hs TnI 0hr: 6   1639 Patient reassessed and updated him of work up thus far including normal initial troponin, elevated glucose.  Patient reports some improvement but only mild, initially rated pain on arrival 9/10 in severity, now rates it a 7/10 severity.   1835 hs TnI 2hr: 6   1848 Updated patient about normal repeat EKG/troponin. Patient has had improvement in pain. Recommended he take full dose ASA daily for antiinflammatory effects and tylenol PRN. Will refer to cardiology for consideration of stress test (HEART score 2). Discussed ED return parameters.        Medications   lidocaine (LIDODERM) 5 % patch 1 patch (1 patch Topical Medication Applied 6/14/25 1539)   sodium chloride 0.9 % bolus 1,000 mL (1,000 mL Intravenous New Bag 6/14/25 1539)   aspirin chewable tablet 324 mg (324 mg Oral Given 6/14/25 1539)   acetaminophen (TYLENOL) tablet 975 mg (975 mg Oral Given 6/14/25 1539)   magnesium sulfate 2 g/50 mL IVPB (premix) 2 g (2 g Intravenous New Bag 6/14/25 1624)   ketorolac (TORADOL) injection 15 mg (15 mg Intravenous Given 6/14/25 1700)       ED Risk Strat Scores   HEART Risk Score      Flowsheet Row Most Recent Value   Heart Score Risk Calculator    History 0 Filed at: 06/14/2025 1632   ECG 0 Filed at: 06/14/2025 1632   Age 0 Filed at: 06/14/2025 1632   Risk Factors 2 Filed at: 06/14/2025 1632   Troponin 0 Filed at: 06/14/2025 1632   HEART Score 2 Filed at: 06/14/2025 1632          HEART Risk Score      Flowsheet Row Most Recent Value   Heart Score Risk Calculator    History 0 Filed at: 06/14/2025 1632   ECG 0 Filed at: 06/14/2025 1632   Age 0 Filed at: 06/14/2025 1632   Risk Factors 2 Filed at: 06/14/2025 1632   Troponin 0 Filed at: 06/14/2025 1632   HEART Score 2 Filed at: 06/14/2025 1632                      No data recorded    PERC Rule for PE      Flowsheet Row Most Recent Value   PERC Rule for PE    Age >=50 0 Filed at: 06/14/2025 1632   HR >=100 0 Filed at: 06/14/2025 1632   O2 Sat on room air < 95% 0 Filed at: 06/14/2025 1632   History of PE or DVT 0 Filed at: 06/14/2025 1632   Recent trauma or surgery 0 Filed at: 06/14/2025  1632   Hemoptysis 0 Filed at: 06/14/2025 1632   Exogenous estrogen 0 Filed at: 06/14/2025 1632   Unilateral leg swelling 0 Filed at: 06/14/2025 1632   PERC Rule for PE Results 0 Filed at: 06/14/2025 1632            SBIRT 20yo+      Flowsheet Row Most Recent Value   Initial Alcohol Screen: US AUDIT-C     1. How often do you have a drink containing alcohol? 0 Filed at: 06/14/2025 1517   2. How many drinks containing alcohol do you have on a typical day you are drinking?  0 Filed at: 06/14/2025 1517   3a. Male UNDER 65: How often do you have five or more drinks on one occasion? 0 Filed at: 06/14/2025 1517   3b. FEMALE Any Age, or MALE 65+: How often do you have 4 or more drinks on one occassion? 0 Filed at: 06/14/2025 1517   Audit-C Score 0 Filed at: 06/14/2025 1517   BECCA: How many times in the past year have you...    Used an illegal drug or used a prescription medication for non-medical reasons? Never Filed at: 06/14/2025 1517            Wells' Criteria for PE      Flowsheet Row Most Recent Value   Wells' Criteria for PE    Clinical signs and symptoms of DVT 0 Filed at: 06/14/2025 1632   PE is primary diagnosis or equally likely 0 Filed at: 06/14/2025 1632   HR >100 0 Filed at: 06/14/2025 1632   Immobilization at least 3 days or Surgery in the previous 4 weeks 0 Filed at: 06/14/2025 1632   Previous, objectively diagnosed PE or DVT 0 Filed at: 06/14/2025 1632   Hemoptysis 0 Filed at: 06/14/2025 1632   Malignancy with treatment within 6 months or palliative 0 Filed at: 06/14/2025 1632   Wells' Criteria Total 0 Filed at: 06/14/2025 1632                        History of Present Illness       Chief Complaint   Patient presents with    Chest Pain     Pt c/o constant sharp mid sternal chest pain x 1 week, denies any sob        Past Medical History[1]   Past Surgical History[2]   Family History[3]   Social History[4]   E-Cigarette/Vaping    E-Cigarette Use Never User       E-Cigarette/Vaping Substances    Nicotine No      THC No     CBD No     Flavoring No     Other No     Unknown No       I have reviewed and agree with the history as documented.     Patient is a 40-year-old male with past medical history significant for insulin-dependent type 2 diabetes, hypertension, arthritis, presents to the emergency department complaining of constant non-radiating chest pain that started about 1.5 weeks ago.  Patient states it is on the left side of his chest just off of midline in his mid chest and he describes it as a very sharp pain that has been persistent for the past 1 and half weeks.  He denies ever having this type of pain before or it lasting this long.  He states he has not noticed any aggravating or alleviating factors.  No worsening with exertion.  He states very minimal worsening with deep inhalation but denies any shortness of breath.  He denies any palpitations, sweats, headache, dizziness or near syncope.  He has had a dry cough for the past couple of weeks.  No fevers or chills or other URI symptoms, abdominal pain, back pain worse than baseline, nausea, vomiting, change in bowel habits, urinary symptoms, skin rash or color change, leg pain or swelling, new focal neurologic deficits.  No personal or known family history of any cardiac disease.  No recent travel or prolonged immobilization.  He has not taken anything today for pain but he was taking 81 mg of aspirin throughout the week.  Denies smoking history.      History provided by:  Spouse and patient   used: No    Chest Pain  Associated symptoms: no abdominal pain, no back pain, no cough, no diaphoresis, no dizziness, no fever, no headache, no nausea, no numbness, no palpitations, no shortness of breath, not vomiting and no weakness        Review of Systems   Constitutional:  Negative for chills, diaphoresis and fever.   HENT:  Negative for congestion, ear pain, rhinorrhea and sore throat.    Respiratory:  Negative for cough, chest tightness, shortness  of breath and wheezing.    Cardiovascular:  Positive for chest pain. Negative for palpitations and leg swelling.   Gastrointestinal:  Negative for abdominal pain, constipation, diarrhea, nausea and vomiting.   Genitourinary:  Negative for dysuria, flank pain, frequency and hematuria.   Musculoskeletal:  Negative for back pain, neck pain and neck stiffness.   Skin:  Negative for color change, pallor, rash and wound.   Allergic/Immunologic: Negative for immunocompromised state.   Neurological:  Negative for dizziness, syncope, weakness, light-headedness, numbness and headaches.   Hematological:  Negative for adenopathy. Does not bruise/bleed easily.   Psychiatric/Behavioral:  Negative for confusion and decreased concentration.    All other systems reviewed and are negative.          Objective       ED Triage Vitals   Temperature Pulse Blood Pressure Respirations SpO2 Patient Position - Orthostatic VS   06/14/25 1507 06/14/25 1507 06/14/25 1507 06/14/25 1507 06/14/25 1507 06/14/25 1507   97.6 °F (36.4 °C) 81 161/94 20 99 % Sitting      Temp Source Heart Rate Source BP Location FiO2 (%) Pain Score    06/14/25 1507 06/14/25 1507 06/14/25 1507 -- 06/14/25 1539    Temporal Monitor Left arm  Med Not Given for Pain - for MAR use only      Vitals      Date and Time Temp Pulse SpO2 Resp BP Pain Score FACES Pain Rating User   06/14/25 1830 -- 66 98 % 16 147/86 -- -- SN   06/14/25 1800 -- 69 98 % 16 148/88 -- -- SN   06/14/25 1700 97.6 °F (36.4 °C) 75 98 % 18 151/91 4 -- CA   06/14/25 1630 97.6 °F (36.4 °C) 71 97 % 18 146/93 -- -- CA   06/14/25 1539 -- -- -- -- -- Med Not Given for Pain - for MAR use only -- CA   06/14/25 1507 97.6 °F (36.4 °C) 81 99 % 20 161/94 -- -- CO            Physical Exam  Vitals and nursing note reviewed.   Constitutional:       General: He is not in acute distress.     Appearance: Normal appearance. He is well-developed. He is not ill-appearing, toxic-appearing or diaphoretic.   HENT:      Head:  Normocephalic and atraumatic.      Right Ear: External ear normal.      Left Ear: External ear normal.      Nose: Nose normal.      Mouth/Throat:      Mouth: Mucous membranes are moist.      Pharynx: Oropharynx is clear.     Eyes:      Extraocular Movements: Extraocular movements intact.      Conjunctiva/sclera: Conjunctivae normal.     Neck:      Vascular: No JVD.     Cardiovascular:      Rate and Rhythm: Normal rate and regular rhythm.      Pulses: Normal pulses.      Heart sounds: Normal heart sounds. No murmur heard.     No friction rub. No gallop.   Pulmonary:      Effort: Pulmonary effort is normal. No respiratory distress.      Breath sounds: Normal breath sounds. No wheezing, rhonchi or rales.      Comments: Mild tenderness in the left mid chest, just left of midline.  Chest:      Chest wall: Tenderness present.   Abdominal:      General: Bowel sounds are normal. There is no distension.      Palpations: Abdomen is soft.      Tenderness: There is no abdominal tenderness. There is no guarding or rebound.     Musculoskeletal:         General: No swelling or tenderness. Normal range of motion.      Cervical back: Normal range of motion and neck supple. No rigidity.      Right lower leg: No edema.      Left lower leg: No edema.     Skin:     General: Skin is warm and dry.      Coloration: Skin is not pale.      Findings: No erythema or rash.     Neurological:      General: No focal deficit present.      Mental Status: He is alert and oriented to person, place, and time.      Sensory: No sensory deficit.      Motor: No weakness.     Psychiatric:         Mood and Affect: Mood normal.         Behavior: Behavior normal.         Results Reviewed       Procedure Component Value Units Date/Time    HS Troponin I 2hr [[id number]]  (Normal) Collected: 06/14/25 1801    Lab Status: Final result Specimen: Blood from Arm, Right Updated: 06/14/25 1834     hs TnI 2hr 6 ng/L      Delta 2hr hsTnI 0 ng/L     HS Troponin 0hr (reflex  protocol) [[id number]]  (Normal) Collected: 06/14/25 1537    Lab Status: Final result Specimen: Blood from Arm, Right Updated: 06/14/25 1612     hs TnI 0hr 6 ng/L     Basic metabolic panel [[id number]]  (Abnormal) Collected: 06/14/25 1537    Lab Status: Final result Specimen: Blood from Arm, Right Updated: 06/14/25 1607     Sodium 136 mmol/L      Potassium 3.7 mmol/L      Chloride 104 mmol/L      CO2 25 mmol/L      ANION GAP 7 mmol/L      BUN 13 mg/dL      Creatinine 0.76 mg/dL      Glucose 245 mg/dL      Calcium 9.1 mg/dL      eGFR 114 ml/min/1.73sq m     Narrative:      [employer] guidelines for Chronic Kidney Disease (CKD):     Stage 1 with normal or high GFR (GFR > 90 mL/min/1.73 square meters)    Stage 2 Mild CKD (GFR = 60-89 mL/min/1.73 square meters)    Stage 3A Moderate CKD (GFR = 45-59 mL/min/1.73 square meters)    Stage 3B Moderate CKD (GFR = 30-44 mL/min/1.73 square meters)    Stage 4 Severe CKD (GFR = 15-29 mL/min/1.73 square meters)    Stage 5 End Stage CKD (GFR <15 mL/min/1.73 square meters)  Note: GFR calculation is accurate only with a steady state creatinine    Hepatic function panel [[id number]]  (Normal) Collected: 06/14/25 1537    Lab Status: Final result Specimen: Blood from Arm, Right Updated: 06/14/25 1607     Total Bilirubin 0.68 mg/dL      Bilirubin, Direct 0.09 mg/dL      Alkaline Phosphatase 53 U/L      AST 17 U/L      ALT 19 U/L      Total Protein 7.7 g/dL      Albumin 4.1 g/dL     Magnesium [[id number]]  (Abnormal) Collected: 06/14/25 1537    Lab Status: Final result Specimen: Blood from Arm, Right Updated: 06/14/25 1607     Magnesium 1.8 mg/dL     CBC and differential [[id number]]  (Abnormal) Collected: 06/14/25 1537    Lab Status: Final result Specimen: Blood from Arm, Right Updated: 06/14/25 1549     WBC 12.21 Thousand/uL      RBC 5.10 Million/uL      Hemoglobin 14.9 g/dL      Hematocrit 43.6 %      MCV 86 fL      MCH 29.2 pg      MCHC 34.2 g/dL      RDW 12.0 %       MPV 9.6 fL      Platelets 235 Thousands/uL      nRBC 0 /100 WBCs      Segmented % 65 %      Immature Grans % 1 %      Lymphocytes % 22 %      Monocytes % 9 %      Eosinophils Relative 2 %      Basophils Relative 1 %      Absolute Neutrophils 8.12 Thousands/µL      Absolute Immature Grans 0.09 Thousand/uL      Absolute Lymphocytes 2.72 Thousands/µL      Absolute Monocytes 1.04 Thousand/µL      Eosinophils Absolute 0.18 Thousand/µL      Basophils Absolute 0.06 Thousands/µL             XR chest 2 views   ED Interpretation by Charito Oneill DO (06/14 1658)   No acute abnormality in the chest.          ECG 12 Lead Documentation Only    Date/Time: 6/14/2025 3:13 PM    Performed by: Charito Oneill DO  Authorized by: Charito Oneill DO    ECG reviewed by me, the ED Provider: yes    Patient location:  ED  Previous ECG:     Previous ECG:  Compared to current    Comparison ECG info:  4-8-2021; T wave abnormality no longer present  Rate:     ECG rate:  85    ECG rate assessment: normal    Rhythm:     Rhythm: sinus rhythm    Ectopy:     Ectopy: none    QRS:     QRS axis:  Normal    QRS intervals:  Normal  Conduction:     Conduction: normal    ST segments:     ST segments:  Normal  T waves:     T waves: normal    ECG 12 Lead Documentation Only    Date/Time: 6/14/2025 5:32 PM    Performed by: Charito Oneill DO  Authorized by: Charito Oneill DO    ECG reviewed by me, the ED Provider: yes    Patient location:  ED  Previous ECG:     Previous ECG:  Compared to current    Similarity:  No change  Rate:     ECG rate:  70    ECG rate assessment: normal    Rhythm:     Rhythm: sinus rhythm    Ectopy:     Ectopy: none    QRS:     QRS axis:  Normal    QRS intervals:  Normal  Conduction:     Conduction: normal    ST segments:     ST segments:  Normal  T waves:     T waves: normal        ED Medication and Procedure Management   Prior to Admission Medications   Prescriptions Last Dose Informant  Patient Reported? Taking?   Blood Glucose Monitoring Suppl (OneTouch Verio Reflect) w/Device KIT  Self No No   Sig: Check blood sugars once daily. Please substitute with appropriate alternative as covered by patient's insurance. Dx: E11.65   Diclofenac Sodium (VOLTAREN) 1 %   No No   Sig: Apply 2 g topically 4 (four) times a day   Insulin Pen Needle (BD Pen Needle Margo U/F) 32G X 4 MM MISC   No No   Sig: Use daily as directed with insulin pen   OneTouch Delica Lancets 33G MISC  Self No No   Sig: Check blood sugars once daily. Please substitute with appropriate alternative as covered by patient's insurance. Dx: E11.65   cyclobenzaprine (FLEXERIL) 5 mg tablet   No No   Sig: Take 1 tablet (5 mg total) by mouth 3 (three) times a day as needed for muscle spasms   dulaglutide (Trulicity) 3 MG/0.5ML injection   No No   Sig: Inject 0.5 mL (3 mg total) under the skin every 7 days   gabapentin (NEURONTIN) 300 mg capsule   No No   Sig: take 1 capsule by mouth three times a day   glucose blood (OneTouch Verio) test strip   No No   Sig: Check blood sugars once daily. Please substitute with appropriate alternative as covered by patient's insurance. Dx: E11.65   insulin degludec (Tresiba FlexTouch) 100 units/mL injection pen   No No   Sig: Inject 20 Units under the skin daily   lisinopril (ZESTRIL) 10 mg tablet   No No   Sig: take 1 tablet by mouth once daily   meloxicam (Mobic) 7.5 mg tablet   No No   Sig: Take 1 tablet (7.5 mg total) by mouth daily   metFORMIN (GLUCOPHAGE-XR) 500 mg 24 hr tablet   No No   Sig: take 2 tablets by mouth EVERY MORNING WITH BREAKFAST   mometasone (ELOCON) 0.1 % cream   No No   Sig: Apply topically daily   polyethylene glycol (MIRALAX) 17 g packet   No No   Sig: Take 17 g by mouth daily      Facility-Administered Medications: None     Patient's Medications   Discharge Prescriptions    No medications on file       ED SEPSIS DOCUMENTATION   Time reflects when diagnosis was documented in both MDM as  applicable and the Disposition within this note       Time User Action Codes Description Comment    6/14/2025  6:49 PM Charito Oneill Add [R07.9] Left-sided chest pain                    [1]   Past Medical History:  Diagnosis Date    Arthritis     Diabetes mellitus (HCC)     Hypertension    [2]   Past Surgical History:  Procedure Laterality Date    BACK SURGERY     [3]   Family History  Family history unknown: Yes   [4]   Social History  Tobacco Use    Smoking status: Never    Smokeless tobacco: Never   Vaping Use    Vaping status: Never Used   Substance Use Topics    Alcohol use: Not Currently    Drug use: Never        Charito Oneill DO  06/14/25 1849

## 2025-06-15 LAB
ATRIAL RATE: 70 BPM
ATRIAL RATE: 85 BPM
P AXIS: 49 DEGREES
P AXIS: 51 DEGREES
PR INTERVAL: 180 MS
PR INTERVAL: 198 MS
QRS AXIS: 21 DEGREES
QRS AXIS: 21 DEGREES
QRSD INTERVAL: 84 MS
QRSD INTERVAL: 86 MS
QT INTERVAL: 370 MS
QT INTERVAL: 426 MS
QTC INTERVAL: 440 MS
QTC INTERVAL: 460 MS
T WAVE AXIS: 2 DEGREES
T WAVE AXIS: 24 DEGREES
VENTRICULAR RATE: 70 BPM
VENTRICULAR RATE: 85 BPM

## 2025-06-15 PROCEDURE — 93010 ELECTROCARDIOGRAM REPORT: CPT | Performed by: INTERNAL MEDICINE

## 2025-07-02 ENCOUNTER — OFFICE VISIT (OUTPATIENT)
Dept: FAMILY MEDICINE CLINIC | Facility: CLINIC | Age: 40
End: 2025-07-02
Payer: COMMERCIAL

## 2025-07-02 ENCOUNTER — TELEPHONE (OUTPATIENT)
Age: 40
End: 2025-07-02

## 2025-07-02 VITALS
BODY MASS INDEX: 33.83 KG/M2 | HEIGHT: 69 IN | DIASTOLIC BLOOD PRESSURE: 90 MMHG | WEIGHT: 228.4 LBS | OXYGEN SATURATION: 97 % | RESPIRATION RATE: 16 BRPM | TEMPERATURE: 98.2 F | HEART RATE: 97 BPM | SYSTOLIC BLOOD PRESSURE: 150 MMHG

## 2025-07-02 DIAGNOSIS — E11.42 TYPE 2 DIABETES MELLITUS WITH DIABETIC POLYNEUROPATHY, WITH LONG-TERM CURRENT USE OF INSULIN (HCC): ICD-10-CM

## 2025-07-02 DIAGNOSIS — J96.01 ACUTE RESPIRATORY FAILURE WITH HYPOXIA (HCC): ICD-10-CM

## 2025-07-02 DIAGNOSIS — Z00.00 ANNUAL PHYSICAL EXAM: Primary | ICD-10-CM

## 2025-07-02 DIAGNOSIS — E11.42 DIABETIC POLYNEUROPATHY ASSOCIATED WITH TYPE 2 DIABETES MELLITUS (HCC): ICD-10-CM

## 2025-07-02 DIAGNOSIS — R07.9 CHEST PAIN, UNSPECIFIED TYPE: ICD-10-CM

## 2025-07-02 DIAGNOSIS — M51.369 DEGENERATIVE DISC DISEASE, LUMBAR: ICD-10-CM

## 2025-07-02 DIAGNOSIS — Z79.4 TYPE 2 DIABETES MELLITUS WITH DIABETIC POLYNEUROPATHY, WITH LONG-TERM CURRENT USE OF INSULIN (HCC): ICD-10-CM

## 2025-07-02 DIAGNOSIS — E11.9 TYPE 2 DIABETES MELLITUS WITHOUT COMPLICATION, WITH LONG-TERM CURRENT USE OF INSULIN (HCC): ICD-10-CM

## 2025-07-02 DIAGNOSIS — I15.2 HYPERTENSION DUE TO ENDOCRINE DISORDER: ICD-10-CM

## 2025-07-02 DIAGNOSIS — E11.59 TYPE 2 DIABETES MELLITUS WITH OTHER CIRCULATORY COMPLICATION, WITHOUT LONG-TERM CURRENT USE OF INSULIN (HCC): ICD-10-CM

## 2025-07-02 DIAGNOSIS — Z79.4 TYPE 2 DIABETES MELLITUS WITHOUT COMPLICATION, WITH LONG-TERM CURRENT USE OF INSULIN (HCC): ICD-10-CM

## 2025-07-02 LAB — SL AMB POCT HEMOGLOBIN AIC: 11.1 (ref ?–6.5)

## 2025-07-02 PROCEDURE — 83036 HEMOGLOBIN GLYCOSYLATED A1C: CPT | Performed by: NURSE PRACTITIONER

## 2025-07-02 PROCEDURE — 99214 OFFICE O/P EST MOD 30 MIN: CPT | Performed by: NURSE PRACTITIONER

## 2025-07-02 PROCEDURE — 99395 PREV VISIT EST AGE 18-39: CPT | Performed by: NURSE PRACTITIONER

## 2025-07-02 RX ORDER — INSULIN DEGLUDEC 100 U/ML
20 INJECTION, SOLUTION SUBCUTANEOUS DAILY
Qty: 15 ML | Refills: 1 | Status: SHIPPED | OUTPATIENT
Start: 2025-07-02 | End: 2025-07-02 | Stop reason: SDUPTHER

## 2025-07-02 RX ORDER — METFORMIN HYDROCHLORIDE 500 MG/1
1000 TABLET, EXTENDED RELEASE ORAL
Qty: 60 TABLET | Refills: 5 | Status: SHIPPED | OUTPATIENT
Start: 2025-07-02

## 2025-07-02 RX ORDER — CYCLOBENZAPRINE HCL 5 MG
5 TABLET ORAL DAILY PRN
Qty: 30 TABLET | Refills: 0 | Status: SHIPPED | OUTPATIENT
Start: 2025-07-02

## 2025-07-02 RX ORDER — PEN NEEDLE, DIABETIC 32GX 5/32"
NEEDLE, DISPOSABLE MISCELLANEOUS
Qty: 100 EACH | Refills: 3 | Status: SHIPPED | OUTPATIENT
Start: 2025-07-02

## 2025-07-02 RX ORDER — LISINOPRIL 10 MG/1
10 TABLET ORAL DAILY
Qty: 30 TABLET | Refills: 5 | Status: SHIPPED | OUTPATIENT
Start: 2025-07-02

## 2025-07-02 RX ORDER — INSULIN DEGLUDEC 100 U/ML
20 INJECTION, SOLUTION SUBCUTANEOUS DAILY
Qty: 15 ML | Refills: 1 | Status: SHIPPED | OUTPATIENT
Start: 2025-07-02

## 2025-07-02 RX ORDER — GABAPENTIN 300 MG/1
300 CAPSULE ORAL 3 TIMES DAILY
Qty: 90 CAPSULE | Refills: 1 | Status: SHIPPED | OUTPATIENT
Start: 2025-07-02

## 2025-07-02 NOTE — PROGRESS NOTES
Adult Annual Physical  Name: Salinas Moralez      : 1985      MRN: [id number]  Encounter Provider: PARUL Hernandez  Encounter Date: 2025   Encounter department: [facility] PRIMARY CARE    :  Assessment & Plan  Annual physical exam  Annual physical completed.  Patient reports that he had lost his insurance recently started a new job.  Has not been taking any of his diabetic medication.  Blood work ordered.  Recently seen for chest pain in the ER, continues to have intermittent chest pain.  Echo ordered       Type 2 diabetes mellitus without complication, with long-term current use of insulin (HCC)    Lab Results   Component Value Date    HGBA1C 11.1 (A) 2025   Patient's hemoglobin A1c is very elevated, reports he did not have any health insurance has stopped taking medication.  Medications reordered.  Discussed the importance of maintaining good glucose control.  Reports bilateral foot pain, numbness and tingling.  Encourage to start medication gabapentin ordered    Orders:    insulin degludec (Tresiba FlexTouch) 100 units/mL injection pen; Inject 20 Units under the skin daily    lisinopril (ZESTRIL) 10 mg tablet; Take 1 tablet (10 mg total) by mouth daily    Chest pain, unspecified type  Recently seen in the emergency room for chest pain.  Reports chest pain is intermittent has not been resolved.  Reviewed EKG done in hospital within normal limits.  Will order stress test.  Discussed the possibility of acid reflux.  Orders:    Echo stress test, exercise; Future    Diabetic polyneuropathy associated with type 2 diabetes mellitus (HCC)    Lab Results   Component Value Date    HGBA1C 11.1 (A) 2025     Patient's hemoglobin A1c is elevated.  Medication reordered.  Gabapentin reordered discussed importance of maintaining good glucose control  Orders:    gabapentin (NEURONTIN) 300 mg capsule; Take 1 capsule (300 mg total) by mouth 3 (three) times a day    Degenerative disc disease,  lumbar  Has lower back pain.  Discussed wearing comfortable footwear.  Flexeril ordered  Orders:    cyclobenzaprine (FLEXERIL) 5 mg tablet; Take 1 tablet (5 mg total) by mouth daily as needed for muscle spasms    Hypertension due to endocrine disorder  Blood pressure is elevated.  Patient has not been taking medication due to lack of insurance.  Lisinopril reordered.  Maintain heart healthy diet             Preventive Screenings:  - Diabetes Screening: screening not indicated and has diabetes  - Cholesterol Screening: orders placed   - Hepatitis C screening: screening up-to-date   - HIV screening: screening up-to-date   - Colon cancer screening: screening not indicated   - Lung cancer screening: screening not indicated   - Prostate cancer screening: screening not indicated     Immunizations:  - Immunizations due: Prevnar 20, Tdap and Hepatitis A    Counseling/Anticipatory Guidance:  - Alcohol: discussed moderation in alcohol intake and recommendations for healthy alcohol use.   - Drug use: discussed harms of illicit drug use and how it can negatively impact mental/physical health.   - Tobacco use: discussed harms of tobacco use and management options for quitting.   - Dental health: discussed importance of regular tooth brushing, flossing, and dental visits.   - Sexual health: discussed sexually transmitted diseases, partner selection, use of condoms, avoidance of unintended pregnancy, and contraceptive alternatives.   - Diet: discussed recommendations for a healthy/well-balanced diet.   - Exercise: the importance of regular exercise/physical activity was discussed. Recommend exercise 3-5 times per week for at least 30 minutes.   - Injury prevention: discussed safety/seat belts, safety helmets, smoke detectors, carbon monoxide detectors, and smoking near bedding or upholstery.       Depression Screening and Follow-up Plan: Patient was screened for depression during today's encounter. They screened negative with a  "PHQ-2 score of 0.          History of Present Illness     Adult Annual Physical:  Patient presents for annual physical.     Diet and Physical Activity:  - Diet/Nutrition: no special diet.  - Exercise: no formal exercise.    Depression Screening:  - PHQ-2 Score: 0    General Health:  - Sleep: sleeps well.  - Hearing: normal hearing bilateral ears.  - Vision: wears glasses and most recent eye exam < 1 year ago.  - Dental: brushes teeth twice daily.     Health:  - History of STDs: no.   - Urinary symptoms: none.     Advanced Care Planning:  - Has an advanced directive?: no    - Has a durable medical POA?: no    - ACP document given to patient?: no      Review of Systems   Constitutional: Negative.    HENT: Negative.     Eyes: Negative.    Respiratory: Negative.     Cardiovascular: Negative.    Gastrointestinal: Negative.    Endocrine: Negative.    Genitourinary: Negative.    Musculoskeletal:  Positive for arthralgias and back pain.   Skin: Negative.    Allergic/Immunologic: Negative.    Neurological: Negative.    Hematological: Negative.    Psychiatric/Behavioral: Negative.           Objective   /90 (BP Location: Left arm, Patient Position: Sitting, Cuff Size: Extra-Large)   Pulse 97   Temp 98.2 °F (36.8 °C) (Temporal)   Resp 16   Ht 5' 9\" (1.753 m)   Wt 104 kg (228 lb 6.4 oz)   SpO2 97%   BMI 33.73 kg/m²     Physical Exam  Vitals and nursing note reviewed.   Constitutional:       General: He is not in acute distress.     Appearance: He is well-developed. He is obese.   HENT:      Head: Normocephalic and atraumatic.     Cardiovascular:      Rate and Rhythm: Normal rate and regular rhythm.      Pulses: Normal pulses. no weak pulses.           Dorsalis pedis pulses are 2+ on the right side and 2+ on the left side.        Posterior tibial pulses are 2+ on the right side and 2+ on the left side.      Heart sounds: Normal heart sounds. No murmur heard.  Pulmonary:      Effort: Pulmonary effort is normal. No " respiratory distress.      Breath sounds: Normal breath sounds.   Abdominal:      Palpations: Abdomen is soft.      Tenderness: There is no abdominal tenderness.     Musculoskeletal:         General: No swelling.      Cervical back: Normal range of motion and neck supple.   Feet:      Right foot:      Skin integrity: Dry skin present. No ulcer, skin breakdown, erythema, warmth or callus.      Left foot:      Skin integrity: Dry skin present. No ulcer, skin breakdown, erythema, warmth or callus.     Skin:     General: Skin is warm and dry.      Capillary Refill: Capillary refill takes less than 2 seconds.     Neurological:      General: No focal deficit present.      Mental Status: He is alert and oriented to person, place, and time.     Psychiatric:         Mood and Affect: Mood normal.         Thought Content: Thought content normal.         Judgment: Judgment normal.     Diabetic Foot Exam    Patient's shoes and socks removed.    Right Foot/Ankle   Right Foot Inspection  Skin Exam: skin normal, skin intact and dry skin. No warmth, no callus, no erythema, no maceration, no abnormal color, no pre-ulcer, no ulcer and no callus.     Toe Exam: ROM and strength within normal limits.     Sensory   Vibration: diminished  Proprioception: diminished  Monofilament testing: diminished    Vascular  Capillary refills: < 3 seconds  The right DP pulse is 2+. The right PT pulse is 2+.     Right Toe  - Comprehensive Exam  Ecchymosis: none  Arch: normal  Hammertoes: absent  Claw Toes: absent  Swelling: none   Tenderness: none       Left Foot/Ankle  Left Foot Inspection  Skin Exam: skin normal, skin intact and dry skin. No warmth, no erythema, no maceration, normal color, no pre-ulcer, no ulcer and no callus.     Toe Exam: ROM and strength within normal limits.     Sensory   Vibration: diminished  Proprioception: diminished  Monofilament testing: diminished    Vascular  Capillary refills: < 3 seconds  The left DP pulse is 2+. The  left PT pulse is 2+.     Left Toe  - Comprehensive Exam  Ecchymosis: none  Arch: normal  Hammertoes: absent  Claw toes: absent  Swelling: none   Tenderness: none       Assign Risk Category  No deformity present  No loss of protective sensation  No weak pulses  Risk: 1

## 2025-07-02 NOTE — ASSESSMENT & PLAN NOTE
Lab Results   Component Value Date    HGBA1C 9.5 (H) 03/21/2025       Orders:    Dulaglutide 1.5 MG/0.5ML SOAJ; Inject 3 mg under the skin once a week

## 2025-07-02 NOTE — ASSESSMENT & PLAN NOTE
Blood pressure is elevated.  Patient has not been taking medication due to lack of insurance.  Lisinopril reordered.  Maintain heart healthy diet

## 2025-07-02 NOTE — TELEPHONE ENCOUNTER
Patient called and states one medication was sent to [facility] and he would like it to go to [facility] instead.   Informed patient that [facility] but [facility] and it was sent to [facility] with confirmation.  Advised patient to call back with any issues or concerns.

## 2025-07-02 NOTE — ASSESSMENT & PLAN NOTE
Lab Results   Component Value Date    HGBA1C 11.1 (A) 07/02/2025     Patient's hemoglobin A1c is elevated.  Medication reordered.  Gabapentin reordered discussed importance of maintaining good glucose control  Orders:    gabapentin (NEURONTIN) 300 mg capsule; Take 1 capsule (300 mg total) by mouth 3 (three) times a day

## 2025-07-02 NOTE — PATIENT INSTRUCTIONS
"Patient Education     Routine physical for adults   The Basics   Written by the doctors and editors at [facility]   What is a physical? -- A physical is a routine visit, or \"check-up,\" with your doctor. You might also hear it called a \"wellness visit\" or \"preventive visit.\"  During each visit, the doctor will:   Ask about your physical and mental health   Ask about your habits, behaviors, and lifestyle   Do an exam   Give you vaccines if needed   Talk to you about any medicines you take   Give advice about your health   Answer your questions  Getting regular check-ups is an important part of taking care of your health. It can help your doctor find and treat any problems you have. But it's also important for preventing health problems.  A routine physical is different from a \"sick visit.\" A sick visit is when you see a doctor because of a health concern or problem. Since physicals are scheduled ahead of time, you can think about what you want to ask the doctor.  How often should I get a physical? -- It depends on your age and health. In general, for people age 21 years and older:   If you are younger than 50 years, you might be able to get a physical every 3 years.   If you are 50 years or older, your doctor might recommend a physical every year.  If you have an ongoing health condition, like diabetes or high blood pressure, your doctor will probably want to see you more often.  What happens during a physical? -- In general, each visit will include:   Physical exam - The doctor or nurse will check your height, weight, heart rate, and blood pressure. They will also look at your eyes and ears. They will ask about how you are feeling and whether you have any symptoms that bother you.   Medicines - It's a good idea to bring a list of all the medicines you take to each doctor visit. Your doctor will talk to you about your medicines and answer any questions. Tell them if you are having any side effects that bother you. You " "should also tell them if you are having trouble paying for any of your medicines.   Habits and behaviors - This includes:   Your diet   Your exercise habits   Whether you smoke, drink alcohol, or use drugs   Whether you are sexually active   Whether you feel safe at home  Your doctor will talk to you about things you can do to improve your health and lower your risk of health problems. They will also offer help and support. For example, if you want to quit smoking, they can give you advice and might prescribe medicines. If you want to improve your diet or get more physical activity, they can help you with this, too.   Lab tests, if needed - The tests you get will depend on your age and situation. For example, your doctor might want to check your:   Cholesterol   Blood sugar   Iron level   Vaccines - The recommended vaccines will depend on your age, health, and what vaccines you already had. Vaccines are very important because they can prevent certain serious or deadly infections.   Discussion of screening - \"Screening\" means checking for diseases or other health problems before they cause symptoms. Your doctor can recommend screening based on your age, risk, and preferences. This might include tests to check for:   Cancer, such as breast, prostate, cervical, ovarian, colorectal, prostate, lung, or skin cancer   Sexually transmitted infections, such as chlamydia and gonorrhea   Mental health conditions like depression and anxiety  Your doctor will talk to you about the different types of screening tests. They can help you decide which screenings to have. They can also explain what the results might mean.   Answering questions - The physical is a good time to ask the doctor or nurse questions about your health. If needed, they can refer you to other doctors or specialists, too.  Adults older than 65 years often need other care, too. As you get older, your doctor will talk to you about:   How to prevent falling at " home   Hearing or vision tests   Memory testing   How to take your medicines safely   Making sure that you have the help and support you need at home  All topics are updated as new evidence becomes available and our peer review process is complete.  This topic retrieved from [employer] on: May 02, 2024.  Topic 801899 Version 1.0  Release: 32.4.3 - C32.122  © 2024 UpToDate, Inc. and/or its affiliates. All rights reserved.  Consumer Information Use and Disclaimer   Disclaimer: This generalized information is a limited summary of diagnosis, treatment, and/or medication information. It is not meant to be comprehensive and should be used as a tool to help the user understand and/or assess potential diagnostic and treatment options. It does NOT include all information about conditions, treatments, medications, side effects, or risks that may apply to a specific patient. It is not intended to be medical advice or a substitute for the medical advice, diagnosis, or treatment of a health care provider based on the health care provider's examination and assessment of a patient's specific and unique circumstances. Patients must speak with a health care provider for complete information about their health, medical questions, and treatment options, including any risks or benefits regarding use of medications. This information does not endorse any treatments or medications as safe, effective, or approved for treating a specific patient. UpToDate, Inc. and its affiliates disclaim any warranty or liability relating to this information or the use thereof.The use of this information is governed by the Terms of Use, available at https://www.[facility].com/en/know/clinical-effectiveness-terms. 2024© UpToDate, Inc. and its affiliates and/or licensors. All rights reserved.  Copyright   © 2024 UpToDate, Inc. and/or its affiliates. All rights reserved.

## 2025-07-03 ENCOUNTER — TELEPHONE (OUTPATIENT)
Age: 40
End: 2025-07-03

## 2025-07-03 DIAGNOSIS — E11.42 TYPE 2 DIABETES MELLITUS WITH DIABETIC POLYNEUROPATHY, WITH LONG-TERM CURRENT USE OF INSULIN (HCC): ICD-10-CM

## 2025-07-03 DIAGNOSIS — Z79.4 TYPE 2 DIABETES MELLITUS WITH DIABETIC POLYNEUROPATHY, WITH LONG-TERM CURRENT USE OF INSULIN (HCC): ICD-10-CM

## 2025-07-03 NOTE — TELEPHONE ENCOUNTER
New rx sent    
[facility] pharmacy calls stating that Dulaglutide 1.5 MG/0.5ML SOAJ does not match the 3ml dose ordered. They are requesting new electronic order.   
2021 13:05

## 2025-07-08 ENCOUNTER — TELEPHONE (OUTPATIENT)
Age: 40
End: 2025-07-08

## 2025-07-08 NOTE — TELEPHONE ENCOUNTER
PA for Dulaglutide 1.5 MG/0.5ML SOAJ SUBMITTED to     via    []CMM-KEY:   [x]Surescripts-Case ID # [id number]   []Availity-Auth ID # NDC #   []Faxed to plan   []Other website   []Phone call Case ID #     [x]PA sent as URGENT    All office notes, labs and other pertaining documents and studies sent. Clinical questions answered. Awaiting determination from insurance company.     Turnaround time for your insurance to make a decision on your Prior Authorization can take 7-21 business days.

## 2025-07-08 NOTE — TELEPHONE ENCOUNTER
Wife called stating [facility] is requiring a PA for Dulaglutide 1.5 MG/0.5ML SOAJ.  Reason for call:   [x] Prior Auth  [] Other:     Caller:  [x] Patient  [] Pharmacy  Name:   Address:   Callback Number:     Medication: Dulaglutide 1.5 MG/0.5ML SOAJ     Dose/Frequency: 1.5 mg subcutaneous weekly    Quantity: 2 ml    Ordering Provider:   [x] PCP/Provider -  Amaya Sneed  [] Speciality/Provider -     Has the patient tried other medications and failed? If failed, which medications did they fail?    [] No   [] Yes -     Is the patient's insurance updated in EPIC?   [x] Yes   [] No     Is a copy of the patient's insurance scanned in EPIC?   [x] Yes   [] No

## 2025-07-09 NOTE — TELEPHONE ENCOUNTER
PA for Dulaglutide 1.5 MG/0.5ML SOAJ  APPROVED     Date(s) approved June 8, 2025 to July 8, 2026     Case #[id number]       Patient advised by          []Madmagzhart Message  []Phone call   [x]LMOM  []L/M to call office as no active Communication consent on file  []Unable to leave detailed message as VM not approved on Communication consent       Pharmacy advised by    [x]Fax  []Phone call  []Secure Chat       Approval letter scanned into Media No not available at time of determination

## 2025-07-15 DIAGNOSIS — E11.42 TYPE 2 DIABETES MELLITUS WITH DIABETIC POLYNEUROPATHY, WITH LONG-TERM CURRENT USE OF INSULIN (HCC): ICD-10-CM

## 2025-07-15 DIAGNOSIS — Z79.4 TYPE 2 DIABETES MELLITUS WITH DIABETIC POLYNEUROPATHY, WITH LONG-TERM CURRENT USE OF INSULIN (HCC): ICD-10-CM

## 2025-07-22 ENCOUNTER — CONSULT (OUTPATIENT)
Dept: CARDIOLOGY CLINIC | Facility: CLINIC | Age: 40
End: 2025-07-22
Attending: EMERGENCY MEDICINE
Payer: COMMERCIAL

## 2025-07-22 VITALS
SYSTOLIC BLOOD PRESSURE: 144 MMHG | HEART RATE: 96 BPM | OXYGEN SATURATION: 98 % | TEMPERATURE: 98 F | BODY MASS INDEX: 33.8 KG/M2 | WEIGHT: 228.2 LBS | HEIGHT: 69 IN | DIASTOLIC BLOOD PRESSURE: 98 MMHG

## 2025-07-22 DIAGNOSIS — R07.2 PRECORDIAL PAIN: ICD-10-CM

## 2025-07-22 DIAGNOSIS — I10 PRIMARY HYPERTENSION: Primary | ICD-10-CM

## 2025-07-22 DIAGNOSIS — E11.42 DIABETIC POLYNEUROPATHY ASSOCIATED WITH TYPE 2 DIABETES MELLITUS (HCC): ICD-10-CM

## 2025-07-22 PROBLEM — R07.9 LEFT-SIDED CHEST PAIN: Status: ACTIVE | Noted: 2025-07-22

## 2025-07-22 PROCEDURE — 99244 OFF/OP CNSLTJ NEW/EST MOD 40: CPT | Performed by: INTERNAL MEDICINE

## 2025-07-22 PROCEDURE — 93000 ELECTROCARDIOGRAM COMPLETE: CPT | Performed by: INTERNAL MEDICINE

## 2025-07-22 RX ORDER — AMLODIPINE AND VALSARTAN 5; 160 MG/1; MG/1
1 TABLET ORAL DAILY
Qty: 90 TABLET | Refills: 3 | Status: SHIPPED | OUTPATIENT
Start: 2025-07-22

## 2025-07-22 NOTE — LETTER
2025     PARUL Hernandez  [address] [facility] [address]    Patient: Salinas Moralez   YOB: 1985   Date of Visit: 2025       Dear PARUL Calabrese DO:    Thank you for referring Salinas Moralez to me for evaluation. Below are my notes for this consultation.    If you have questions, please do not hesitate to call me. I look forward to following your patient along with you.         Sincerely,        Oumar Quintana MD        CC: DO Oumar Schofield MD  2025  8:39 AM  Sign when Signing Visit  [facility] [address] [address]  Phone#  [phone]  Fax#  [phone]  [facility] Cardiology Office Consultation             NAME: Salinas Moralez  AGE: 40 y.o. SEX: male   : 1985   MRN: [id number]    DATE: 2025  TIME: 8:17 AM    Cardiology Problem list:  Chest pain  Uncontrolled diabetes  Hypertension      Assessment & Plan  Precordial pain  Recent onset of sharp precordial chest pain.  Reports some exertional worsening of the chest pain at times.  Serial cardiac enzymes unremarkable.  EKG performed during chest pain showed sinus rhythm with no ischemic changes.  Cardiovascular risk factors include hypertension and diabetes.  Diabetes historically is uncontrolled.  Lipids also are abnormal with very low HDL and high triglycerides.  LDL is 94.  Recommend further cardiac evaluation with exercise treadmill stress Echo testing and CT chest.  Orders:  •  Ambulatory Referral to Cardiology  •  Echo stress test; Future  •  CT chest wo contrast; Future    Primary hypertension  BP Readings from Last 3 Encounters:   25 144/98   25 150/90   25 147/86   Blood pressure has been historically mildly elevated over the last 1 month on different dates.  Remains compliant to medications.  May benefit from addition of diuretic and increased ACE/ARB dose.  Stop  lisinopril.  Start amlodipine/valsartan 1 tab daily.      Continue current medicines.  Lifestyle modification.  Home blood pressure monitoring advised.    Orders:  •  POCT ECG  •  Echo stress test; Future  •  amLODIPine-valsartan (EXFORGE) 5-160 MG per tablet; Take 1 tablet by mouth daily    Diabetic polyneuropathy associated with type 2 diabetes mellitus (HCC)  Poorly controlled due to lack of access to health care.   Will benefit from statins.  DM control will help TG.  Lab Results   Component Value Date    HGBA1C 11.1 (A) 07/02/2025                   HPI:    Salinas Moralez is a 40 y.o.-year-old male who presents to the cardiology clinic for initial consultation.  He presented to the emergency room on 6/14/2025 with a 2-week history of central and left-sided sharp chest pain without aggravating or relieving factors.  No worsening of pain on exertion.  Pain was felt to be noncardiac at that time.  Serial cardiac enzymes were unremarkable.  EKG showed sinus rhythm.  Today also he reports the same sharp chest pain while in the office.  During the chest pain his EKG shows sinus rhythm at rate of 96 with no ischemic changes.  Cardiac risk factors include underlying longstanding uncontrolled diabetes and hypertension.  He also reports chronic low back pain.  Her blood pressure is slightly elevated today.  He is a non-smoker.  No alcohol use or drug use.      Past history, family history, social history, current medications, vital signs, recent lab and imaging studies and  prior cardiology studies reviewed independently on this visit.    Current Outpatient Medications   Medication Instructions   • Blood Glucose Monitoring Suppl (OneTouch Verio Reflect) w/Device KIT Check blood sugars once daily. Please substitute with appropriate alternative as covered by patient's insurance. Dx: E11.65   • cyclobenzaprine (FLEXERIL) 5 mg, Oral, Daily PRN   • Diclofenac Sodium (VOLTAREN) 2 g, Topical, 4 times daily   • Dulaglutide 1.5  mg, Subcutaneous, Weekly   • gabapentin (NEURONTIN) 300 mg, Oral, 3 times daily   • glucose blood (OneTouch Verio) test strip Check blood sugars once daily. Please substitute with appropriate alternative as covered by patient's insurance. Dx: E11.65   • Insulin Pen Needle (BD Pen Needle Margo U/F) 32G X 4 MM MISC Use daily as directed with insulin pen   • lisinopril (ZESTRIL) 10 mg, Oral, Daily   • metFORMIN (GLUCOPHAGE-XR) 1,000 mg, Oral, Daily with breakfast   • OneTouch Delica Lancets 33G MISC Check blood sugars once daily. Please substitute with appropriate alternative as covered by patient's insurance. Dx: E11.65   • Tresiba FlexTouch 20 Units, Subcutaneous, Daily        Past Medical History[1]  Past Surgical History[2]  Family History[3]  Social History   reports that he has never smoked. He has never used smokeless tobacco. He reports that he does not currently use alcohol. He reports that he does not use drugs.     Review of Systems   Constitutional:  Negative for fatigue.   HENT: Negative.     Eyes: Negative.    Respiratory:  Negative for shortness of breath.    Cardiovascular:  Positive for chest pain. Negative for palpitations and leg swelling.   Gastrointestinal: Negative.    Endocrine: Negative.    Neurological:  Negative for syncope.   Hematological: Negative.    All other systems reviewed and are negative.      Objective:     Vitals:    07/22/25 0811   BP: 144/98   Pulse: 96   Temp: 98 °F (36.7 °C)   SpO2: 98%     Physical Exam  Vitals reviewed.   Constitutional:       General: He is not in acute distress.  HENT:      Head: Normocephalic.     Cardiovascular:      Rate and Rhythm: Normal rate and regular rhythm.      Heart sounds: No murmur heard.  Pulmonary:      Breath sounds: No wheezing or rales.     Musculoskeletal:         General: No swelling.     Skin:     General: Skin is warm.     Neurological:      Mental Status: He is alert. Mental status is at baseline.     Psychiatric:         Mood and  "Affect: Mood normal.         Pertinent Laboratory/Diagnostic Studies:    Laboratory studies reviewed personally by Oumar Quintana MD    BMP:   Lab Results   Component Value Date    SODIUM 136 06/14/2025    K 3.7 06/14/2025     06/14/2025    CO2 25 06/14/2025    BUN 13 06/14/2025    CREATININE 0.76 06/14/2025    GLUC 245 (H) 06/14/2025    CALCIUM 9.1 06/14/2025     CBC:  Lab Results   Component Value Date    WBC 12.21 (H) 06/14/2025    HGB 14.9 06/14/2025    HCT 43.6 06/14/2025    MCV 86 06/14/2025     06/14/2025     Lipid Profile:   Lab Results   Component Value Date    HDL 29 (L) 03/21/2025     Lab Results   Component Value Date    LDLCALC 94 03/21/2025     Lab Results   Component Value Date    TRIG 247 (H) 03/21/2025      Other labs:  Lab Results   Component Value Date    [id number] 1.556 03/21/2025     Lab Results   Component Value Date    ALT 19 06/14/2025    AST 17 06/14/2025     Lab Results   Component Value Date    HGBA1C 11.1 (A) 07/02/2025       Imaging Studies:     Pertinent cardiac studies and imaging studies were personally reviewed on this visit and results summarized.    I have spent a total time of 46  minutes in caring for this patient on the day of the visit/encounter including reviewing and entering of medical history, physical examination, diagnostic results, instructions for management, patient education, risk factor reduction, documenting in the medical record, sending communications to other providers, reviewing/ordering tests, medicine, procedures etc.    Portions of the record may have been created with voice recognition software.  Occasional wrong word or \"sound alike\" substitutions may have occurred due to the inherent limitations of voice recognition software.  Read the chart carefully and recognize, using context, where substitutions have occurred. Please reach out to me directly for any clarifications.         [1]   Past Medical History:  Diagnosis Date   • Arthritis    • " Diabetes mellitus (HCC)    • Hypertension    [2]   Past Surgical History:  Procedure Laterality Date   • BACK SURGERY     [3]   Family History  Family history unknown: Yes     RYAN_HUBER

## 2025-07-22 NOTE — ASSESSMENT & PLAN NOTE
BP Readings from Last 3 Encounters:   07/22/25 144/98   07/02/25 150/90   06/14/25 147/86   Blood pressure has been historically mildly elevated over the last 1 month on different dates.  Remains compliant to medications.  May benefit from addition of diuretic and increased ACE/ARB dose.  Stop lisinopril.  Start amlodipine/valsartan 1 tab daily.      Continue current medicines.  Lifestyle modification.  Home blood pressure monitoring advised.    Orders:    POCT ECG    Echo stress test; Future    amLODIPine-valsartan (EXFORGE) 5-160 MG per tablet; Take 1 tablet by mouth daily

## 2025-07-22 NOTE — PROGRESS NOTES
[facility] [place]  [address] [facility] [address]  Phone#  [phone]  Fax#  [phone]  [facility] Cardiology Office Consultation             NAME: Salinas Moralez  AGE: 40 y.o. SEX: male   : 1985   MRN: [id number]    DATE: 2025  TIME: 8:17 AM    Cardiology Problem list:  Chest pain  Uncontrolled diabetes  Hypertension      Assessment & Plan  Precordial pain  Recent onset of sharp precordial chest pain.  Reports some exertional worsening of the chest pain at times.  Serial cardiac enzymes unremarkable.  EKG performed during chest pain showed sinus rhythm with no ischemic changes.  Cardiovascular risk factors include hypertension and diabetes.  Diabetes historically is uncontrolled.  Lipids also are abnormal with very low HDL and high triglycerides.  LDL is 94.  Recommend further cardiac evaluation with exercise treadmill stress Echo testing and CT chest.  Orders:    Ambulatory Referral to Cardiology    Echo stress test; Future    CT chest wo contrast; Future    Primary hypertension  BP Readings from Last 3 Encounters:   25 144/98   25 150/90   25 147/86   Blood pressure has been historically mildly elevated over the last 1 month on different dates.  Remains compliant to medications.  May benefit from addition of diuretic and increased ACE/ARB dose.  Stop lisinopril.  Start amlodipine/valsartan 1 tab daily.      Continue current medicines.  Lifestyle modification.  Home blood pressure monitoring advised.    Orders:    POCT ECG    Echo stress test; Future    amLODIPine-valsartan (EXFORGE) 5-160 MG per tablet; Take 1 tablet by mouth daily    Diabetic polyneuropathy associated with type 2 diabetes mellitus (HCC)  Poorly controlled due to lack of access to health care.   Will benefit from statins.  DM control will help TG.  Lab Results   Component Value Date    HGBA1C 11.1 (A) 2025                   HPI:    Salinas Moralez is a 40 y.o.-year-old male who  presents to the cardiology clinic for initial consultation.  He presented to the emergency room on 6/14/2025 with a 2-week history of central and left-sided sharp chest pain without aggravating or relieving factors.  No worsening of pain on exertion.  Pain was felt to be noncardiac at that time.  Serial cardiac enzymes were unremarkable.  EKG showed sinus rhythm.  Today also he reports the same sharp chest pain while in the office.  During the chest pain his EKG shows sinus rhythm at rate of 96 with no ischemic changes.  Cardiac risk factors include underlying longstanding uncontrolled diabetes and hypertension.  He also reports chronic low back pain.  Her blood pressure is slightly elevated today.  He is a non-smoker.  No alcohol use or drug use.      Past history, family history, social history, current medications, vital signs, recent lab and imaging studies and  prior cardiology studies reviewed independently on this visit.    Current Outpatient Medications   Medication Instructions    Blood Glucose Monitoring Suppl (OneTouch Verio Reflect) w/Device KIT Check blood sugars once daily. Please substitute with appropriate alternative as covered by patient's insurance. Dx: E11.65    cyclobenzaprine (FLEXERIL) 5 mg, Oral, Daily PRN    Diclofenac Sodium (VOLTAREN) 2 g, Topical, 4 times daily    Dulaglutide 1.5 mg, Subcutaneous, Weekly    gabapentin (NEURONTIN) 300 mg, Oral, 3 times daily    glucose blood (OneTouch Verio) test strip Check blood sugars once daily. Please substitute with appropriate alternative as covered by patient's insurance. Dx: E11.65    Insulin Pen Needle (BD Pen Needle Margo U/F) 32G X 4 MM MISC Use daily as directed with insulin pen    lisinopril (ZESTRIL) 10 mg, Oral, Daily    metFORMIN (GLUCOPHAGE-XR) 1,000 mg, Oral, Daily with breakfast    OneTouch Delica Lancets 33G MISC Check blood sugars once daily. Please substitute with appropriate alternative as covered by patient's insurance. Dx: E11.65     Tresiba FlexTouch 20 Units, Subcutaneous, Daily        Past Medical History[1]  Past Surgical History[2]  Family History[3]  Social History   reports that he has never smoked. He has never used smokeless tobacco. He reports that he does not currently use alcohol. He reports that he does not use drugs.     Review of Systems   Constitutional:  Negative for fatigue.   HENT: Negative.     Eyes: Negative.    Respiratory:  Negative for shortness of breath.    Cardiovascular:  Positive for chest pain. Negative for palpitations and leg swelling.   Gastrointestinal: Negative.    Endocrine: Negative.    Neurological:  Negative for syncope.   Hematological: Negative.    All other systems reviewed and are negative.      Objective:     Vitals:    07/22/25 0811   BP: 144/98   Pulse: 96   Temp: 98 °F (36.7 °C)   SpO2: 98%     Physical Exam  Vitals reviewed.   Constitutional:       General: He is not in acute distress.  HENT:      Head: Normocephalic.     Cardiovascular:      Rate and Rhythm: Normal rate and regular rhythm.      Heart sounds: No murmur heard.  Pulmonary:      Breath sounds: No wheezing or rales.     Musculoskeletal:         General: No swelling.     Skin:     General: Skin is warm.     Neurological:      Mental Status: He is alert. Mental status is at baseline.     Psychiatric:         Mood and Affect: Mood normal.         Pertinent Laboratory/Diagnostic Studies:    Laboratory studies reviewed personally by Oumar Quintana MD    BMP:   Lab Results   Component Value Date    SODIUM 136 06/14/2025    K 3.7 06/14/2025     06/14/2025    CO2 25 06/14/2025    BUN 13 06/14/2025    CREATININE 0.76 06/14/2025    GLUC 245 (H) 06/14/2025    CALCIUM 9.1 06/14/2025     CBC:  Lab Results   Component Value Date    WBC 12.21 (H) 06/14/2025    HGB 14.9 06/14/2025    HCT 43.6 06/14/2025    MCV 86 06/14/2025     06/14/2025     Lipid Profile:   Lab Results   Component Value Date    HDL 29 (L) 03/21/2025     Lab Results  "  Component Value Date    LDLCALC 94 03/21/2025     Lab Results   Component Value Date    TRIG 247 (H) 03/21/2025      Other labs:  Lab Results   Component Value Date    [id number] 1.556 03/21/2025     Lab Results   Component Value Date    ALT 19 06/14/2025    AST 17 06/14/2025     Lab Results   Component Value Date    HGBA1C 11.1 (A) 07/02/2025       Imaging Studies:     Pertinent cardiac studies and imaging studies were personally reviewed on this visit and results summarized.    I have spent a total time of 46  minutes in caring for this patient on the day of the visit/encounter including reviewing and entering of medical history, physical examination, diagnostic results, instructions for management, patient education, risk factor reduction, documenting in the medical record, sending communications to other providers, reviewing/ordering tests, medicine, procedures etc.    Portions of the record may have been created with voice recognition software.  Occasional wrong word or \"sound alike\" substitutions may have occurred due to the inherent limitations of voice recognition software.  Read the chart carefully and recognize, using context, where substitutions have occurred. Please reach out to me directly for any clarifications.         [1]   Past Medical History:  Diagnosis Date    Arthritis     Diabetes mellitus (HCC)     Hypertension    [2]   Past Surgical History:  Procedure Laterality Date    BACK SURGERY     [3]   Family History  Family history unknown: Yes     "

## 2025-07-22 NOTE — ASSESSMENT & PLAN NOTE
Poorly controlled due to lack of access to health care.   Will benefit from statins.  DM control will help TG.  Lab Results   Component Value Date    HGBA1C 11.1 (A) 07/02/2025

## 2025-07-22 NOTE — ASSESSMENT & PLAN NOTE
Recent onset of sharp precordial chest pain.  Reports some exertional worsening of the chest pain at times.  Serial cardiac enzymes unremarkable.  EKG performed during chest pain showed sinus rhythm with no ischemic changes.  Cardiovascular risk factors include hypertension and diabetes.  Diabetes historically is uncontrolled.  Lipids also are abnormal with very low HDL and high triglycerides.  LDL is 94.  Recommend further cardiac evaluation with exercise treadmill stress Echo testing and CT chest.  Orders:    Ambulatory Referral to Cardiology    Echo stress test; Future    CT chest wo contrast; Future

## 2025-07-22 NOTE — PATIENT INSTRUCTIONS
Stress testing planned for further cardiac risk assessment.  CT chest advised.   Stop lisinopril.  Start amlodipine/valsartan 1 tab daily.    Please call central scheduling at [phone] to schedule your test.  Please contact our office if you have any difficulty in scheduling your tests.